# Patient Record
Sex: FEMALE | Race: WHITE | NOT HISPANIC OR LATINO | URBAN - METROPOLITAN AREA
[De-identification: names, ages, dates, MRNs, and addresses within clinical notes are randomized per-mention and may not be internally consistent; named-entity substitution may affect disease eponyms.]

---

## 2018-04-25 PROBLEM — Z23 NEED FOR HEPATITIS B VACCINATION: Status: ACTIVE | Noted: 2018-04-25

## 2018-04-25 PROBLEM — E55.9 VITAMIN D DEFICIENCY: Status: ACTIVE | Noted: 2018-04-25

## 2018-04-26 PROBLEM — R89.4 HERPES SIMPLEX ANTIBODY POSITIVE: Status: RESOLVED | Noted: 2018-04-26 | Resolved: 2018-04-26

## 2019-08-29 PROBLEM — Z01.89 DIAGNOSTIC SKIN TEST: Status: ACTIVE | Noted: 2019-08-29

## 2019-08-29 PROBLEM — T50.905A ADVERSE DRUG REACTION, INITIAL ENCOUNTER: Status: ACTIVE | Noted: 2019-08-29

## 2019-08-29 PROBLEM — L50.2 URTICARIA DUE TO COLD: Status: ACTIVE | Noted: 2019-08-29

## 2021-04-05 PROBLEM — Z12.4 CERVICAL CANCER SCREENING: Status: RESOLVED | Noted: 2020-10-08 | Resolved: 2021-04-05

## 2022-02-02 PROBLEM — R12 HEARTBURN: Status: ACTIVE | Noted: 2019-04-18

## 2022-07-15 PROBLEM — Z12.39 BREAST CANCER SCREENING: Status: ACTIVE | Noted: 2020-10-08

## 2022-07-15 PROBLEM — Z11.3 SCREEN FOR STD (SEXUALLY TRANSMITTED DISEASE): Status: ACTIVE | Noted: 2020-10-08

## 2022-07-22 PROBLEM — A59.9 TRICHOMONAS INFECTION: Status: ACTIVE | Noted: 2022-07-22

## 2022-09-09 PROBLEM — L29.2 VULVAR ITCHING: Status: ACTIVE | Noted: 2022-09-09

## 2022-10-04 PROBLEM — A59.01 TRICHOMONIASIS OF VAGINA: Status: ACTIVE | Noted: 2022-10-04

## 2022-10-12 PROBLEM — K80.20 CHOLELITHIASIS WITHOUT CHOLECYSTITIS: Status: ACTIVE | Noted: 2022-10-12

## 2022-11-03 PROBLEM — Z87.19 PERSONAL HISTORY OF OTHER DISEASES OF THE DIGESTIVE SYSTEM: Chronic | Status: ACTIVE | Noted: 2022-11-01

## 2022-11-10 PROBLEM — R73.03 PREDIABETES: Status: ACTIVE | Noted: 2021-04-19

## 2023-01-31 PROBLEM — N93.8 DYSFUNCTIONAL UTERINE BLEEDING: Status: ACTIVE | Noted: 2023-01-31

## 2023-02-06 PROBLEM — L53.9 ERYTHEMA: Status: ACTIVE | Noted: 2023-02-06

## 2023-03-10 PROBLEM — D50.9 MICROCYTIC ANEMIA: Status: ACTIVE | Noted: 2019-04-18

## 2023-04-05 PROBLEM — N76.1 SUBACUTE VAGINITIS: Status: ACTIVE | Noted: 2023-04-05

## 2023-11-08 PROBLEM — N20.1 OBSTRUCTION OF LEFT URETEROPELVIC JUNCTION DUE TO STONE: Status: ACTIVE | Noted: 2023-11-08

## 2023-12-31 PROBLEM — Z20.2 POSSIBLE EXPOSURE TO STD: Status: ACTIVE | Noted: 2018-03-28

## 2024-01-19 PROBLEM — N76.0 ACUTE VAGINITIS: Status: ACTIVE | Noted: 2024-01-19 | Resolved: 2024-02-18

## 2024-02-12 PROBLEM — E44.1 MILD PROTEIN-CALORIE MALNUTRITION: Status: ACTIVE | Noted: 2023-01-30

## 2024-06-20 PROBLEM — E65 ABDOMINAL PANNUS: Status: ACTIVE | Noted: 2024-06-20

## 2024-06-23 PROBLEM — R63.4 WEIGHT LOSS: Status: ACTIVE | Noted: 2024-06-23

## 2024-06-23 PROBLEM — M79.3 PANNICULITIS: Status: ACTIVE | Noted: 2024-06-23

## 2024-06-24 ENCOUNTER — OUTPATIENT (OUTPATIENT)
Dept: OUTPATIENT SERVICES | Facility: HOSPITAL | Age: 49
LOS: 1 days | End: 2024-06-24
Payer: MEDICARE

## 2024-06-24 DIAGNOSIS — Z98.890 OTHER SPECIFIED POSTPROCEDURAL STATES: Chronic | ICD-10-CM

## 2024-06-24 DIAGNOSIS — E65 LOCALIZED ADIPOSITY: ICD-10-CM

## 2024-06-24 PROBLEM — R63.5 WEIGHT GAIN: Status: ACTIVE | Noted: 2024-06-24

## 2024-06-24 PROCEDURE — 74176 CT ABD & PELVIS W/O CONTRAST: CPT

## 2024-07-10 PROBLEM — K42.9 UMBILICAL HERNIA WITHOUT OBSTRUCTION AND WITHOUT GANGRENE: Status: ACTIVE | Noted: 2024-07-10

## 2024-07-10 PROBLEM — K43.9 EPIGASTRIC HERNIA: Status: ACTIVE | Noted: 2024-07-10

## 2024-09-13 ENCOUNTER — EMERGENCY (EMERGENCY)
Facility: HOSPITAL | Age: 49
LOS: 1 days | Discharge: LEFT BEFORE DISCHARGE | End: 2024-09-13
Admitting: STUDENT IN AN ORGANIZED HEALTH CARE EDUCATION/TRAINING PROGRAM
Payer: MEDICARE

## 2024-09-13 VITALS
RESPIRATION RATE: 18 BRPM | SYSTOLIC BLOOD PRESSURE: 139 MMHG | TEMPERATURE: 98 F | OXYGEN SATURATION: 98 % | DIASTOLIC BLOOD PRESSURE: 79 MMHG | HEART RATE: 79 BPM | HEIGHT: 65 IN | WEIGHT: 293 LBS

## 2024-09-13 VITALS
RESPIRATION RATE: 18 BRPM | HEART RATE: 75 BPM | SYSTOLIC BLOOD PRESSURE: 146 MMHG | TEMPERATURE: 98 F | DIASTOLIC BLOOD PRESSURE: 93 MMHG | OXYGEN SATURATION: 97 %

## 2024-09-13 DIAGNOSIS — Z98.890 OTHER SPECIFIED POSTPROCEDURAL STATES: Chronic | ICD-10-CM

## 2024-09-13 LAB — HCG UR QL: NEGATIVE — SIGNIFICANT CHANGE UP

## 2024-09-13 PROCEDURE — 73562 X-RAY EXAM OF KNEE 3: CPT | Mod: 26,LT

## 2024-09-13 PROCEDURE — 99284 EMERGENCY DEPT VISIT MOD MDM: CPT

## 2024-09-13 RX ORDER — ACETAMINOPHEN 325 MG/1
650 TABLET ORAL ONCE
Refills: 0 | Status: COMPLETED | OUTPATIENT
Start: 2024-09-13 | End: 2024-09-13

## 2024-09-13 RX ADMIN — ACETAMINOPHEN 650 MILLIGRAM(S): 325 TABLET ORAL at 10:14

## 2024-09-13 NOTE — ED PROVIDER NOTE - ENMT, MLM
Airway patent, Nasal mucosa clear. Mouth with normal mucosa. Throat has no vesicles, no oropharyngeal exudates and uvula is midline. see skin pe

## 2024-09-13 NOTE — ED PROVIDER NOTE - CLINICAL SUMMARY MEDICAL DECISION MAKING FREE TEXT BOX
49 y/o female w/ a pmh of htn, h.pylori, carlo, fatty liver and iron deficiency anemia reports today c/o head trauma and L-knee pain X  approximately1 hr ago. She states she was walking- accidentally tripped on uneven sidewalk and hit her head. Tetanus UTD. 49 y/o female w/ a pmh of htn, h.pylori, carlo, fatty liver and iron deficiency anemia reports today c/o head trauma and L-knee pain X  approximately1 hr ago. She states she was walking- accidentally tripped on uneven sidewalk and hit her head. Tetanus UTD.  pt stable, in nad and non-toxic appearing  plan for pt- ct head , L-knee xr, apap  pt resting comfortably- will reassess.

## 2024-09-13 NOTE — ED PROVIDER NOTE - PROGRESS NOTE DETAILS
L-knee xr- no fx  pt did not want to wait for ct head- states she has to go   Risk of leaving w/o getting the CT discussed w/ pt= pt understood  She was recommended to f/u w/ her pcp - apap recommended  She was able to ambulate on her own out of the ED w/o assistance. L-knee xr- no fx  pt did not want to wait for ct head- states she has to go   Risk of leaving w/o getting the CT discussed w/ pt which included but was not limited to death= pt understood  She was recommended to f/u w/ her pcp - apap recommended  She was able to ambulate on her own out of the ED w/o assistance.

## 2024-09-13 NOTE — ED PROVIDER NOTE - PATIENT PORTAL LINK FT
You can access the FollowMyHealth Patient Portal offered by [facility] by registering at the following website: [email or web address]. By joining [facility]’s FollowMyHealth portal, you will also be able to view your health information using other applications (apps) compatible with our system.

## 2024-09-13 NOTE — ED PROVIDER NOTE - OBJECTIVE STATEMENT
49 y/o female w/ a pmh of htn, h.pylori, carlo, fatty liver and iron deficiency anemia reports today c/o head trauma and L-knee pain X  approximately1 hr ago. She states she was walking- accidentally tripped on uneven sidewalk and hit her head. Tetanus UTD. Denies; cp, sob, abdominal pain,dizziness, pre-syncopal episode, fever, chills, nausea or vomiting. 47 y/o female w/ a pmh of htn, h.pylori, carlo, fatty liver and iron deficiency anemia reports today c/o head trauma and L-knee pain X  approximately1 hr ago. She states she was walking- accidentally tripped on uneven sidewalk and hit her head. Tetanus UTD. Denies; cp,loc,  sob, abdominal pain,dizziness, pre-syncopal episode, fever, chills, nausea or vomiting.

## 2024-09-13 NOTE — ED ADULT NURSE NOTE - OBJECTIVE STATEMENT
Pt received to fast track. pt is AxO 3 and ambulatory. pt c/o left knee pain with head abrasion from head strike after tripping on an uneven side walk. pt denies LOC. -BEFAST. No active bleeding from left forehead abrasion. Pt respirations even and unlabored. Pt denies dizziness, blurred vision, n/v/d, abdominal pain, SOB, chest pain, or fever like symptoms. Awaiting MD orders. plan of care ongoing. Pt received to fast track. pt is AxO 3 and ambulatory. pt c/o left knee pain with head abrasion from head strike to the left forehead with hematoma after tripping on an uneven side walk. pt denies LOC. -BEFAST. No active bleeding from left forehead abrasion. Pt respirations even and unlabored. Pt denies dizziness, blurred vision, n/v/d, abdominal pain, SOB, chest pain, or fever like symptoms. Awaiting MD orders. plan of care ongoing.

## 2024-09-13 NOTE — ED ADULT NURSE NOTE - IN THE PAST 12 MONTHS HAVE YOU USED DRUGS OTHER THAN THOSE REQUIRED FOR MEDICAL REASON?
Finger stick 195 mg/dl, diabetic teaching done. Family verbalized understanding/teaching back demonstrated. Continuous monitoring.
Hernandez catheter changed per Dr. Ji. Hernandez catheter 16 FR placed, UA/C&S sent, 800 cc output, urine yellow.
No

## 2024-09-13 NOTE — ED PROVIDER NOTE - WR ORDER DATE AND TIME 1
Quality 137: Melanoma: Continuity Of Care - Recall System: Patient information entered into a recall system that includes: target date for the next exam specified AND a process to follow up with patients regarding missed or unscheduled appointments Quality 138: Melanoma: Coordination Of Care: A treatment plan was communicated to the physicians providing continuing care within one month of diagnosis outlining: diagnosis, tumor thickness and a plan for surgery or alternate care. Quality 110: Preventive Care And Screening: Influenza Immunization: Influenza Immunization Administered during Influenza season Detail Level: Simple 13-Sep-2024 09:58

## 2024-09-13 NOTE — ED ADULT TRIAGE NOTE - CHIEF COMPLAINT QUOTE
c/o left knee and headache after trip and fall today while walking up a star, denies LOC, denies N/V change sin vision, denies Phx, small abrasion noted to the left side of the forehead.

## 2024-09-13 NOTE — ED PROVIDER NOTE - CCCP TRG CHIEF CMPLNT
fall, knee pain/head injury Is This A New Presentation, Or A Follow-Up?: Skin Lesions What Type Of Note Output Would You Prefer (Optional)?: Bullet Format How Severe Is Your Skin Lesion?: moderate Has Your Skin Lesion Been Treated?: not been treated Additional History: Pt reports “milia” that she extracts at home. She states that she believes she has caused wrinkles and scarring due to picking at them. Would like to discuss treatment options.

## 2024-09-13 NOTE — ED PROVIDER NOTE - CARE PLAN
Principal Discharge DX:	Head trauma   1 Principal Discharge DX:	Head trauma  Secondary Diagnosis:	Left knee pain

## 2024-09-13 NOTE — ED ADULT NURSE NOTE - NSFALLUNIVINTERV_ED_ALL_ED
Bed/Stretcher in lowest position, wheels locked, appropriate side rails in place/Call bell, personal items and telephone in reach/Instruct patient to call for assistance before getting out of bed/chair/stretcher/Non-slip footwear applied when patient is off stretcher/[place] to call system/Physically safe environment - no spills, clutter or unnecessary equipment/Purposeful proactive rounding/Room/bathroom lighting operational, light cord in reach

## 2024-09-13 NOTE — ED ADULT TRIAGE NOTE - TEMP AT ED ARRIVAL (C)
36.5 Bcc  Nodulocystic Histology Text: The dermis contains distinctive tumor cell masses of various shapes and sizes composed of cells with large oval or elongated nuclei with relatively little cytoplasm.  The nuclei are homogenous.  There is no pronounced variation in size or intensity of staining and no significant anaplastic appearance.  The cells at the outer aspect of the tumor masses show palisading of nuclei.  Tumor masses are surrounded by a connective tissue stroma and in some areas there is retraction artifact of the tumor nodule away from the surrounding stroma.  A nodulocystic histology is noted.

## 2024-10-08 ENCOUNTER — APPOINTMENT (OUTPATIENT)
Dept: GASTROENTEROLOGY | Facility: HOSPITAL | Age: 49
End: 2024-10-08

## 2024-10-17 ENCOUNTER — OUTPATIENT (OUTPATIENT)
Dept: OUTPATIENT SERVICES | Facility: HOSPITAL | Age: 49
LOS: 1 days | End: 2024-10-17
Payer: MEDICARE

## 2024-10-17 VITALS
OXYGEN SATURATION: 97 % | HEART RATE: 80 BPM | DIASTOLIC BLOOD PRESSURE: 82 MMHG | RESPIRATION RATE: 18 BRPM | TEMPERATURE: 98 F | SYSTOLIC BLOOD PRESSURE: 133 MMHG | HEIGHT: 65 IN | WEIGHT: 293 LBS

## 2024-10-17 DIAGNOSIS — Z98.890 OTHER SPECIFIED POSTPROCEDURAL STATES: Chronic | ICD-10-CM

## 2024-10-17 DIAGNOSIS — M79.3 PANNICULITIS, UNSPECIFIED: ICD-10-CM

## 2024-10-17 DIAGNOSIS — K43.9 VENTRAL HERNIA WITHOUT OBSTRUCTION OR GANGRENE: ICD-10-CM

## 2024-10-17 DIAGNOSIS — R63.4 ABNORMAL WEIGHT LOSS: ICD-10-CM

## 2024-10-17 DIAGNOSIS — E65 LOCALIZED ADIPOSITY: ICD-10-CM

## 2024-10-17 DIAGNOSIS — K42.9 UMBILICAL HERNIA WITHOUT OBSTRUCTION OR GANGRENE: ICD-10-CM

## 2024-10-17 DIAGNOSIS — Z90.3 ACQUIRED ABSENCE OF STOMACH [PART OF]: Chronic | ICD-10-CM

## 2024-10-17 LAB
ANION GAP SERPL CALC-SCNC: 10 MMOL/L — SIGNIFICANT CHANGE UP (ref 5–17)
BLD GP AB SCN SERPL QL: SIGNIFICANT CHANGE UP
BUN SERPL-MCNC: 10 MG/DL — SIGNIFICANT CHANGE UP (ref 7–23)
CALCIUM SERPL-MCNC: 9.4 MG/DL — SIGNIFICANT CHANGE UP (ref 8.4–10.5)
CHLORIDE SERPL-SCNC: 103 MMOL/L — SIGNIFICANT CHANGE UP (ref 96–108)
CO2 SERPL-SCNC: 28 MMOL/L — SIGNIFICANT CHANGE UP (ref 22–31)
CREAT SERPL-MCNC: 0.69 MG/DL — SIGNIFICANT CHANGE UP (ref 0.5–1.3)
EGFR: 108 ML/MIN/1.73M2 — SIGNIFICANT CHANGE UP
GLUCOSE SERPL-MCNC: 122 MG/DL — HIGH (ref 70–99)
HCT VFR BLD CALC: 38.8 % — SIGNIFICANT CHANGE UP (ref 34.5–45)
HGB BLD-MCNC: 12.8 G/DL — SIGNIFICANT CHANGE UP (ref 11.5–15.5)
MCHC RBC-ENTMCNC: 28.5 PG — SIGNIFICANT CHANGE UP (ref 27–34)
MCHC RBC-ENTMCNC: 33 GM/DL — SIGNIFICANT CHANGE UP (ref 32–36)
MCV RBC AUTO: 86.4 FL — SIGNIFICANT CHANGE UP (ref 80–100)
NRBC # BLD: 0 /100 WBCS — SIGNIFICANT CHANGE UP (ref 0–0)
PLATELET # BLD AUTO: 324 K/UL — SIGNIFICANT CHANGE UP (ref 150–400)
POTASSIUM SERPL-MCNC: 3.8 MMOL/L — SIGNIFICANT CHANGE UP (ref 3.5–5.3)
POTASSIUM SERPL-SCNC: 3.8 MMOL/L — SIGNIFICANT CHANGE UP (ref 3.5–5.3)
RBC # BLD: 4.49 M/UL — SIGNIFICANT CHANGE UP (ref 3.8–5.2)
RBC # FLD: 12.7 % — SIGNIFICANT CHANGE UP (ref 10.3–14.5)
SODIUM SERPL-SCNC: 141 MMOL/L — SIGNIFICANT CHANGE UP (ref 135–145)
WBC # BLD: 12.92 K/UL — HIGH (ref 3.8–10.5)
WBC # FLD AUTO: 12.92 K/UL — HIGH (ref 3.8–10.5)

## 2024-10-17 PROCEDURE — 86901 BLOOD TYPING SEROLOGIC RH(D): CPT

## 2024-10-17 PROCEDURE — 93010 ELECTROCARDIOGRAM REPORT: CPT | Mod: NC

## 2024-10-17 PROCEDURE — 85027 COMPLETE CBC AUTOMATED: CPT

## 2024-10-17 PROCEDURE — 86900 BLOOD TYPING SEROLOGIC ABO: CPT

## 2024-10-17 PROCEDURE — G0463: CPT

## 2024-10-17 PROCEDURE — 36415 COLL VENOUS BLD VENIPUNCTURE: CPT

## 2024-10-17 PROCEDURE — 80048 BASIC METABOLIC PNL TOTAL CA: CPT

## 2024-10-17 PROCEDURE — 86850 RBC ANTIBODY SCREEN: CPT

## 2024-10-17 PROCEDURE — 93005 ELECTROCARDIOGRAM TRACING: CPT

## 2024-10-17 NOTE — H&P PST ADULT - NSICDXPASTMEDICALHX_GEN_ALL_CORE_FT
PAST MEDICAL HISTORY:  Panniculitis     Severe obesity (BMI >= 40)     Umbilical hernia     Ventral hernia

## 2024-10-17 NOTE — H&P PST ADULT - NSANTHOSAYNRD_GEN_A_CORE
neck 16 inches/No. NICOLÁS screening performed.  STOP BANG Legend: 0-2 = LOW Risk; 3-4 = INTERMEDIATE Risk; 5-8 = HIGH Risk

## 2024-10-17 NOTE — H&P PST ADULT - PROBLEM SELECTOR PLAN 1
Patient provided with pre-operative instructions and verbalized understanding.  Patient will be NPO on day of surgery. Patient will stop NSAIDs, aspirin, herbal supplements or vitamins 1 week prior to surgery.  Chlorhexidine wash provided with instructions, verbalized understanding.      Pending medical clearance as per surgeon.

## 2024-10-17 NOTE — H&P PST ADULT - TOBACCO USE
Mental Health/AODAcommunity resources discussed and given to patient.   Discussed with patient after care plan and appointments completed and documented in the AVS.   KARINA's signed and obtained for outpatient  providers.        Never smoker

## 2024-10-17 NOTE — H&P PST ADULT - HISTORY OF PRESENT ILLNESS
rashes and skin removal  Patient is a 48 year old F with no pertinent medical history presents for perioperative testing for Abdominal panniculectomy with repair of ventral and umbilical hernia with Dr. Susan Mcintyre and Dr. Lauren Shikowitz-Behr scheduled for 10/28/2024. Reports having occasional skin rashes in skin fold d/t excess skin therefore having upcoming surgery. Denies  abdominal pain, N/V/D/C, change in urinary/bowel patterns, or any acute symptoms at this time. Patient otherwise feels well overall.

## 2024-10-18 ENCOUNTER — APPOINTMENT (OUTPATIENT)
Dept: SURGERY | Facility: CLINIC | Age: 49
End: 2024-10-18

## 2024-10-21 RX ORDER — CEFADROXIL 500 MG/1
500 CAPSULE ORAL TWICE DAILY
Qty: 14 | Refills: 0 | Status: ACTIVE | COMMUNITY
Start: 2024-10-21 | End: 1900-01-01

## 2024-10-21 RX ORDER — ONDANSETRON 4 MG/1
4 TABLET, ORALLY DISINTEGRATING ORAL
Qty: 21 | Refills: 0 | Status: ACTIVE | COMMUNITY
Start: 2024-10-21 | End: 1900-01-01

## 2024-10-22 RX ORDER — OXYCODONE 5 MG/1
5 TABLET ORAL
Qty: 12 | Refills: 0 | Status: ACTIVE | COMMUNITY
Start: 2024-10-22 | End: 1900-01-01

## 2024-10-25 DIAGNOSIS — Z98.84 BARIATRIC SURGERY STATUS: ICD-10-CM

## 2024-10-28 ENCOUNTER — TRANSCRIPTION ENCOUNTER (OUTPATIENT)
Age: 49
End: 2024-10-28

## 2024-10-28 ENCOUNTER — APPOINTMENT (OUTPATIENT)
Dept: SURGERY | Facility: HOSPITAL | Age: 49
End: 2024-10-28

## 2024-10-28 ENCOUNTER — APPOINTMENT (OUTPATIENT)
Dept: PLASTIC SURGERY | Facility: HOSPITAL | Age: 49
End: 2024-10-28

## 2024-10-28 ENCOUNTER — OUTPATIENT (OUTPATIENT)
Dept: OUTPATIENT SERVICES | Facility: HOSPITAL | Age: 49
LOS: 1 days | End: 2024-10-28
Payer: MEDICARE

## 2024-10-28 DIAGNOSIS — Z90.3 ACQUIRED ABSENCE OF STOMACH [PART OF]: Chronic | ICD-10-CM

## 2024-10-28 DIAGNOSIS — Z98.890 OTHER SPECIFIED POSTPROCEDURAL STATES: Chronic | ICD-10-CM

## 2024-10-28 PROCEDURE — 15830 EXC EXCESSIVE SKIN ABDOMEN: CPT

## 2024-10-28 DEVICE — ARISTA 3GR: Type: IMPLANTABLE DEVICE | Status: FUNCTIONAL

## 2024-10-28 DEVICE — CLIP APPLIER ETHICON LIGACLIP 11.5" MEDIUM: Type: IMPLANTABLE DEVICE | Status: FUNCTIONAL

## 2024-10-30 ENCOUNTER — TRANSCRIPTION ENCOUNTER (OUTPATIENT)
Age: 49
End: 2024-10-30

## 2024-10-30 ENCOUNTER — RESULT REVIEW (OUTPATIENT)
Age: 49
End: 2024-10-30

## 2024-10-31 DIAGNOSIS — K43.9 VENTRAL HERNIA WITHOUT OBSTRUCTION OR GANGRENE: ICD-10-CM

## 2024-10-31 DIAGNOSIS — Z98.84 BARIATRIC SURGERY STATUS: ICD-10-CM

## 2024-10-31 PROBLEM — E66.01 MORBID (SEVERE) OBESITY DUE TO EXCESS CALORIES: Chronic | Status: ACTIVE | Noted: 2024-10-17

## 2024-10-31 PROCEDURE — 99231 SBSQ HOSP IP/OBS SF/LOW 25: CPT

## 2024-11-02 PROCEDURE — 49594 RPR AA HRN 1ST 3-10 NCR/STRN: CPT

## 2024-11-02 PROCEDURE — 87040 BLOOD CULTURE FOR BACTERIA: CPT

## 2024-11-02 PROCEDURE — 83880 ASSAY OF NATRIURETIC PEPTIDE: CPT

## 2024-11-02 PROCEDURE — 71045 X-RAY EXAM CHEST 1 VIEW: CPT

## 2024-11-02 PROCEDURE — 93005 ELECTROCARDIOGRAM TRACING: CPT

## 2024-11-02 PROCEDURE — 87086 URINE CULTURE/COLONY COUNT: CPT

## 2024-11-02 PROCEDURE — 80053 COMPREHEN METABOLIC PANEL: CPT

## 2024-11-02 PROCEDURE — C9399: CPT

## 2024-11-02 PROCEDURE — C8929: CPT

## 2024-11-02 PROCEDURE — 15830 EXC EXCESSIVE SKIN ABDOMEN: CPT | Mod: XP

## 2024-11-02 PROCEDURE — 85379 FIBRIN DEGRADATION QUANT: CPT

## 2024-11-02 PROCEDURE — 36415 COLL VENOUS BLD VENIPUNCTURE: CPT

## 2024-11-02 PROCEDURE — 84484 ASSAY OF TROPONIN QUANT: CPT

## 2024-11-02 PROCEDURE — 97116 GAIT TRAINING THERAPY: CPT

## 2024-11-02 PROCEDURE — 97161 PT EVAL LOW COMPLEX 20 MIN: CPT

## 2024-11-02 PROCEDURE — C1889: CPT

## 2024-11-02 PROCEDURE — 93970 EXTREMITY STUDY: CPT

## 2024-11-02 PROCEDURE — 85025 COMPLETE CBC W/AUTO DIFF WBC: CPT

## 2024-11-02 PROCEDURE — 74018 RADEX ABDOMEN 1 VIEW: CPT

## 2024-11-05 ENCOUNTER — APPOINTMENT (OUTPATIENT)
Dept: PLASTIC SURGERY | Facility: CLINIC | Age: 49
End: 2024-11-05

## 2024-11-05 DIAGNOSIS — M79.3 PANNICULITIS, UNSPECIFIED: ICD-10-CM

## 2024-11-05 DIAGNOSIS — R63.4 ABNORMAL WEIGHT LOSS: ICD-10-CM

## 2024-11-05 DIAGNOSIS — Z98.84 BARIATRIC SURGERY STATUS: ICD-10-CM

## 2024-11-05 PROBLEM — K42.9 UMBILICAL HERNIA WITHOUT OBSTRUCTION OR GANGRENE: Chronic | Status: ACTIVE | Noted: 2024-10-17

## 2024-11-05 PROCEDURE — 99024 POSTOP FOLLOW-UP VISIT: CPT

## 2024-11-07 PROBLEM — Z98.890 S/P PANNICULECTOMY: Status: ACTIVE | Noted: 2024-11-07

## 2024-11-11 ENCOUNTER — APPOINTMENT (OUTPATIENT)
Dept: PLASTIC SURGERY | Facility: CLINIC | Age: 49
End: 2024-11-11
Payer: MEDICARE

## 2024-11-11 VITALS
HEART RATE: 90 BPM | OXYGEN SATURATION: 98 % | TEMPERATURE: 97.5 F | HEIGHT: 65 IN | BODY MASS INDEX: 47.98 KG/M2 | DIASTOLIC BLOOD PRESSURE: 83 MMHG | SYSTOLIC BLOOD PRESSURE: 129 MMHG | WEIGHT: 288 LBS

## 2024-11-11 DIAGNOSIS — Z98.890 OTHER SPECIFIED POSTPROCEDURAL STATES: ICD-10-CM

## 2024-11-11 PROCEDURE — 99024 POSTOP FOLLOW-UP VISIT: CPT

## 2024-11-13 ENCOUNTER — APPOINTMENT (OUTPATIENT)
Dept: PLASTIC SURGERY | Facility: CLINIC | Age: 49
End: 2024-11-13

## 2024-11-13 VITALS
HEIGHT: 65 IN | BODY MASS INDEX: 47.98 KG/M2 | RESPIRATION RATE: 16 BRPM | OXYGEN SATURATION: 98 % | SYSTOLIC BLOOD PRESSURE: 140 MMHG | DIASTOLIC BLOOD PRESSURE: 89 MMHG | HEART RATE: 85 BPM | WEIGHT: 288 LBS | TEMPERATURE: 97.8 F

## 2024-11-13 PROCEDURE — 99024 POSTOP FOLLOW-UP VISIT: CPT

## 2024-11-18 ENCOUNTER — APPOINTMENT (OUTPATIENT)
Dept: PLASTIC SURGERY | Facility: CLINIC | Age: 49
End: 2024-11-18
Payer: MEDICARE

## 2024-11-18 VITALS
BODY MASS INDEX: 47.98 KG/M2 | DIASTOLIC BLOOD PRESSURE: 86 MMHG | HEART RATE: 89 BPM | WEIGHT: 288 LBS | SYSTOLIC BLOOD PRESSURE: 141 MMHG | OXYGEN SATURATION: 98 % | TEMPERATURE: 97.2 F | HEIGHT: 65 IN

## 2024-11-18 PROCEDURE — 99024 POSTOP FOLLOW-UP VISIT: CPT

## 2024-11-22 ENCOUNTER — APPOINTMENT (OUTPATIENT)
Dept: PLASTIC SURGERY | Facility: CLINIC | Age: 49
End: 2024-11-22

## 2024-11-22 DIAGNOSIS — E65 LOCALIZED ADIPOSITY: ICD-10-CM

## 2024-11-22 DIAGNOSIS — Z98.890 OTHER SPECIFIED POSTPROCEDURAL STATES: ICD-10-CM

## 2024-11-22 DIAGNOSIS — R63.4 ABNORMAL WEIGHT LOSS: ICD-10-CM

## 2024-11-22 DIAGNOSIS — M79.3 PANNICULITIS, UNSPECIFIED: ICD-10-CM

## 2024-11-22 PROCEDURE — 99024 POSTOP FOLLOW-UP VISIT: CPT

## 2024-11-27 ENCOUNTER — APPOINTMENT (OUTPATIENT)
Age: 49
End: 2024-11-27
Payer: MEDICARE

## 2024-11-27 ENCOUNTER — NON-APPOINTMENT (OUTPATIENT)
Age: 49
End: 2024-11-27

## 2024-11-27 PROCEDURE — 92002 INTRM OPH EXAM NEW PATIENT: CPT

## 2024-11-29 RX ORDER — CEFADROXIL 500 MG/1
500 CAPSULE ORAL TWICE DAILY
Qty: 10 | Refills: 0 | Status: ACTIVE | COMMUNITY
Start: 2024-11-29 | End: 1900-01-01

## 2024-12-02 ENCOUNTER — APPOINTMENT (OUTPATIENT)
Dept: PLASTIC SURGERY | Facility: CLINIC | Age: 49
End: 2024-12-02

## 2024-12-02 VITALS
TEMPERATURE: 98.1 F | HEIGHT: 65 IN | DIASTOLIC BLOOD PRESSURE: 86 MMHG | BODY MASS INDEX: 47.98 KG/M2 | HEART RATE: 74 BPM | WEIGHT: 288 LBS | OXYGEN SATURATION: 99 % | SYSTOLIC BLOOD PRESSURE: 138 MMHG

## 2024-12-02 PROCEDURE — 99024 POSTOP FOLLOW-UP VISIT: CPT

## 2024-12-03 ENCOUNTER — APPOINTMENT (OUTPATIENT)
Dept: PLASTIC SURGERY | Facility: CLINIC | Age: 49
End: 2024-12-03
Payer: MEDICARE

## 2024-12-03 DIAGNOSIS — R63.4 ABNORMAL WEIGHT LOSS: ICD-10-CM

## 2024-12-03 DIAGNOSIS — E65 LOCALIZED ADIPOSITY: ICD-10-CM

## 2024-12-03 DIAGNOSIS — M79.3 PANNICULITIS, UNSPECIFIED: ICD-10-CM

## 2024-12-03 PROCEDURE — 99024 POSTOP FOLLOW-UP VISIT: CPT

## 2024-12-09 ENCOUNTER — NON-APPOINTMENT (OUTPATIENT)
Age: 49
End: 2024-12-09

## 2024-12-09 ENCOUNTER — APPOINTMENT (OUTPATIENT)
Dept: PLASTIC SURGERY | Facility: CLINIC | Age: 49
End: 2024-12-09
Payer: MEDICARE

## 2024-12-09 VITALS
OXYGEN SATURATION: 97 % | HEIGHT: 65 IN | SYSTOLIC BLOOD PRESSURE: 138 MMHG | RESPIRATION RATE: 16 BRPM | TEMPERATURE: 98 F | WEIGHT: 288 LBS | BODY MASS INDEX: 47.98 KG/M2 | DIASTOLIC BLOOD PRESSURE: 88 MMHG | HEART RATE: 89 BPM

## 2024-12-09 PROCEDURE — 99024 POSTOP FOLLOW-UP VISIT: CPT

## 2024-12-11 ENCOUNTER — APPOINTMENT (OUTPATIENT)
Dept: PLASTIC SURGERY | Facility: CLINIC | Age: 49
End: 2024-12-11
Payer: MEDICARE

## 2024-12-11 VITALS
SYSTOLIC BLOOD PRESSURE: 131 MMHG | RESPIRATION RATE: 16 BRPM | HEART RATE: 90 BPM | WEIGHT: 288 LBS | DIASTOLIC BLOOD PRESSURE: 87 MMHG | TEMPERATURE: 98.1 F | BODY MASS INDEX: 47.98 KG/M2 | OXYGEN SATURATION: 97 % | HEIGHT: 65 IN

## 2024-12-11 DIAGNOSIS — Z98.890 OTHER SPECIFIED POSTPROCEDURAL STATES: ICD-10-CM

## 2024-12-11 PROCEDURE — 99024 POSTOP FOLLOW-UP VISIT: CPT

## 2024-12-17 ENCOUNTER — APPOINTMENT (OUTPATIENT)
Dept: ULTRASOUND IMAGING | Facility: CLINIC | Age: 49
End: 2024-12-17
Payer: MEDICARE

## 2024-12-17 ENCOUNTER — OUTPATIENT (OUTPATIENT)
Dept: OUTPATIENT SERVICES | Facility: HOSPITAL | Age: 49
LOS: 1 days | End: 2024-12-17
Payer: MEDICARE

## 2024-12-17 DIAGNOSIS — Z98.890 OTHER SPECIFIED POSTPROCEDURAL STATES: Chronic | ICD-10-CM

## 2024-12-17 DIAGNOSIS — Z00.00 ENCOUNTER FOR GENERAL ADULT MEDICAL EXAMINATION WITHOUT ABNORMAL FINDINGS: ICD-10-CM

## 2024-12-17 DIAGNOSIS — Z90.3 ACQUIRED ABSENCE OF STOMACH [PART OF]: Chronic | ICD-10-CM

## 2024-12-17 PROCEDURE — 76705 ECHO EXAM OF ABDOMEN: CPT

## 2024-12-17 PROCEDURE — 76705 ECHO EXAM OF ABDOMEN: CPT | Mod: 26

## 2024-12-18 RX ORDER — CEFADROXIL 500 MG/1
500 CAPSULE ORAL TWICE DAILY
Qty: 14 | Refills: 0 | Status: ACTIVE | COMMUNITY
Start: 2024-12-18 | End: 1900-01-01

## 2024-12-19 ENCOUNTER — NON-APPOINTMENT (OUTPATIENT)
Age: 49
End: 2024-12-19

## 2024-12-19 ENCOUNTER — APPOINTMENT (OUTPATIENT)
Dept: INTERVENTIONAL RADIOLOGY/VASCULAR | Facility: CLINIC | Age: 49
End: 2024-12-19

## 2024-12-19 DIAGNOSIS — S30.1XXA CONTUSION OF ABDOMINAL WALL, INITIAL ENCOUNTER: ICD-10-CM

## 2024-12-19 DIAGNOSIS — Z63.5 DISRUPTION OF FAMILY BY SEPARATION AND DIVORCE: ICD-10-CM

## 2024-12-19 DIAGNOSIS — Z78.9 OTHER SPECIFIED HEALTH STATUS: ICD-10-CM

## 2024-12-19 PROCEDURE — 99203 OFFICE O/P NEW LOW 30 MIN: CPT

## 2024-12-19 RX ORDER — ERGOCALCIFEROL 1.25 MG/1
1.25 MG CAPSULE, LIQUID FILLED ORAL
Refills: 0 | Status: ACTIVE | COMMUNITY
Start: 2024-12-19

## 2024-12-19 SDOH — SOCIAL STABILITY - SOCIAL INSECURITY: DISRUPTION OF FAMILY BY SEPARATION AND DIVORCE: Z63.5

## 2024-12-20 ENCOUNTER — OUTPATIENT (OUTPATIENT)
Dept: OUTPATIENT SERVICES | Facility: HOSPITAL | Age: 49
LOS: 1 days | End: 2024-12-20
Payer: MEDICARE

## 2024-12-20 ENCOUNTER — TRANSCRIPTION ENCOUNTER (OUTPATIENT)
Age: 49
End: 2024-12-20

## 2024-12-20 VITALS
SYSTOLIC BLOOD PRESSURE: 123 MMHG | OXYGEN SATURATION: 96 % | TEMPERATURE: 98 F | WEIGHT: 287.92 LBS | HEIGHT: 65 IN | DIASTOLIC BLOOD PRESSURE: 72 MMHG | HEART RATE: 81 BPM | RESPIRATION RATE: 16 BRPM

## 2024-12-20 DIAGNOSIS — S30.1XXA CONTUSION OF ABDOMINAL WALL, INITIAL ENCOUNTER: ICD-10-CM

## 2024-12-20 DIAGNOSIS — Z90.3 ACQUIRED ABSENCE OF STOMACH [PART OF]: Chronic | ICD-10-CM

## 2024-12-20 DIAGNOSIS — Z98.890 OTHER SPECIFIED POSTPROCEDURAL STATES: Chronic | ICD-10-CM

## 2024-12-20 LAB
GRAM STN FLD: SIGNIFICANT CHANGE UP
SPECIMEN SOURCE: SIGNIFICANT CHANGE UP

## 2024-12-20 PROCEDURE — 87075 CULTR BACTERIA EXCEPT BLOOD: CPT

## 2024-12-20 PROCEDURE — 87205 SMEAR GRAM STAIN: CPT

## 2024-12-20 PROCEDURE — 10030 IMG GID FLU COLL DRG SFT TIS: CPT

## 2024-12-20 PROCEDURE — 87070 CULTURE OTHR SPECIMN AEROBIC: CPT

## 2024-12-20 PROCEDURE — 10160 PNXR ASPIR ABSC HMTMA BULLA: CPT

## 2024-12-20 PROCEDURE — 76942 ECHO GUIDE FOR BIOPSY: CPT

## 2024-12-20 PROCEDURE — C1729: CPT

## 2024-12-20 PROCEDURE — 87015 SPECIMEN INFECT AGNT CONCNTJ: CPT

## 2024-12-20 NOTE — ASU DISCHARGE PLAN (ADULT/PEDIATRIC) - FINANCIAL ASSISTANCE
[facility] provides services at a reduced cost to those who are determined to be eligible through [facility]’s financial assistance program. Information regarding [facility]’s financial assistance program can be found by going to [email or web address] or by calling [phone].

## 2024-12-20 NOTE — PROCEDURE NOTE - PROCEDURE FINDINGS AND DETAILS
12 f ian placed into abdominal seroma  600 cc serosang fluid  patient prefers to f/u at [facility] (closer to house)  office will reach out, if not heard  call [phone] or call IR [facility]

## 2024-12-20 NOTE — ASU DISCHARGE PLAN (ADULT/PEDIATRIC) - ASU DC SPECIAL INSTRUCTIONSFT
f/u with [facility]  [phone] (dr Michael Elizabeth)  f/u with dr mc  keep track of daily drainage      Abdominal Wall Collection Drainage    Discharge Instructions  - You have had a drain placed.  - Keep the area clean and dry.  - Do not soak in a tub or pool with the drain, however you may shower with the drain and dressing covered in plastic wrap.  - Do not put traction on the drain and be careful that the drain does not get accidentally dislodged or kinked.  - Record output daily from the drain. Empty the bag as needed.  - You may resume your normal diet.  - You may resume your normal medications.  - It is normal to experience some pain over the site for the next few days. You may take apply ice to the area (20 minutes on, 20 minutes off) and take Tylenol for that pain. Do not take more frequently than every 6 hours and do not exceed more than 3000mg of Tylenol in a 24 hour period.    Notify your primary physician and/or Interventional Radiology IMMEDIATELY if you experience any of the following       - Fever of 101F  or 38C       - Chills or Rigors/ Shakes       - Swelling and/or Redness in the area of the puncture site       - Worsening Pain       - Blood soaked bandages or worsening bleeding       - Lightheadedness and/or dizziness upon standing       - Chest Pain/ Tightness       - Shortness of Breath       - Difficulty walking    If you have a problem that you believe requires IMMEDIATE attention, please go to your NEAREST Emergency Room. If you believe your problem can safely wait until you speak to a physician, please call Interventional Radiology for any concerns.    During Normal Weekday Business Hours- You can contact the Interventional Radiology department during normal business hours via telephone. [phone]  During Evenings and Weekends- If you need to contact Interventional Radiology during off hours, do so by calling the hospital and requesting to be connected to the Interventional Radiologist on call. [phone]

## 2024-12-23 ENCOUNTER — APPOINTMENT (OUTPATIENT)
Dept: PLASTIC SURGERY | Facility: CLINIC | Age: 49
End: 2024-12-23

## 2024-12-24 ENCOUNTER — APPOINTMENT (OUTPATIENT)
Dept: PLASTIC SURGERY | Facility: CLINIC | Age: 49
End: 2024-12-24
Payer: MEDICARE

## 2024-12-24 PROBLEM — S30.1XXD ABDOMINAL WALL SEROMA, SUBSEQUENT ENCOUNTER: Status: ACTIVE | Noted: 2024-12-24

## 2024-12-24 PROCEDURE — 99024 POSTOP FOLLOW-UP VISIT: CPT

## 2024-12-25 LAB
CULTURE RESULTS: SIGNIFICANT CHANGE UP
SPECIMEN SOURCE: SIGNIFICANT CHANGE UP

## 2024-12-27 ENCOUNTER — APPOINTMENT (OUTPATIENT)
Dept: CT IMAGING | Facility: HOSPITAL | Age: 49
End: 2024-12-27

## 2024-12-31 ENCOUNTER — RESULT REVIEW (OUTPATIENT)
Age: 49
End: 2024-12-31

## 2024-12-31 ENCOUNTER — TRANSCRIPTION ENCOUNTER (OUTPATIENT)
Age: 49
End: 2024-12-31

## 2024-12-31 ENCOUNTER — APPOINTMENT (OUTPATIENT)
Dept: CT IMAGING | Facility: HOSPITAL | Age: 49
End: 2024-12-31

## 2024-12-31 ENCOUNTER — OUTPATIENT (OUTPATIENT)
Dept: OUTPATIENT SERVICES | Facility: HOSPITAL | Age: 49
LOS: 1 days | End: 2024-12-31
Payer: MEDICARE

## 2024-12-31 VITALS
HEIGHT: 65 IN | DIASTOLIC BLOOD PRESSURE: 65 MMHG | TEMPERATURE: 98 F | HEART RATE: 79 BPM | SYSTOLIC BLOOD PRESSURE: 114 MMHG | WEIGHT: 287.92 LBS | OXYGEN SATURATION: 98 % | RESPIRATION RATE: 18 BRPM

## 2024-12-31 DIAGNOSIS — Z98.890 OTHER SPECIFIED POSTPROCEDURAL STATES: Chronic | ICD-10-CM

## 2024-12-31 DIAGNOSIS — S30.1XXD CONTUSION OF ABDOMINAL WALL, SUBSEQUENT ENCOUNTER: ICD-10-CM

## 2024-12-31 DIAGNOSIS — Z90.3 ACQUIRED ABSENCE OF STOMACH [PART OF]: Chronic | ICD-10-CM

## 2024-12-31 DIAGNOSIS — S30.1XXA CONTUSION OF ABDOMINAL WALL, INITIAL ENCOUNTER: ICD-10-CM

## 2024-12-31 PROCEDURE — 49424 ASSESS CYST CONTRAST INJECT: CPT

## 2024-12-31 PROCEDURE — 74176 CT ABD & PELVIS W/O CONTRAST: CPT | Mod: 26

## 2024-12-31 PROCEDURE — 84478 ASSAY OF TRIGLYCERIDES: CPT

## 2024-12-31 PROCEDURE — 76080 X-RAY EXAM OF FISTULA: CPT | Mod: 26

## 2024-12-31 PROCEDURE — 74176 CT ABD & PELVIS W/O CONTRAST: CPT

## 2024-12-31 PROCEDURE — 76080 X-RAY EXAM OF FISTULA: CPT

## 2024-12-31 NOTE — ASU DISCHARGE PLAN (ADULT/PEDIATRIC) - FINANCIAL ASSISTANCE
[facility] provides services at a reduced cost to those who are determined to be eligible through [facility]’s financial assistance program. Information regarding [facility]’s financial assistance program can be found by going to [email or web address] or by calling [phone].

## 2024-12-31 NOTE — ASU PATIENT PROFILE, ADULT - NSICDXPASTMEDICALHX_GEN_ALL_CORE_FT
PAST MEDICAL HISTORY:  COVID-19 virus infection 12/2021 fever, hedaches and runny nose    Fatty liver     H/O cholelithiasis     H/O Helicobacter infection 2018 and treated    H/O trichomoniasis treated 10/2022    HTN (hypertension)     metorrhagia/Bleeding s/p D&C    Morbid Obesity BMI 64.2    NICOLÁS (obstructive sleep apnea) moderate - no CPAP    Panniculitis     personal h/o Fatty Liver documented on testing     personal h/o Iron Deficiency Anemia     Severe obesity (BMI >= 40)     Umbilical hernia     Ventral hernia     Vertigo last episode 2019

## 2024-12-31 NOTE — PROCEDURE NOTE - PROCEDURE FINDINGS AND DETAILS
Drain study demonstrates large amorphic cavity without communication to any nearby structures. Fluid sent off to r/o lymphocele. Clean, dry and sterile dressing applied.

## 2024-12-31 NOTE — ASU DISCHARGE PLAN (ADULT/PEDIATRIC) - ASU DC SPECIAL INSTRUCTIONSFT
Drain evaluation    Discharge Instructions  - You have had a drain evaluated  - Keep the area clean and dry.  - Do not soak in a tub or pool with the drain, however you may shower with the drain and dressing covered in plastic wrap.  - Do not put traction on the drain and be careful that the drain does not get accidentally dislodged or kinked.  - Record output daily from the drain. Empty the bag as needed.  - You may resume your normal diet.  - You may resume your normal medications.  - It is normal to experience some pain over the site for the next few days. You may take apply ice to the area (20 minutes on, 20 minutes off) and take Tylenol for that pain. Do not take more frequently than every 6 hours and do not exceed more than 3000mg of Tylenol in a 24 hour period.    Notify your primary physician and/or Interventional Radiology IMMEDIATELY if you experience any of the following       - Fever of 100.4F  or 38C       - Chills or Rigors/ Shakes       - Swelling and/or Redness in the area of the puncture site       - Worsening Pain       - Blood soaked bandages or worsening bleeding       - Lightheadedness and/or dizziness upon standing       - Chest Pain/ Tightness       - Shortness of Breath       - Difficulty walking    If you have a problem that you believe requires IMMEDIATE attention, please go to your NEAREST Emergency Room. If you believe your problem can safely wait until you speak to a physician, please call Interventional Radiology for any concerns.    Please feel free to contact us at [phone] if any problems arise. After 6PM, Monday through Friday, on weekends and on holidays, please call [phone] and ask for the radiology resident on call to be paged.

## 2024-12-31 NOTE — PROCEDURE NOTE - PLAN
Case discussed with plastic surgeon Dr. Haas. Pt will start lymphedema therapy as an outpatient and will return in 2 weeks for repeat drain study. If persistently high output not amenable to drain upsizing/repositioning, can consider sclerosing at future date.

## 2024-12-31 NOTE — ASU PATIENT PROFILE, ADULT - FALL HARM RISK - UNIVERSAL INTERVENTIONS
Bed in lowest position, wheels locked, appropriate side rails in place/Call bell, personal items and telephone in reach/Instruct patient to call for assistance before getting out of bed or chair/Non-slip footwear when patient is out of bed/[place] to call system/Physically safe environment - no spills, clutter or unnecessary equipment/Purposeful Proactive Rounding/Room/bathroom lighting operational, light cord in reach

## 2024-12-31 NOTE — ASU DISCHARGE PLAN (ADULT/PEDIATRIC) - SIGNS AND SYMPTOMS OF INFECTION: FEVER, REDNESS, SWELLING, FOUL SMELLING DISCHARGE
Patient here via medics with report of taking aprox 8 tabs of tylenol ES at [id number] this am. Patient is c /o nausea. Statement Selected

## 2024-12-31 NOTE — ASU PATIENT PROFILE, ADULT - NSICDXPASTSURGICALHX_GEN_ALL_CORE_FT
PAST SURGICAL HISTORY:  H/O gastric sleeve     History of D&C 2011    History of dilation and curettage     S/P endoscopy 10/4/22 - to assess esophageal varices - no treatment at this time - stable

## 2024-12-31 NOTE — H&P ADULT - HISTORY OF PRESENT ILLNESS
Interventional Radiology    HPI: 49y Female status post ventral and umbilical hernia repair with panniculectomy on 10/28/24 complicated by abdominal wall seroma status post drainage on 12/20/24 with Dr. Candelario Caballero. Pt presented to IR on 12/26/24 with lower abdominal fullness however did not appear to be associated with drain.     She is here now for drain evaluation and with continued concern for lower abdominal fullness. CT of the abdomen demonstrates persistent collection which is associated with patient persistent daily output of 80 cc.    Review of Systems:   Constitutional: No fever, weight loss, or fatigue  Neurological: No headaches, memory loss, loss of strength, numbness, or tremors  Respiratory: No cough, wheezing, chills or hemoptysis; No shortness of breath  Cardiovascular: No chest pain, palpitations, dizziness, or leg swelling  Gastrointestinal: No abdominal or epigastric pain. No nausea, vomiting, or hematemesis; No diarrhea or constipation. No melena or hematochezia.  Skin: No itching, burning, rashes, or lesions   Musculoskeletal: No joint pain or swelling; No muscle, back, or extremity pain    Allergies: [This allergen will not trigger allergy alert] COLD urticaria - has epi pen (Rash; Urticaria)  adhesives (Rash)  No Known Drug Allergies    Medications (Abx/Cardiac/Anticoagulation/Blood Products)  acetaminophen 325 mg oral tablet: 2 tab(s) orally every 6 hours As needed Mild Pain (1 - 3) (31 Dec 2024 14:49)    Data:  165.1  130.6  T(C): 36.6  HR: 79  BP: 114/65  RR: 18  SpO2: 98%    Physical Exam  General: No acute distress, nontoxic, A&Ox3  Chest: Non labored breathing  Abdomen: Drain present. Clean, dry and sterile dressing present.   Skin: No rashes or lesions    RADIOLOGY & ADDITIONAL TESTS:    Imaging Reviewed    H & P Note Reviewed from: Chart    Plan: 49y Female presents for drain evaluation     -Risks/Benefits/alternatives explained with the patient and/or healthcare proxy and witnessed informed consent obtained.

## 2024-12-31 NOTE — ASU PREOP CHECKLIST - WAS PATIENT ON BETA BLOCKER?
Pt states he had a fever over 100.4 at home, vomiting, high blood pressure, and jaw pain.  PT states currently being treated for Hodgkin Lymphoma, last treatment Wednesday.
No

## 2025-01-01 LAB — TRIGL FLD-MCNC: 59 MG/DL — SIGNIFICANT CHANGE UP

## 2025-01-03 DIAGNOSIS — I89.0 LYMPHEDEMA, NOT ELSEWHERE CLASSIFIED: ICD-10-CM

## 2025-01-08 DIAGNOSIS — Z46.82 ENCOUNTER FOR FITTING AND ADJUSTMENT OF NON-VASCULAR CATHETER: ICD-10-CM

## 2025-01-08 DIAGNOSIS — K42.9 UMBILICAL HERNIA WITHOUT OBSTRUCTION OR GANGRENE: ICD-10-CM

## 2025-01-08 DIAGNOSIS — L76.34 POSTPROCEDURAL SEROMA OF SKIN AND SUBCUTANEOUS TISSUE FOLLOWING OTHER PROCEDURE: ICD-10-CM

## 2025-01-08 DIAGNOSIS — K43.9 VENTRAL HERNIA WITHOUT OBSTRUCTION OR GANGRENE: ICD-10-CM

## 2025-01-14 ENCOUNTER — APPOINTMENT (OUTPATIENT)
Dept: PLASTIC SURGERY | Facility: CLINIC | Age: 50
End: 2025-01-14

## 2025-01-14 DIAGNOSIS — R63.4 ABNORMAL WEIGHT LOSS: ICD-10-CM

## 2025-01-14 DIAGNOSIS — Z98.890 OTHER SPECIFIED POSTPROCEDURAL STATES: ICD-10-CM

## 2025-01-14 DIAGNOSIS — S30.1XXD CONTUSION OF ABDOMINAL WALL, SUBSEQUENT ENCOUNTER: ICD-10-CM

## 2025-01-14 DIAGNOSIS — M79.3 PANNICULITIS, UNSPECIFIED: ICD-10-CM

## 2025-01-14 DIAGNOSIS — E65 LOCALIZED ADIPOSITY: ICD-10-CM

## 2025-01-14 PROCEDURE — 99024 POSTOP FOLLOW-UP VISIT: CPT

## 2025-01-15 ENCOUNTER — TRANSCRIPTION ENCOUNTER (OUTPATIENT)
Age: 50
End: 2025-01-15

## 2025-01-15 ENCOUNTER — OUTPATIENT (OUTPATIENT)
Dept: OUTPATIENT SERVICES | Facility: HOSPITAL | Age: 50
LOS: 1 days | End: 2025-01-15
Payer: MEDICARE

## 2025-01-15 VITALS
HEART RATE: 90 BPM | SYSTOLIC BLOOD PRESSURE: 155 MMHG | TEMPERATURE: 98 F | DIASTOLIC BLOOD PRESSURE: 86 MMHG | RESPIRATION RATE: 18 BRPM | OXYGEN SATURATION: 97 %

## 2025-01-15 DIAGNOSIS — S30.1XXD CONTUSION OF ABDOMINAL WALL, SUBSEQUENT ENCOUNTER: ICD-10-CM

## 2025-01-15 DIAGNOSIS — Z90.3 ACQUIRED ABSENCE OF STOMACH [PART OF]: Chronic | ICD-10-CM

## 2025-01-15 DIAGNOSIS — Z98.890 OTHER SPECIFIED POSTPROCEDURAL STATES: Chronic | ICD-10-CM

## 2025-01-15 DIAGNOSIS — L76.34 POSTPROCEDURAL SEROMA OF SKIN AND SUBCUTANEOUS TISSUE FOLLOWING OTHER PROCEDURE: ICD-10-CM

## 2025-01-15 DIAGNOSIS — Z46.82 ENCOUNTER FOR FITTING AND ADJUSTMENT OF NON-VASCULAR CATHETER: ICD-10-CM

## 2025-01-15 DIAGNOSIS — Z90.89 ACQUIRED ABSENCE OF OTHER ORGANS: ICD-10-CM

## 2025-01-15 PROCEDURE — 76080 X-RAY EXAM OF FISTULA: CPT | Mod: 26

## 2025-01-15 PROCEDURE — 76080 X-RAY EXAM OF FISTULA: CPT

## 2025-01-15 PROCEDURE — 49424 ASSESS CYST CONTRAST INJECT: CPT

## 2025-01-15 NOTE — ASU DISCHARGE PLAN (ADULT/PEDIATRIC) - ASU DC SPECIAL INSTRUCTIONSFT
Drain Check    Discharge Instructions  - You have had a drain checked.  - Keep the area clean and dry.  - Do not soak in a tub or pool with the drain, however you may shower with the drain and dressing covered in plastic wrap.  - Do not put traction on the drain and be careful that the drain does not get accidentally dislodged or kinked.  - Record output daily from the drain. Empty the bag as needed.  - You may resume your normal diet.  - You may resume your normal medications.  - It is normal to experience some pain over the site for the next few days. You may take apply ice to the area (20 minutes on, 20 minutes off) and take Tylenol for that pain. Do not take more frequently than every 6 hours and do not exceed more than 3000mg of Tylenol in a 24 hour period.    Notify your primary physician and/or Interventional Radiology IMMEDIATELY if you experience any of the following       - Fever of 100.4F  or 38C       - Chills or Rigors/ Shakes       - Swelling and/or Redness in the area of the puncture site       - Worsening Pain       - Blood soaked bandages or worsening bleeding       - Lightheadedness and/or dizziness upon standing       - Chest Pain/ Tightness       - Shortness of Breath       - Difficulty walking    If you have a problem that you believe requires IMMEDIATE attention, please go to your NEAREST Emergency Room. If you believe your problem can safely wait until you speak to a physician, please call Interventional Radiology for any concerns.    Please feel free to contact us at [phone] if any problems arise. After 6PM, Monday through Friday, on weekends and on holidays, please call [phone] and ask for the radiology resident on call to be paged.

## 2025-01-15 NOTE — PROCEDURE NOTE - PROCEDURE FINDINGS AND DETAILS
Drain evaluation demonstrates persistent large amorphic cavity without clear communication to any nearby structures. Given persistent output of 25-35 cc output, drain was kept in place. Clean, dry and sterile dressing applied. Full report to follow.

## 2025-01-15 NOTE — ASU DISCHARGE PLAN (ADULT/PEDIATRIC) - NURSING INSTRUCTIONS
Please feel free to contact us at [phone] if any problems arise. After 6PM, Monday through Friday, on weekends and on holidays, please call [phone] and ask for the radiology resident on call to be paged.

## 2025-01-15 NOTE — ASU DISCHARGE PLAN (ADULT/PEDIATRIC) - FINANCIAL ASSISTANCE
[facility] provides services at a reduced cost to those who are determined to be eligible through [facility]’s financial assistance program. Information regarding [facility]’s financial assistance program can be found by going to [email or web address] or by calling [phone].

## 2025-01-29 ENCOUNTER — TRANSCRIPTION ENCOUNTER (OUTPATIENT)
Age: 50
End: 2025-01-29

## 2025-01-29 ENCOUNTER — OUTPATIENT (OUTPATIENT)
Dept: OUTPATIENT SERVICES | Facility: HOSPITAL | Age: 50
LOS: 1 days | End: 2025-01-29
Payer: MEDICARE

## 2025-01-29 ENCOUNTER — RESULT REVIEW (OUTPATIENT)
Age: 50
End: 2025-01-29

## 2025-01-29 VITALS
DIASTOLIC BLOOD PRESSURE: 91 MMHG | OXYGEN SATURATION: 95 % | SYSTOLIC BLOOD PRESSURE: 165 MMHG | RESPIRATION RATE: 18 BRPM | HEART RATE: 101 BPM | TEMPERATURE: 98 F

## 2025-01-29 DIAGNOSIS — T81.89XA OTHER COMPLICATIONS OF PROCEDURES, NOT ELSEWHERE CLASSIFIED, INITIAL ENCOUNTER: ICD-10-CM

## 2025-01-29 DIAGNOSIS — Z98.890 OTHER SPECIFIED POSTPROCEDURAL STATES: Chronic | ICD-10-CM

## 2025-01-29 DIAGNOSIS — Z90.3 ACQUIRED ABSENCE OF STOMACH [PART OF]: Chronic | ICD-10-CM

## 2025-01-29 DIAGNOSIS — Z98.890 OTHER SPECIFIED POSTPROCEDURAL STATES: ICD-10-CM

## 2025-01-29 DIAGNOSIS — S30.1XXD CONTUSION OF ABDOMINAL WALL, SUBSEQUENT ENCOUNTER: ICD-10-CM

## 2025-01-29 PROCEDURE — C1729: CPT

## 2025-01-29 PROCEDURE — 75984 XRAY CONTROL CATHETER CHANGE: CPT

## 2025-01-29 PROCEDURE — C1769: CPT

## 2025-01-29 PROCEDURE — 49423 EXCHANGE DRAINAGE CATHETER: CPT

## 2025-01-29 PROCEDURE — 75984 XRAY CONTROL CATHETER CHANGE: CPT | Mod: 26

## 2025-01-29 RX ORDER — TRANSPARENT DRESSING 4"X4 3/4"
BANDAGE TOPICAL
Qty: 1 | Refills: 2 | Status: ACTIVE | OUTPATIENT
Start: 2025-01-29

## 2025-01-29 RX ORDER — ISOPROPYL ALCOHOL 70 ML/100ML
SWAB TOPICAL
Qty: 1 | Refills: 1 | Status: ACTIVE | OUTPATIENT
Start: 2025-01-29

## 2025-01-29 RX ORDER — GAUZE BANDAGE 4" X 4"
4"X4" BANDAGE TOPICAL
Qty: 1 | Refills: 2 | Status: ACTIVE | OUTPATIENT
Start: 2025-01-29

## 2025-01-29 RX ORDER — GAUZE BANDAGE 2" X 2"
2"X2" BANDAGE TOPICAL
Qty: 1 | Refills: 1 | Status: ACTIVE | OUTPATIENT
Start: 2025-01-29

## 2025-01-29 NOTE — ASU DISCHARGE PLAN (ADULT/PEDIATRIC) - NS MD DC FALL RISK RISK
For information on Fall & Injury Prevention, visit: [email or web address] OR  [email or web address] OR  https://www.cdc.gov/steadi/patient.html

## 2025-01-29 NOTE — PROCEDURE NOTE - PROCEDURE FINDINGS AND DETAILS
Contrast injection showed persistent large amorphous fluid collection with no communication to surrounding structures. Pigtail catheter noted to be laying in the inferior portion of the collection. Under fluoroscopic guidance, the drain was exchanged/upsized from 12fr to 14fr and repositioned more centrally within the cavity.  Drain switched to JOSELO bulb suction. Anchoring suture applied. Dry sterile dressing applied.    Dr. Blunt aware of above Contrast injection showed persistent large amorphous fluid collection with no communication to surrounding structures. Pigtail catheter noted to be laying in the inferior portion of the collection. Under fluoroscopic guidance, the drain was exchanged/upsized from 12fr to 14fr and repositioned more centrally within the cavity. Drain switched to JOSELO bulb suction. Anchoring suture applied. Dry sterile dressing applied.    Dr. Blunt aware of above

## 2025-01-29 NOTE — PRE PROCEDURE NOTE - PRE PROCEDURE EVALUATION
Interventional Radiology    HPI: 49-year-old female with a PMH Morbid obesity, HLD, HTN, abdominal hernia repair on 10/28/2024 s/p panniculectomy c/b abdominal seroma s/p drainage at [facility] 12/20/24 who presents to IR for repeat abdominal drain evaluation. Last drain eval on 12/31/24 pt reported 25-35cc of output and imaging showed persistent large collection therefore remained in place.     Review of Systems:   Constitutional: No fever  Respiratory: No shortness of breath  Cardiovascular: No chest pain  Gastrointestinal: No abdominal or epigastric pain. No nausea, vomiting, or hematemesis;     Allergies: [This allergen will not trigger allergy alert] COLD urticaria - has epi pen (Rash; Urticaria)  adhesives (Rash)  No Known Drug Allergies    Medications (Abx/Cardiac/Anticoagulation/Blood Products)      Data:  T(C): 36.8  HR: 101  BP: 165/91  RR: 18  SpO2: 95%    Physical Exam  General: No acute distress, nontoxic, A&Ox3  Chest: Non labored breathing  Abdomen: Non-distended, non-tender, no peritoneal signs    RADIOLOGY & ADDITIONAL TESTS:    Imaging Reviewed    Plan: 49y Female presents for abdominal drain evaluation  -Risks/Benefits/alternatives explained with the patient and/or healthcare proxy and witnessed informed consent obtained.  Interventional Radiology    HPI: 49-year-old female with a PMH Morbid obesity, HLD, HTN, abdominal hernia repair on 10/28/2024 s/p panniculectomy c/b abdominal seroma s/p drainage at [facility] 12/20/24. Last drain eval on 12/31/24 pt reported 25-35cc of output and imaging showed persistent large collection therefore remained in place. Patient now presents to IR for drain evaluation, patient reports 60-85 ml drainage daily, however denies any fevers, chills, nausea, vomiting or abdominal pain. Reports some abdominal soreness.     Review of Systems:   Constitutional: No fever  Respiratory: No shortness of breath  Cardiovascular: No chest pain  Gastrointestinal: No abdominal or epigastric pain. No nausea, vomiting, or hematemesis;     Allergies: [This allergen will not trigger allergy alert] COLD urticaria - has epi pen (Rash; Urticaria)  adhesives (Rash)  No Known Drug Allergies    Medications (Abx/Cardiac/Anticoagulation/Blood Products)  Reviewed     Data:  T(C): 36.8  HR: 101  BP: 165/91  RR: 18  SpO2: 95%    Physical Exam  General: No acute distress, nontoxic, A&Ox3  Chest: Non labored breathing  Abdomen: Non-distended, non-tender, no peritoneal signs    RADIOLOGY & ADDITIONAL TESTS:    Imaging Reviewed    Plan: 49y Female presents for abdominal drain evaluation  -Risks/Benefits/alternatives explained with the patient and/or healthcare proxy and witnessed informed consent obtained.  Interventional Radiology    HPI: 49-year-old female with a PMH Morbid obesity, HLD, HTN, abdominal hernia repair on 10/28/2024 s/p panniculectomy c/b abdominal wall seroma s/p drainage at [facility] 12/20/24. Last drain eval on 1/15/25 pt reported 25-35cc of output and imaging showed persistent large collection therefore remained in place. Patient now presents to IR for repeat abdominal wall drain evaluation, patient reports 60-85 ml drainage daily, however denies any fevers, chills, nausea, vomiting or abdominal pain. Reports some abdominal soreness. States she started lymphatic massage therapy since last drain check. Of note abd wall fluid sent off for triglycerides 12/31/24 and resulted as 59.    Review of Systems:   Constitutional: No fever  Respiratory: No shortness of breath  Cardiovascular: No chest pain  Gastrointestinal: No abdominal or epigastric pain. No nausea, vomiting, or hematemesis;     Allergies: [This allergen will not trigger allergy alert] COLD urticaria - has epi pen (Rash; Urticaria)  adhesives (Rash)  No Known Drug Allergies    Medications (Abx/Cardiac/Anticoagulation/Blood Products)  Reviewed     Data:  T(C): 36.8  HR: 101  BP: 165/91  RR: 18  SpO2: 95%    Physical Exam  General: No acute distress, nontoxic, A&Ox3  Chest: Non labored breathing  Abdomen: Non-distended, non-tender, no peritoneal signs    RADIOLOGY & ADDITIONAL TESTS:    Imaging Reviewed    Plan: 49y Female presents for abdominal wall drain evaluation  -Risks/Benefits/alternatives explained with the patient and/or healthcare proxy and witnessed informed consent obtained.  Interventional Radiology    HPI: 49-year-old female with a PMH Morbid obesity, HLD, HTN, s/p panniculectomy and ventral/umbilical hernia repair 10/28/24 c/b abdominal wall fluid collection s/p drainage at [facility] 12/20/24. Last drain eval on 1/15/25 pt reported 25-35cc of output and imaging showed persistent large collection therefore remained in place. Fluid sent off for triglycerides 12/31/24 and resulted as 59. Patient now presents to IR for repeat abdominal wall drain evaluation, patient reports 60-85 ml drainage daily, however denies any fevers, chills, nausea, vomiting or abdominal pain. Reports some abdominal soreness. States she started lymphatic massage therapy since last drain check.    Review of Systems:   Constitutional: No fever  Respiratory: No shortness of breath  Cardiovascular: No chest pain  Gastrointestinal: No abdominal or epigastric pain. No nausea, vomiting, or hematemesis;     Allergies: [This allergen will not trigger allergy alert] COLD urticaria - has epi pen (Rash; Urticaria)  adhesives (Rash)  No Known Drug Allergies    Medications (Abx/Cardiac/Anticoagulation/Blood Products)  Reviewed     Data:  T(C): 36.8  HR: 101  BP: 165/91  RR: 18  SpO2: 95%    Physical Exam  General: No acute distress, nontoxic, A&Ox3  Chest: Non labored breathing  Abdomen: Non-distended, non-tender, no peritoneal signs    RADIOLOGY & ADDITIONAL TESTS:    Imaging Reviewed    Plan: 49y Female presents for abdominal wall collection drain evaluation  -Risks/Benefits/alternatives explained with the patient and/or healthcare proxy and witnessed informed consent obtained.

## 2025-01-29 NOTE — PRE PROCEDURE NOTE - GENERAL PROCEDURE NAME
abdominal drain evaluation abdominal wall drain evaluation abdominal wall collection drain evaluation

## 2025-01-29 NOTE — ASU PATIENT PROFILE, ADULT - FALL HARM RISK - UNIVERSAL INTERVENTIONS
Bed in lowest position, wheels locked, appropriate side rails in place/Call bell, personal items and telephone in reach/Instruct patient to call for assistance before getting out of bed or chair/Non-slip footwear when patient is out of bed/[place] to call system/Physically safe environment - no spills, clutter or unnecessary equipment/Purposeful Proactive Rounding/Room/bathroom lighting operational, light cord in reach

## 2025-01-29 NOTE — PROCEDURE NOTE - PLAN
-Continue fushing w/ 5cc NS daily  -F/u in 2 weeks for repeat drain evaluation -Continue flushing w/ 5cc NS daily  -F/u in 2 weeks for repeat drain evaluation

## 2025-01-29 NOTE — ASU DISCHARGE PLAN (ADULT/PEDIATRIC) - FINANCIAL ASSISTANCE
[facility] provides services at a reduced cost to those who are determined to be eligible through [facility]’s financial assistance program. Information regarding [facility]’s financial assistance program can be found by going to [email or web address] or by calling [phone].

## 2025-01-29 NOTE — ASU DISCHARGE PLAN (ADULT/PEDIATRIC) - ASU DC SPECIAL INSTRUCTIONSFT
Abdominal Drain Check    Discharge Instructions  - You have had a drain checked.  - Keep the area clean and dry.  - Do not soak in a tub or pool with the drain, however you may shower with the drain and dressing covered in plastic wrap.  - Do not put traction on the drain and be careful that the drain does not get accidentally dislodged or kinked.  - Record output daily from the drain. Empty the bag as needed.  - You may resume your normal diet.  - You may resume your normal medications.  - It is normal to experience some pain over the site for the next few days. You may take apply ice to the area (20 minutes on, 20 minutes off) and take Tylenol for that pain. Do not take more frequently than every 6 hours and do not exceed more than 3000mg of Tylenol in a 24 hour period.    Notify your primary physician and/or Interventional Radiology IMMEDIATELY if you experience any of the following       - Fever of 100.4F  or 38C       - Chills or Rigors/ Shakes       - Swelling and/or Redness in the area of the puncture site       - Worsening Pain       - Blood soaked bandages or worsening bleeding       - Lightheadedness and/or dizziness upon standing       - Chest Pain/ Tightness       - Shortness of Breath       - Difficulty walking    If you have a problem that you believe requires IMMEDIATE attention, please go to your NEAREST Emergency Room. If you believe your problem can safely wait until you speak to a physician, please call Interventional Radiology for any concerns.    Please feel free to contact us at [phone] if any problems arise. After 6PM, Monday through Friday, on weekends and on holidays, please call [phone] and ask for the radiology resident on call to be paged.

## 2025-01-30 ENCOUNTER — APPOINTMENT (OUTPATIENT)
Dept: SURGERY | Facility: CLINIC | Age: 50
End: 2025-01-30

## 2025-01-30 VITALS
HEIGHT: 65 IN | DIASTOLIC BLOOD PRESSURE: 72 MMHG | WEIGHT: 288 LBS | TEMPERATURE: 98.2 F | OXYGEN SATURATION: 100 % | RESPIRATION RATE: 16 BRPM | HEART RATE: 90 BPM | SYSTOLIC BLOOD PRESSURE: 128 MMHG | BODY MASS INDEX: 47.98 KG/M2

## 2025-01-30 DIAGNOSIS — S30.1XXD CONTUSION OF ABDOMINAL WALL, SUBSEQUENT ENCOUNTER: ICD-10-CM

## 2025-01-30 PROCEDURE — 99215 OFFICE O/P EST HI 40 MIN: CPT

## 2025-02-04 ENCOUNTER — APPOINTMENT (OUTPATIENT)
Dept: PLASTIC SURGERY | Facility: CLINIC | Age: 50
End: 2025-02-04
Payer: MEDICARE

## 2025-02-04 VITALS
TEMPERATURE: 97.9 F | HEIGHT: 65 IN | SYSTOLIC BLOOD PRESSURE: 150 MMHG | WEIGHT: 288 LBS | BODY MASS INDEX: 47.98 KG/M2 | DIASTOLIC BLOOD PRESSURE: 94 MMHG | RESPIRATION RATE: 16 BRPM | OXYGEN SATURATION: 97 % | HEART RATE: 93 BPM

## 2025-02-04 PROCEDURE — 99212 OFFICE O/P EST SF 10 MIN: CPT

## 2025-02-07 DIAGNOSIS — Z46.59 ENCOUNTER FOR FITTING AND ADJUSTMENT OF OTHER GASTROINTESTINAL APPLIANCE AND DEVICE: ICD-10-CM

## 2025-02-07 DIAGNOSIS — R22.2 LOCALIZED SWELLING, MASS AND LUMP, TRUNK: ICD-10-CM

## 2025-02-07 DIAGNOSIS — Z46.82 ENCOUNTER FOR FITTING AND ADJUSTMENT OF NON-VASCULAR CATHETER: ICD-10-CM

## 2025-02-11 ENCOUNTER — APPOINTMENT (OUTPATIENT)
Dept: PLASTIC SURGERY | Facility: CLINIC | Age: 50
End: 2025-02-11

## 2025-02-11 VITALS
HEART RATE: 85 BPM | OXYGEN SATURATION: 95 % | HEIGHT: 65 IN | SYSTOLIC BLOOD PRESSURE: 125 MMHG | WEIGHT: 290 LBS | TEMPERATURE: 97.7 F | BODY MASS INDEX: 48.32 KG/M2 | DIASTOLIC BLOOD PRESSURE: 85 MMHG

## 2025-02-11 DIAGNOSIS — E65 LOCALIZED ADIPOSITY: ICD-10-CM

## 2025-02-11 DIAGNOSIS — M79.3 PANNICULITIS, UNSPECIFIED: ICD-10-CM

## 2025-02-11 DIAGNOSIS — Z98.890 OTHER SPECIFIED POSTPROCEDURAL STATES: ICD-10-CM

## 2025-02-11 DIAGNOSIS — R63.4 ABNORMAL WEIGHT LOSS: ICD-10-CM

## 2025-02-11 PROCEDURE — 99212 OFFICE O/P EST SF 10 MIN: CPT

## 2025-02-14 ENCOUNTER — TRANSCRIPTION ENCOUNTER (OUTPATIENT)
Age: 50
End: 2025-02-14

## 2025-02-14 ENCOUNTER — OUTPATIENT (OUTPATIENT)
Dept: OUTPATIENT SERVICES | Facility: HOSPITAL | Age: 50
LOS: 1 days | End: 2025-02-14
Payer: MEDICARE

## 2025-02-14 VITALS
TEMPERATURE: 98 F | HEIGHT: 65 IN | OXYGEN SATURATION: 97 % | HEART RATE: 97 BPM | DIASTOLIC BLOOD PRESSURE: 77 MMHG | SYSTOLIC BLOOD PRESSURE: 136 MMHG | RESPIRATION RATE: 18 BRPM | WEIGHT: 289.91 LBS

## 2025-02-14 DIAGNOSIS — Z98.890 OTHER SPECIFIED POSTPROCEDURAL STATES: Chronic | ICD-10-CM

## 2025-02-14 DIAGNOSIS — Z90.3 ACQUIRED ABSENCE OF STOMACH [PART OF]: Chronic | ICD-10-CM

## 2025-02-14 DIAGNOSIS — S30.1XXD CONTUSION OF ABDOMINAL WALL, SUBSEQUENT ENCOUNTER: ICD-10-CM

## 2025-02-14 DIAGNOSIS — S30.1XXA CONTUSION OF ABDOMINAL WALL, INITIAL ENCOUNTER: ICD-10-CM

## 2025-02-14 PROCEDURE — 76080 X-RAY EXAM OF FISTULA: CPT | Mod: 26

## 2025-02-14 PROCEDURE — 76080 X-RAY EXAM OF FISTULA: CPT

## 2025-02-14 PROCEDURE — 49424 ASSESS CYST CONTRAST INJECT: CPT

## 2025-02-14 RX ORDER — GAUZE BANDAGE 2" X 2"
2"X2" BANDAGE TOPICAL
Qty: 1 | Refills: 3 | Status: ACTIVE | OUTPATIENT
Start: 2025-02-14

## 2025-02-14 RX ORDER — TRANSPARENT DRESSING 2.37X2.75"
BANDAGE TOPICAL
Qty: 30 | Refills: 3 | Status: ACTIVE | OUTPATIENT
Start: 2025-02-14

## 2025-02-14 NOTE — ASU DISCHARGE PLAN (ADULT/PEDIATRIC) - NS MD DC FALL RISK RISK
For information on Fall & Injury Prevention, visit: [email or web address] OR  [email or web address] OR  https://www.cdc.gov/steadi/patient.html

## 2025-02-14 NOTE — ASU DISCHARGE PLAN (ADULT/PEDIATRIC) - NURSING INSTRUCTIONS
Please feel free to contact us at [phone] if any problems arise. After 6PM, Monday through Friday, on weekends and on holidays, please call [phone] and ask for the radiology resident on call to be paged.

## 2025-02-14 NOTE — ASU DISCHARGE PLAN (ADULT/PEDIATRIC) - ASU DC SPECIAL INSTRUCTIONSFT
Drain Check    Discharge Instructions  - You have had a drain checked.  - Keep the area clean and dry.  - Do not soak in a tub or pool with the drain, however you may shower with the drain and dressing covered in plastic wrap.  - Do not put traction on the drain and be careful that the drain does not get accidentally dislodged or kinked.  - Record output daily from the drain. Empty the bag as needed.  - You may resume your normal diet.  - You may resume your normal medications.  - It is normal to experience some pain over the site for the next few days. You may take apply ice to the area (20 minutes on, 20 minutes off) and take Tylenol for that pain. Do not take more frequently than every 6 hours and do not exceed more than 3000mg of Tylenol in a 24 hour period.    Notify your primary physician and/or Interventional Radiology IMMEDIATELY if you experience any of the following       - Fever of 100.4F  or 38C       - Chills or Rigors/ Shakes       - Swelling and/or Redness in the area of the puncture site       - Worsening Pain       - Blood soaked bandages or worsening bleeding       - Lightheadedness and/or dizziness upon standing       - Chest Pain/ Tightness       - Shortness of Breath       - Difficulty walking    If you have a problem that you believe requires IMMEDIATE attention, please go to your NEAREST Emergency Room. If you believe your problem can safely wait until you speak to a physician, please call Interventional Radiology for any concerns.    Please feel free to contact us at [phone] if any problems arise. After 6PM, Monday through Friday, on weekends and on holidays, please call [phone] and ask for the radiology resident on call to be paged.

## 2025-02-14 NOTE — PROCEDURE NOTE - PROCEDURE FINDINGS AND DETAILS
Contrast injection demonstrated moderate residual amorphic cavity with no communication with nearby structures, cavity appears decreased in size compared to drain study done on 1/29. Remains sutured in place, dry sterile dressing placed. Full report to follow.

## 2025-02-14 NOTE — H&P ADULT - HISTORY OF PRESENT ILLNESS
Interventional Radiology    HPI: 49-year-old female with a PMH Morbid obesity, HLD, HTN, s/p panniculectomy and ventral/umbilical hernia repair 10/28/24 c/b abdominal wall fluid collection s/p drainage at [facility] 12/20/24. Last appointment, pt underwent upsizing of drain from 12 Fr to 14 Fr. Patient now presents to IR for repeat abdominal wall drain evaluation, patient reports ...    Review of Systems:   Constitutional: No fever, weight loss, or fatigue  Neurological: No headaches, memory loss, loss of strength, numbness, or tremors  Respiratory: No cough, wheezing, chills or hemoptysis; No shortness of breath  Cardiovascular: No chest pain, palpitations, dizziness, or leg swelling  Gastrointestinal: No abdominal or epigastric pain. No nausea, vomiting, or hematemesis; No diarrhea or constipation. No melena or hematochezia.  Skin: No itching, burning, rashes, or lesions   Musculoskeletal: No joint pain or swelling; No muscle, back, or extremity pain    Allergies: [This allergen will not trigger allergy alert] COLD urticaria - has epi pen (Rash; Urticaria)  No Known Drug Allergies  adhesives (Rash)    Medications (Abx/Cardiac/Anticoagulation/Blood Products)    Data:    T(C): --  HR: --  BP: --  RR: --  SpO2: --    Physical Exam  General: No acute distress, nontoxic, A&Ox3  Chest: Non labored breathing  Abdomen: Non-distended, non-tender, no preitoneal signs    RADIOLOGY & ADDITIONAL TESTS:    Imaging Reviewed    H & P Note Reviewed from: 12/31/24    Plan: 49y Female presents for abdominal collection drain evaluation.    -Risks/Benefits/alternatives explained with the patient and/or healthcare proxy and witnessed informed consent obtained.    Interventional Radiology    HPI: 49-year-old female with a PMH Morbid obesity, HLD, HTN, s/p panniculectomy and ventral/umbilical hernia repair 10/28/24 c/b abdominal wall fluid collection s/p drainage at [facility] 12/20/24. Last appointment, pt underwent upsizing of drain from 12 Fr to 14 Fr. Patient now presents to IR for repeat abdominal wall drain evaluation, patient reports output over last 4 days as 40ml/70ml/40ml and 25ml today, states initially was yellow and over past week is pink tinged. States has intermittent pain, states "shes numb from surgery but thinks sensation might be coming back". Denies fevers/chills/nausea/vomiting.     Review of Systems:   Constitutional: No fever, weight loss, or fatigue  Neurological: No headaches, memory loss, loss of strength, numbness, or tremors  Respiratory: No cough, wheezing, chills or hemoptysis; No shortness of breath  Cardiovascular: No chest pain, palpitations, dizziness, or leg swelling  Gastrointestinal: No abdominal or epigastric pain. No nausea, vomiting, or hematemesis; No diarrhea or constipation. No melena or hematochezia.  Skin: No itching, burning, rashes, or lesions   Musculoskeletal: No joint pain or swelling; No muscle, back, or extremity pain    Allergies: [This allergen will not trigger allergy alert] COLD urticaria - has epi pen (Rash; Urticaria)  No Known Drug Allergies  adhesives (Rash)    Medications (Abx/Cardiac/Anticoagulation/Blood Products)    Data:    T(C): --  HR: --  BP: --  RR: --  SpO2: --    Physical Exam  General: No acute distress, nontoxic, A&Ox3  Chest: Non labored breathing  Abdomen: Non-distended, non-tender, no peritoneal signs. Abd drain with light pink tinged output to JOSELO drain.   RADIOLOGY & ADDITIONAL TESTS:    Imaging Reviewed    H & P Note Reviewed from: 12/31/24    Plan: 49y Female presents for abdominal collection drain evaluation.    -Risks/Benefits/alternatives explained with the patient and/or healthcare proxy and witnessed informed consent obtained.

## 2025-02-14 NOTE — PROCEDURE NOTE - PLAN
Follow up with plastic surgeon.  Follow up with IR in 2 weeks for repeat drain evaluation and discussion about potential future sclerosis if warranted.

## 2025-02-14 NOTE — ASU DISCHARGE PLAN (ADULT/PEDIATRIC) - FINANCIAL ASSISTANCE
[facility] provides services at a reduced cost to those who are determined to be eligible through [facility]’s financial assistance program. Information regarding [facility]’s financial assistance program can be found by going to [email or web address] or by calling [phone].

## 2025-02-14 NOTE — ASU DISCHARGE PLAN (ADULT/PEDIATRIC) - COMMENTS
Please follow up with your surgeon to formulate plan.  Follow up with IR in 2 weeks for drain evaluation; or timing based off surgeon recommendations. Can call the IR booking office at [phone] to schedule appointment.

## 2025-02-28 ENCOUNTER — TRANSCRIPTION ENCOUNTER (OUTPATIENT)
Age: 50
End: 2025-02-28

## 2025-02-28 ENCOUNTER — OUTPATIENT (OUTPATIENT)
Dept: OUTPATIENT SERVICES | Facility: HOSPITAL | Age: 50
LOS: 1 days | End: 2025-02-28
Payer: MEDICARE

## 2025-02-28 VITALS
DIASTOLIC BLOOD PRESSURE: 65 MMHG | HEIGHT: 65 IN | TEMPERATURE: 98 F | SYSTOLIC BLOOD PRESSURE: 159 MMHG | WEIGHT: 293 LBS | OXYGEN SATURATION: 96 % | HEART RATE: 89 BPM | RESPIRATION RATE: 18 BRPM

## 2025-02-28 DIAGNOSIS — R22.2 LOCALIZED SWELLING, MASS AND LUMP, TRUNK: ICD-10-CM

## 2025-02-28 DIAGNOSIS — Z98.890 OTHER SPECIFIED POSTPROCEDURAL STATES: Chronic | ICD-10-CM

## 2025-02-28 DIAGNOSIS — Z90.3 ACQUIRED ABSENCE OF STOMACH [PART OF]: Chronic | ICD-10-CM

## 2025-02-28 DIAGNOSIS — S30.1XXD CONTUSION OF ABDOMINAL WALL, SUBSEQUENT ENCOUNTER: ICD-10-CM

## 2025-02-28 DIAGNOSIS — Z46.82 ENCOUNTER FOR FITTING AND ADJUSTMENT OF NON-VASCULAR CATHETER: ICD-10-CM

## 2025-02-28 PROCEDURE — 49423 EXCHANGE DRAINAGE CATHETER: CPT

## 2025-02-28 PROCEDURE — 75984 XRAY CONTROL CATHETER CHANGE: CPT | Mod: 26

## 2025-02-28 PROCEDURE — C1887: CPT

## 2025-02-28 PROCEDURE — 75984 XRAY CONTROL CATHETER CHANGE: CPT

## 2025-02-28 PROCEDURE — C1769: CPT

## 2025-02-28 PROCEDURE — C1729: CPT

## 2025-02-28 NOTE — PROCEDURE NOTE - PROCEDURE FINDINGS AND DETAILS
Contrast injection through existing drain demonstrates clogged drain with contrast filling large collection superior to the drain. Drain holes clogged with fibrinous material. Drain exchanged over a wire under fluoroscopy. New 14Fr drain placed and collection decompressed. Drain sutured in place and placed to gravity bag. Full report to follow.

## 2025-02-28 NOTE — PRE PROCEDURE NOTE - PRE PROCEDURE EVALUATION
Interventional Radiology    HPI: 49-year-old female with a PMH Morbid obesity, HLD, HTN, s/p panniculectomy and ventral/umbilical hernia repair 10/28/24 c/b abdominal wall fluid collection s/p drainage at [facility] 12/20/24. Last appointment, pt underwent upsizing of drain from 12 Fr to 14 Fr. Patient now presents to IR for repeat abdominal wall drain evaluation, patient reports output over 15-20ml last 5 days. States initially was yellow and over past week is pink tinged. States has intermittent pain, states "shes numb from surgery but thinks sensation might be coming back". Denies fevers/chills/nausea/vomiting.     Allergies: adhesives (Rash)  [This allergen will not trigger allergy alert] COLD urticaria - has epi pen (Rash; Urticaria)  No Known Drug Allergies    Medications (Abx/Cardiac/Anticoagulation/Blood Products)      Data:  165.1  133.8  T(C): 36.6  HR: 89  BP: 159/65  RR: 18  SpO2: 96%    Exam  General: No acute distress  Chest: Non labored breathing  Abdomen: Non-distended  Extremities: No swelling, warm        Imaging: Reviewed    Plan: 49y Female presents for abdominal seroma drain evaluation.    -Risks/Benefits/alternatives explained with the patient and/or healthcare proxy and witnessed informed consent obtained.     --  Felix Angel NP  Interventional Radiology  Available on [employer] TE[employer] / [place]    For EMERGENT inquiries/questions:  IR Pager ([facility]): [phone]    For non-emergent consults/questions:   Please place a sunrise order "Consult- Interventional Radiology" with an appropriate callback number    For questions about scheduling during appropriate work hours, call IR :  [facility]: [phone]    For outpatient IR booking:  [facility]: [phone]

## 2025-02-28 NOTE — ASU DISCHARGE PLAN (ADULT/PEDIATRIC) - FINANCIAL ASSISTANCE
[facility] provides services at a reduced cost to those who are determined to be eligible through [facility]’s financial assistance program. Information regarding [facility]’s financial assistance program can be found by going to [email or web address] or by calling [phone].

## 2025-02-28 NOTE — ASU DISCHARGE PLAN (ADULT/PEDIATRIC) - PHYSICIAN SECTION COMPLETE
SUBJECTIVE:  Dwaine Blanco is a 51 year old male who presents for a consultation at the request of, and a copy of this note will be sent to, Bbeo Bass, for evaluation of  left testis mass. He states that the problem is unchanged. Symptoms include noted on physical exam recently to have a left-sided testicular mass.  The patient states he has no pain or discomfort.  He has felt it in the past and has been stable in size.  Scrotal ultrasound October 5, 2024 shows small bilateral hydroceles and mild left varicocele.  No previous urologic history.  No history of fertility.  Has 3 children.. He denies any other complaints.    Allergies: Allergies[1]    History:  Past Medical History:    High blood pressure    High cholesterol    Sleep apnea    Unspecified essential hypertension      Past Surgical History:   Procedure Laterality Date    Colonoscopy screening - referral N/A 5/13/2022    Procedure: COLONOSCOPY-SCREENING;  Surgeon: Julio Cesar Coronel MD;  Location: [facility] ENDOSCOPY    Electrocardiogram, complete  03/14/2013    Scanned to Media Tab      Family History   Problem Relation Age of Onset    Cancer Maternal Grandmother         colon cancer    Cancer Sister         Leukemia    Melanoma Other         Malignant  Relative?      Social History:   Social History     Socioeconomic History    Marital status:    Tobacco Use    Smoking status: Never    Smokeless tobacco: Never   Vaping Use    Vaping status: Never Used   Substance and Sexual Activity    Alcohol use: Yes     Comment: Rarely    Drug use: Never   Other Topics Concern    Caffeine Concern Yes     Comment: Coffee/Soda            REVIEW OF SYSTEMS:  RESPIRATORY:  Negative for chest tightness, wheezing, cough, shortness of breath,  sputum production,chest pain or pleurisy.  CARDIOVASCULAR:  Negative for pain or chest discomfort, dizziness or fainting, palpitations, leg swelling, nocturia, or claudication.  GASTROINTESTINAL:  Negative for nausea,  vomiting, diarrhea, constipation, heartburn or indigestion, abdominal pains, bloody or tarry stools.  GENERAL: Denies:  weight gain, weight loss, fever, night sweats, bone pain, malaise, and fatigue. Positive for:   All other review of systems reviewed and otherwise negativenone.      OBJECTIVE:  /79 (BP Location: Right arm, Patient Position: Sitting, Cuff Size: large)   Pulse 71   Ht 6' (1.829 m)   Wt 230 lb (104.3 kg)   BMI 31.19 kg/m²   He appears well, in no apparent distress.  Alert and oriented times three, pleasant and cooperative.  CARDIOVASCULAR:normal apical impulse  RESPIRATORY: no chest wall deformities or tenderness  ABDOMEN: abdomen is soft without significant tenderness, masses, organomegaly or guarding  GENITOURINARY:      Penis: no penile lesions or discharge. Meatus normal location and size.      Scrotum: normal in appearance      Right Epididymis and Vas: both are palpably normal.      Right Testis: normal        Left Epididymis and Vas: both are palpably normal. and left grade 2 varicocele      Left Testis: normal        Inguinal Lymph Nodes: non-palpable both      Skin exam grossly normal  Intact neurologically grossly  ASSESSMENT/PLAN  Encounter Diagnosis   Name Primary?    Testis mass Yes   Recommend:  - Reassured the patient about the benign nature of findings noted on ultrasound.  - We agreed that he would follow-up on a as needed basis if he has no symptoms.    No orders of the defined types were placed in this encounter.      Meds This Visit:  Requested Prescriptions      No prescriptions requested or ordered in this encounter       Imaging & Referrals:  None                        [1] No Known Allergies     Yes

## 2025-02-28 NOTE — ASU DISCHARGE PLAN (ADULT/PEDIATRIC) - ASU DC SPECIAL INSTRUCTIONSFT
Drain Check and Exchange    Discharge Instructions  - You have had a drain checked.  - Keep the area clean and dry.  - Please flush drain with 10cc normal saline daily  - Do not soak in a tub or pool with the drain, however you may shower with the drain and dressing covered in plastic wrap.  - Do not put traction on the drain and be careful that the drain does not get accidentally dislodged or kinked.  - Record output daily from the drain. Empty the bag as needed.  - You may resume your normal diet.  - You may resume your normal medications.  - It is normal to experience some pain over the site for the next few days. You may take apply ice to the area (20 minutes on, 20 minutes off) and take Tylenol for that pain. Do not take more frequently than every 6 hours and do not exceed more than 3000mg of Tylenol in a 24 hour period.    Notify your primary physician and/or Interventional Radiology IMMEDIATELY if you experience any of the following       - Fever of 100.4F  or 38C       - Chills or Rigors/ Shakes       - Swelling and/or Redness in the area of the puncture site       - Worsening Pain       - Blood soaked bandages or worsening bleeding       - Lightheadedness and/or dizziness upon standing       - Chest Pain/ Tightness       - Shortness of Breath       - Difficulty walking    If you have a problem that you believe requires IMMEDIATE attention, please go to your NEAREST Emergency Room. If you believe your problem can safely wait until you speak to a physician, please call Interventional Radiology for any concerns.    Please feel free to contact us at [phone] if any problems arise. After 6PM, Monday through Friday, on weekends and on holidays, please call [phone] and ask for the radiology resident on call to be paged.

## 2025-02-28 NOTE — ASU PREOP CHECKLIST - IV STARTED
no The resident's documentation has been prepared under my direction and personally reviewed by me in its entirety. I confirm that the note above accurately reflects all work, treatment, procedures, and medical decision making performed by me.  Tadeo Harrison MD

## 2025-02-28 NOTE — ASU DISCHARGE PLAN (ADULT/PEDIATRIC) - NS MD DC FALL RISK RISK
For information on Fall & Injury Prevention, visit: [email or web address] OR  [email or web address] OR  https://www.cdc.gov/steadi/patient.html

## 2025-02-28 NOTE — PROCEDURE NOTE - IR COMPLICATIONS
Well Child Visit at 5 to 6 Years   AMBULATORY CARE:   A well child visit  is when your child sees a healthcare provider to prevent health problems  Well child visits are used to track your child's growth and development  It is also a time for you to ask questions and to get information on how to keep your child safe  Write down your questions so you remember to ask them  Your child should have regular well child visits from birth to 16 years  Development milestones your child may reach between 5 and 6 years:  Each child develops at his or her own pace  Your child might have already reached the following milestones, or he or she may reach them later:  · Balance on one foot, hop, and skip    · Tie a knot    · Hold a pencil correctly    · Draw a person with at least 6 body parts    · Print some letters and numbers, copy squares and triangles    · Tell simple stories using full sentences, and use appropriate tenses and pronouns    · Count to 10, and name at least 4 colors    · Listen and follow simple directions    · Dress and undress with minimal help    · Say his or her address and phone number    · Print his or her first name    · Start to lose baby teeth    · Ride a bicycle with training wheels or other help    Help prepare your child for school:   · Talk to your child about going to school  Talk about meeting new friends and having new activities at school  Take time to tour the school with your child and meet the teacher  · Begin to establish routines  Have your child go to bed at the same time every night  · Read with your child  Read books to your child  Point to the words as you read so your child begins to recognize words  Ways to help your child who is already in school:   · Engage with your child if he or she watches TV  Do not let your child watch TV alone, if possible  You or another adult should watch with your child  Talk with your child about what he or she is watching   When TV time is done, try to apply what you and your child saw  For example, if your child saw someone print words, have your child print those same words  TV time should never replace active playtime  Turn the TV off when your child plays  Do not let your child watch TV during meals or within 1 hour of bedtime  · Limit your child's screen time  Screen time is the amount of television, computer, smart phone, and video game time your child has each day  It is important to limit screen time  This helps your child get enough sleep, physical activity, and social interaction each day  Your child's pediatrician can help you create a screen time plan  The daily limit is usually 1 hour for children 2 to 5 years  The daily limit is usually 2 hours for children 6 years or older  You can also set limits on the kinds of devices your child can use, and where he or she can use them  Keep the plan where your child and anyone who takes care of him or her can see it  Create a plan for each child in your family  You can also go to [employer]/[email or web address]/Pages/default  aspx#planview for more help creating a plan  · Read with your child  Read books to your child, or have him or her read to you  Also read words outside of your home, such as street signs  · Encourage your child to talk about school every day  Talk to your child about the good and bad things that happened during the school day  Encourage your child to tell you or a teacher if someone is being mean to him or her  What else you can do to support your child:   · Teach your child behaviors that are acceptable  This is the goal of discipline  Set clear limits that your child cannot ignore  Be consistent, and make sure everyone who cares for your child disciplines him or her the same way  · Help your child to be responsible  Give your child routine chores to do  Expect your child to do them  · Talk to your child about anger    Help manage anger without hitting, biting, or other violence  Show him or her positive ways you handle anger  Praise your child for self-control  · Encourage your child to have friendships  Meet your child's friends and their parents  Remember to set limits to encourage safety  Help your child stay healthy:   · Teach your child to care for his or her teeth and gums  Have your child brush his or her teeth at least 2 times every day, and floss 1 time every day  Have your child see the dentist 2 times each year  · Make sure your child has a healthy breakfast every day  Breakfast can help your child learn and behave better in school  · Teach your child how to make healthy food choices at school  A healthy lunch may include a sandwich with lean meat, cheese, or peanut butter  It could also include a fruit, vegetable, and milk  Pack healthy foods if your child takes his or her own lunch  Pack baby carrots or pretzels instead of potato chips in your child's lunch box  You can also add fruit or low-fat yogurt instead of cookies  Keep his or her lunch cold with an ice pack so that it does not spoil  · Encourage physical activity  Your child needs 60 minutes of physical activity every day  The 60 minutes of physical activity does not need to be done all at once  It can be done in shorter blocks of time  Find family activities that encourage physical activity, such as walking the dog  Help your child get the right nutrition:  Offer your child a variety of foods from all the food groups  The number and size of servings that your child needs from each food group depends on his or her age and activity level  Ask your dietitian how much your child should eat from each food group  · Half of your child's plate should contain fruits and vegetables  Offer fresh, canned, or dried fruit instead of fruit juice as often as possible  Limit juice to 4 to 6 ounces each day  Offer more dark green, red, and orange vegetables   Dark green vegetables include broccoli, spinach, aston lettuce, and godfrey greens  Examples of orange and red vegetables are carrots, sweet potatoes, winter squash, and red peppers  · Offer whole grains to your child each day  Half of the grains your child eats each day should be whole grains  Whole grains include brown rice, whole-wheat pasta, and whole-grain cereals and breads  · Make sure your child gets enough calcium  Calcium is needed to build strong bones and teeth  Children need about 2 to 3 servings of dairy each day to get enough calcium  Good sources of calcium are low-fat dairy foods (milk, cheese, and yogurt)  A serving of dairy is 8 ounces of milk or yogurt, or 1½ ounces of cheese  Other foods that contain calcium include tofu, kale, spinach, broccoli, almonds, and calcium-fortified orange juice  Ask your child's healthcare provider for more information about the serving sizes of these foods  · Offer lean meats, poultry, fish, and other protein foods  Other sources of protein include legumes (such as beans), soy foods (such as tofu), and peanut butter  Bake, broil, and grill meat instead of frying it to reduce the amount of fat  · Offer healthy fats in place of unhealthy fats  A healthy fat is unsaturated fat  It is found in foods such as soybean, canola, olive, and sunflower oils  It is also found in soft tub margarine that is made with liquid vegetable oil  Limit unhealthy fats such as saturated fat, trans fat, and cholesterol  These are found in shortening, butter, stick margarine, and animal fat  · Limit foods that contain sugar and are low in nutrition  Limit candy, soda, and fruit juice  Do not give your child fruit drinks  Limit fast food and salty snacks  · Let your child decide how much to eat  Give your child small portions  Let your child have another serving if he or she asks for one  Your child will be very hungry on some days and want to eat more   For example, your child may want to eat more on days when he or she is more active  Your child may also eat more if he or she is going through a growth spurt  There may be days when your child eats less than usual      Keep your child safe:   · Always have your child ride in a booster car seat,  and make sure everyone in your car wears a seatbelt  ? Children aged 3 to 8 years should ride in a booster car seat in the back seat  ? Booster seats come with and without a seat back  Your child will be secured in the booster seat with the regular seatbelt in your car     ? Your child must stay in the booster car seat until he or she is between 6and 15years old and 4 foot 9 inches (57 inches) tall  This is when a regular seatbelt should fit your child properly without the booster seat  ? Your child should remain in a forward-facing car seat if you only have a lap belt seatbelt in your car  Some forward-facing car seats hold children who weigh more than 40 pounds  The harness on the forward-facing car seat will keep your child safer and more secure than a lap belt and booster seat  · Teach your child how to cross the street safely  Teach your child to stop at the curb, look left, then look right, and left again  Tell your child never to cross the street without an adult  Teach your child where the school bus will pick him or her up and drop him or her off  Always have adult supervision at your child's bus stop  · Teach your child to wear safety equipment  Make sure your child has on proper safety equipment when he or she plays sports and rides his or her bicycle  Your child should wear a helmet when he or she rides his or her bicycle  The helmet should fit properly  Never let your child ride his or her bicycle in the street  · Teach your child how to swim if he or she does not know how  Even if your child knows how to swim, do not let him or her play around water alone   An adult needs to be present and watching at all times  Make sure your child wears a safety vest when he or she is on a boat  · Put sunscreen on your child before he or she goes outside to play or swim  Use sunscreen with a SPF 15 or higher  Use as directed  Apply sunscreen at least 15 minutes before your child goes outside  Reapply sunscreen every 2 hours when outside  · Talk to your child about personal safety without making him or her anxious  Explain to him or her that no one has the right to touch his or her private parts  Also explain that no one should ask your child to touch their private parts  Let your child know that he or she should tell you even if he or she is told not to  · Teach your child fire safety  Do not leave matches or lighters within reach of your child  Make a family escape plan  Practice what to do in case of a fire  · Keep guns locked safely out of your child's reach  Guns in your home can be dangerous to your family  If you must keep a gun in your home, unload it and lock it up  Keep the ammunition in a separate locked place from the gun  Keep the keys out of your child's reach  Never  keep a gun in an area where your child plays  What you need to know about your child's next well child visit:  Your child's healthcare provider will tell you when to bring him or her in again  The next well child visit is usually at 7 to 8 years  Contact your child's healthcare provider if you have questions or concerns about his or her health or care before the next visit  All children aged 3 to 5 years should have at least one vision screening  Your child may need vaccines at the next well child visit  Your provider will tell you which vaccines your child needs and when your child should get them  Follow up with your child's healthcare provider as directed:  Write down your questions so you remember to ask them during your child's visits    © Copyright [employer] 2020 Information is for End User's use only and may not be sold, redistributed or otherwise used for commercial purposes  All illustrations and images included in CareNotes® are the copyrighted property of A D A M , Inc  or Aime Huang  The above information is an  only  It is not intended as medical advice for individual conditions or treatments  Talk to your doctor, nurse or pharmacist before following any medical regimen to see if it is safe and effective for you  No complications

## 2025-03-05 RX ORDER — CEFADROXIL 500 MG/1
1 CAPSULE ORAL
Refills: 0 | DISCHARGE
Start: 2025-03-05

## 2025-03-06 ENCOUNTER — INPATIENT (INPATIENT)
Facility: HOSPITAL | Age: 50
LOS: 4 days | Discharge: HOME | DRG: 392 | End: 2025-03-11
Attending: HOSPITALIST | Admitting: STUDENT IN AN ORGANIZED HEALTH CARE EDUCATION/TRAINING PROGRAM
Payer: MEDICARE

## 2025-03-06 VITALS
OXYGEN SATURATION: 97 % | HEIGHT: 64 IN | DIASTOLIC BLOOD PRESSURE: 83 MMHG | RESPIRATION RATE: 16 BRPM | SYSTOLIC BLOOD PRESSURE: 120 MMHG | TEMPERATURE: 99 F | WEIGHT: 240.08 LBS | HEART RATE: 99 BPM

## 2025-03-06 DIAGNOSIS — Z98.890 OTHER SPECIFIED POSTPROCEDURAL STATES: Chronic | ICD-10-CM

## 2025-03-06 DIAGNOSIS — S30.1XXA CONTUSION OF ABDOMINAL WALL, INITIAL ENCOUNTER: ICD-10-CM

## 2025-03-06 DIAGNOSIS — Z90.3 ACQUIRED ABSENCE OF STOMACH [PART OF]: Chronic | ICD-10-CM

## 2025-03-06 LAB
ALBUMIN SERPL ELPH-MCNC: 4.3 G/DL — SIGNIFICANT CHANGE UP (ref 3.3–5)
ALP SERPL-CCNC: 104 U/L — SIGNIFICANT CHANGE UP (ref 40–120)
ALT FLD-CCNC: 14 U/L — SIGNIFICANT CHANGE UP (ref 10–45)
ANION GAP SERPL CALC-SCNC: 12 MMOL/L — SIGNIFICANT CHANGE UP (ref 5–17)
ANISOCYTOSIS BLD QL: SLIGHT — SIGNIFICANT CHANGE UP
APPEARANCE UR: ABNORMAL
APTT BLD: 31.6 SEC — SIGNIFICANT CHANGE UP (ref 24.5–35.6)
AST SERPL-CCNC: 13 U/L — SIGNIFICANT CHANGE UP (ref 10–40)
BACTERIA # UR AUTO: NEGATIVE /HPF — SIGNIFICANT CHANGE UP
BASOPHILS # BLD AUTO: 0.14 K/UL — SIGNIFICANT CHANGE UP (ref 0–0.2)
BASOPHILS NFR BLD AUTO: 0.8 % — SIGNIFICANT CHANGE UP (ref 0–2)
BILIRUB SERPL-MCNC: 0.4 MG/DL — SIGNIFICANT CHANGE UP (ref 0.2–1.2)
BILIRUB UR-MCNC: NEGATIVE — SIGNIFICANT CHANGE UP
BUN SERPL-MCNC: 8 MG/DL — SIGNIFICANT CHANGE UP (ref 7–23)
CALCIUM SERPL-MCNC: 9.7 MG/DL — SIGNIFICANT CHANGE UP (ref 8.4–10.5)
CAST: 0 /LPF — SIGNIFICANT CHANGE UP (ref 0–4)
CHLORIDE SERPL-SCNC: 100 MMOL/L — SIGNIFICANT CHANGE UP (ref 96–108)
CO2 SERPL-SCNC: 24 MMOL/L — SIGNIFICANT CHANGE UP (ref 22–31)
COLOR SPEC: YELLOW — SIGNIFICANT CHANGE UP
CREAT SERPL-MCNC: 0.65 MG/DL — SIGNIFICANT CHANGE UP (ref 0.5–1.3)
DIFF PNL FLD: NEGATIVE — SIGNIFICANT CHANGE UP
EGFR: 109 ML/MIN/1.73M2 — SIGNIFICANT CHANGE UP
EGFR: 109 ML/MIN/1.73M2 — SIGNIFICANT CHANGE UP
EOSINOPHIL # BLD AUTO: 0.15 K/UL — SIGNIFICANT CHANGE UP (ref 0–0.5)
EOSINOPHIL NFR BLD AUTO: 0.9 % — SIGNIFICANT CHANGE UP (ref 0–6)
GLUCOSE SERPL-MCNC: 90 MG/DL — SIGNIFICANT CHANGE UP (ref 70–99)
GLUCOSE UR QL: NEGATIVE MG/DL — SIGNIFICANT CHANGE UP
HCT VFR BLD CALC: 43.5 % — SIGNIFICANT CHANGE UP (ref 34.5–45)
HGB BLD-MCNC: 13.2 G/DL — SIGNIFICANT CHANGE UP (ref 11.5–15.5)
INR BLD: 1.11 RATIO — SIGNIFICANT CHANGE UP (ref 0.85–1.16)
KETONES UR-MCNC: NEGATIVE MG/DL — SIGNIFICANT CHANGE UP
LEUKOCYTE ESTERASE UR-ACNC: ABNORMAL
LIDOCAIN IGE QN: 21 U/L — SIGNIFICANT CHANGE UP (ref 7–60)
LYMPHOCYTES # BLD AUTO: 14.4 % — SIGNIFICANT CHANGE UP (ref 13–44)
LYMPHOCYTES # BLD AUTO: 2.43 K/UL — SIGNIFICANT CHANGE UP (ref 1–3.3)
MAGNESIUM SERPL-MCNC: 2.1 MG/DL — SIGNIFICANT CHANGE UP (ref 1.6–2.6)
MANUAL SMEAR VERIFICATION: SIGNIFICANT CHANGE UP
MCHC RBC-ENTMCNC: 24.9 PG — LOW (ref 27–34)
MCHC RBC-ENTMCNC: 30.3 G/DL — LOW (ref 32–36)
MCV RBC AUTO: 82.1 FL — SIGNIFICANT CHANGE UP (ref 80–100)
MICROCYTES BLD QL: SLIGHT — SIGNIFICANT CHANGE UP
MONOCYTES # BLD AUTO: 1.57 K/UL — HIGH (ref 0–0.9)
MONOCYTES NFR BLD AUTO: 9.3 % — SIGNIFICANT CHANGE UP (ref 2–14)
NEUTROPHILS # BLD AUTO: 12.61 K/UL — HIGH (ref 1.8–7.4)
NEUTROPHILS NFR BLD AUTO: 74.6 % — SIGNIFICANT CHANGE UP (ref 43–77)
NITRITE UR-MCNC: NEGATIVE — SIGNIFICANT CHANGE UP
PH UR: 5.5 — SIGNIFICANT CHANGE UP (ref 5–8)
PLAT MORPH BLD: NORMAL — SIGNIFICANT CHANGE UP
PLATELET # BLD AUTO: 315 K/UL — SIGNIFICANT CHANGE UP (ref 150–400)
POIKILOCYTOSIS BLD QL AUTO: SLIGHT — SIGNIFICANT CHANGE UP
POLYCHROMASIA BLD QL SMEAR: SLIGHT — SIGNIFICANT CHANGE UP
POTASSIUM SERPL-MCNC: 3.7 MMOL/L — SIGNIFICANT CHANGE UP (ref 3.5–5.3)
POTASSIUM SERPL-SCNC: 3.7 MMOL/L — SIGNIFICANT CHANGE UP (ref 3.5–5.3)
PROT SERPL-MCNC: 8.2 G/DL — SIGNIFICANT CHANGE UP (ref 6–8.3)
PROT UR-MCNC: NEGATIVE MG/DL — SIGNIFICANT CHANGE UP
PROTHROM AB SERPL-ACNC: 12.6 SEC — SIGNIFICANT CHANGE UP (ref 9.9–13.4)
RBC # BLD: 5.3 M/UL — HIGH (ref 3.8–5.2)
RBC # FLD: 14 % — SIGNIFICANT CHANGE UP (ref 10.3–14.5)
RBC BLD AUTO: ABNORMAL
RBC CASTS # UR COMP ASSIST: 0 /HPF — SIGNIFICANT CHANGE UP (ref 0–4)
SODIUM SERPL-SCNC: 136 MMOL/L — SIGNIFICANT CHANGE UP (ref 135–145)
SP GR SPEC: 1.01 — SIGNIFICANT CHANGE UP (ref 1–1.03)
SQUAMOUS # UR AUTO: 6 /HPF — HIGH (ref 0–5)
STOMATOCYTES BLD QL SMEAR: SLIGHT — SIGNIFICANT CHANGE UP
UROBILINOGEN FLD QL: 0.2 MG/DL — SIGNIFICANT CHANGE UP (ref 0.2–1)
WBC # BLD: 16.9 K/UL — HIGH (ref 3.8–10.5)
WBC # FLD AUTO: 16.9 K/UL — HIGH (ref 3.8–10.5)
WBC UR QL: 92 /HPF — HIGH (ref 0–5)

## 2025-03-06 PROCEDURE — 74177 CT ABD & PELVIS W/CONTRAST: CPT | Mod: 26

## 2025-03-06 PROCEDURE — 99223 1ST HOSP IP/OBS HIGH 75: CPT

## 2025-03-06 PROCEDURE — 99285 EMERGENCY DEPT VISIT HI MDM: CPT

## 2025-03-06 RX ORDER — OXYCODONE HYDROCHLORIDE 30 MG/1
10 TABLET ORAL EVERY 4 HOURS
Refills: 0 | Status: DISCONTINUED | OUTPATIENT
Start: 2025-03-06 | End: 2025-03-11

## 2025-03-06 RX ORDER — BISACODYL 5 MG
5 TABLET, DELAYED RELEASE (ENTERIC COATED) ORAL DAILY
Refills: 0 | Status: DISCONTINUED | OUTPATIENT
Start: 2025-03-06 | End: 2025-03-11

## 2025-03-06 RX ORDER — ACETAMINOPHEN 500 MG/5ML
650 LIQUID (ML) ORAL EVERY 6 HOURS
Refills: 0 | Status: DISCONTINUED | OUTPATIENT
Start: 2025-03-06 | End: 2025-03-11

## 2025-03-06 RX ORDER — SENNA 187 MG
2 TABLET ORAL AT BEDTIME
Refills: 0 | Status: DISCONTINUED | OUTPATIENT
Start: 2025-03-06 | End: 2025-03-11

## 2025-03-06 RX ORDER — POLYETHYLENE GLYCOL 3350 17 G/17G
17 POWDER, FOR SOLUTION ORAL DAILY
Refills: 0 | Status: DISCONTINUED | OUTPATIENT
Start: 2025-03-06 | End: 2025-03-11

## 2025-03-06 RX ORDER — ONDANSETRON HCL/PF 4 MG/2 ML
4 VIAL (ML) INJECTION EVERY 8 HOURS
Refills: 0 | Status: DISCONTINUED | OUTPATIENT
Start: 2025-03-06 | End: 2025-03-11

## 2025-03-06 RX ORDER — MELATONIN 5 MG
3 TABLET ORAL AT BEDTIME
Refills: 0 | Status: DISCONTINUED | OUTPATIENT
Start: 2025-03-06 | End: 2025-03-11

## 2025-03-06 RX ORDER — NALOXONE HYDROCHLORIDE 0.4 MG/ML
0.4 INJECTION, SOLUTION INTRAMUSCULAR; INTRAVENOUS; SUBCUTANEOUS ONCE
Refills: 0 | Status: DISCONTINUED | OUTPATIENT
Start: 2025-03-06 | End: 2025-03-11

## 2025-03-06 RX ORDER — SODIUM CHLORIDE 9 G/1000ML
1000 INJECTION, SOLUTION INTRAVENOUS ONCE
Refills: 0 | Status: COMPLETED | OUTPATIENT
Start: 2025-03-06 | End: 2025-03-06

## 2025-03-06 RX ORDER — MAGNESIUM, ALUMINUM HYDROXIDE 200-200 MG
30 TABLET,CHEWABLE ORAL EVERY 4 HOURS
Refills: 0 | Status: DISCONTINUED | OUTPATIENT
Start: 2025-03-06 | End: 2025-03-11

## 2025-03-06 RX ORDER — ONDANSETRON HCL/PF 4 MG/2 ML
4 VIAL (ML) INJECTION ONCE
Refills: 0 | Status: COMPLETED | OUTPATIENT
Start: 2025-03-06 | End: 2025-03-06

## 2025-03-06 RX ORDER — CEFTRIAXONE 500 MG/1
1000 INJECTION, POWDER, FOR SOLUTION INTRAMUSCULAR; INTRAVENOUS EVERY 24 HOURS
Refills: 0 | Status: DISCONTINUED | OUTPATIENT
Start: 2025-03-06 | End: 2025-03-07

## 2025-03-06 RX ORDER — OXYCODONE HYDROCHLORIDE 30 MG/1
5 TABLET ORAL EVERY 4 HOURS
Refills: 0 | Status: DISCONTINUED | OUTPATIENT
Start: 2025-03-06 | End: 2025-03-11

## 2025-03-06 RX ORDER — ENOXAPARIN SODIUM 100 MG/ML
40 INJECTION SUBCUTANEOUS EVERY 24 HOURS
Refills: 0 | Status: DISCONTINUED | OUTPATIENT
Start: 2025-03-06 | End: 2025-03-11

## 2025-03-06 RX ORDER — ACETAMINOPHEN 500 MG/5ML
1000 LIQUID (ML) ORAL ONCE
Refills: 0 | Status: COMPLETED | OUTPATIENT
Start: 2025-03-06 | End: 2025-03-06

## 2025-03-06 RX ADMIN — Medication 400 MILLIGRAM(S): at 13:36

## 2025-03-06 RX ADMIN — Medication 100 MILLILITER(S): at 22:10

## 2025-03-06 RX ADMIN — CEFTRIAXONE 100 MILLIGRAM(S): 500 INJECTION, POWDER, FOR SOLUTION INTRAMUSCULAR; INTRAVENOUS at 23:31

## 2025-03-06 RX ADMIN — Medication 4 MILLIGRAM(S): at 13:35

## 2025-03-06 RX ADMIN — SODIUM CHLORIDE 1000 MILLILITER(S): 9 INJECTION, SOLUTION INTRAVENOUS at 13:36

## 2025-03-06 RX ADMIN — Medication 650 MILLIGRAM(S): at 21:00

## 2025-03-06 RX ADMIN — Medication 650 MILLIGRAM(S): at 21:46

## 2025-03-06 NOTE — ED ADULT NURSE REASSESSMENT NOTE - NS ED NURSE REASSESS COMMENT FT1
patient oral temp 100. MONALISA Vargas made aware via TEAMS to be made aware and for prn tylenol order

## 2025-03-06 NOTE — H&P ADULT - NSHPREVIEWOFSYSTEMS_GEN_ALL_CORE
CONSTITUTIONAL: No fever. no weakness  ENMT:  No sinus or throat pain  RESPIRATORY: No cough, wheezing, chills or hemoptysis; No shortness of breath  CARDIOVASCULAR: No chest pain, palpitations, dizziness, or leg swelling  GASTROINTESTINAL: +abdominal pain. No nausea, vomiting, or hematemesis; No diarrhea or constipation. No melena or hematochezia.  GENITOURINARY: No dysuria or incontinence  NEUROLOGICAL: No headaches, memory loss, loss of strength, numbness, or tremors  SKIN: No rashes,  No hives or eczema  ENDOCRINE: No heat or cold intolerance; No hair loss  MUSCULOSKELETAL: No joint pain or swelling; No muscle, back, or extremity pain  PSYCHIATRIC: No depression, anxiety, mood swings, or difficulty sleeping  HEME/LYMPH: No easy bruising, or bleeding gums; no enlarged LN

## 2025-03-06 NOTE — H&P ADULT - PROBLEM SELECTOR PLAN 1
h/o ventral and umbilical hernia s/p repair w panniculectomy c/b abdominal wall seroma s/p drain placement 10/28/2024  since placement of drain, patient has required upsizing and repositioning of draining x1 on 1/29/25  currently w pain around drain site  a/w leukocytosis; otherwise, afebrile and hds  imaging shows, "Right anterior approach drainage catheter with tip looped within a 12.3 x 1.6 x 9.4 cm fluid collection in the anterior pelvic wall....Collection is slightly increased compared to 12/31/2024"  monitor for fever, worsening white count  start empirical abx therapy w rocephin  serial abdominal exams/imaging as needed  ivf + lytes as needed   analgesics and antipyretics as needed  ir consult in am  xgs consult in am  id consult in am

## 2025-03-06 NOTE — H&P ADULT - NSHPPHYSICALEXAM_GEN_ALL_CORE
T(C): 37.1 (03-06-25 @ 22:23), Max: 37.8 (03-06-25 @ 20:29)  HR: 100 (03-06-25 @ 21:02) (89 - 107)  BP: 117/68 (03-06-25 @ 21:02) (104/64 - 138/96)  RR: 18 (03-06-25 @ 21:02) (15 - 18)  SpO2: 97% (03-06-25 @ 21:02) (97% - 98%)  GENERAL: NAD, lying in bed   EYES: EOMI, PERRLA; conjunctiva and sclera clear  ENMT: Moist oral mucosa, no pharyngeal injection or exudates;    NECK: Supple, no palpable masses; no JVD  RESPIRATORY: Normal respiratory effort; lungs are clear to auscultation bilaterally  CARDIOVASCULAR: Regular rate and rhythm, normal S1 and S2, no murmur/rub/gallop; No lower extremity edema; Peripheral pulses are 2+ bilaterally  ABDOMEN: ttp over drain site, serous fluid within drain bag, normoactive bowel sounds, no rebound/guarding   MUSCULOSKELETAL  no joint swelling or tenderness to palpation  PSYCH: A+O to person, place, and time; affect appropriate  NEUROLOGY: CN 2-12 are intact and symmetric; no gross motor or sensory deficits   SKIN: No rashes; no palpable lesions

## 2025-03-06 NOTE — ED PROVIDER NOTE - OBJECTIVE STATEMENT
49y Female status post ventral and umbilical hernia repair with panniculectomy on 10/28/24 complicated by abdominal wall seroma status post drainage on 12/20/24 with Dr. Candelario Caballero and drain replaced most recently with 14fr on 2/28 presents with 2 days of abd pain at the site and increased fluid drainage. No vomiting but nausea. No fever. No changes in bowels or bladder habits. Has not taken anything for symptoms at home.

## 2025-03-06 NOTE — ED PROVIDER NOTE - PHYSICAL EXAMINATION
Gen: Alert, NAD, class ii obesity   Head: NC, AT   Eyes: PERRL, EOMI, normal lids/conjunctiva  ENT: normal hearing, patent oropharynx without erythema/exudate, uvula midline  Neck: supple, no tenderness, Trachea midline  Pulm: Bilateral BS, normal resp effort, no wheeze/stridor/retractions  CV: RRR, no M/R/G, 2+ radial and dp pulses bl, no edema  Abd: abd tenderness at drain site. serous fluid in drain bag. not tender beyond site. crusting around umbilicus  Mskel: extremities x4 with normal ROM and no joint effusions. no ctl spine ttp.   Skin: no rash, no bruising   Neuro: AAOx3, no sensory/motor deficits, CN 2-12 intact

## 2025-03-06 NOTE — ED ADULT NURSE NOTE - CAS TRG GEN SKIN CONDITION
Anticoagulation Clinic Progress Note    Anticoagulation Summary  As of 2022    INR goal:  2.0-3.0   TTR:  81.5 % (3.8 y)   INR used for dosin.4 (2022)   Warfarin maintenance plan:  7.5 mg every Fri; 5 mg all other days   Weekly warfarin total:  37.5 mg   No change documented:  Amrit Lang, PharmD   Plan last modified:  Gisele Colbert, Pharmacy Intern (2020)   Next INR check:  2022   Priority:  Maintenance   Target end date:  Indefinite    Indications    Chronic atrial fibrillation (HCC) [I48.20]             Anticoagulation Episode Summary     INR check location:      Preferred lab:      Send INR reminders to:   [facility]    Comments:        Anticoagulation Care Providers     Provider Role Specialty Phone number    Chuck Conner MD Referring Cardiology [phone]          Clinic Interview:  Patient Findings     Negatives:  Signs/symptoms of thrombosis, Signs/symptoms of bleeding,   Laboratory test error suspected, Change in health, Change in alcohol use,   Change in activity, Upcoming invasive procedure, Emergency department   visit, Upcoming dental procedure, Missed doses, Extra doses, Change in   medications, Change in diet/appetite, Hospital admission, Bruising, Other   complaints      Clinical Outcomes     Negatives:  Major bleeding event, Thromboembolic event,   Anticoagulation-related hospital admission, Anticoagulation-related ED   visit, Anticoagulation-related fatality        INR History:  Anticoagulation Monitoring 3/31/2022 2022 2022   INR 2.2 1.7 2.4   INR Date 3/31/2022 2022 2022   INR Goal 2.0-3.0 2.0-3.0 2.0-3.0   Trend Same Same Same   Last Week Total 37.5 mg 37.5 mg 37.5 mg   Next Week Total 37.5 mg 40 mg 37.5 mg   Sun 5 mg 5 mg 5 mg   Mon 5 mg 5 mg 5 mg   Tue 5 mg 5 mg 5 mg   Wed 5 mg 5 mg 5 mg   Thu 5 mg 7.5 mg (); Otherwise 5 mg 5 mg   Fri 7.5 mg 7.5 mg 7.5 mg   Sat 5 mg 5 mg 5 mg   Visit Report - - -   Some recent data  might be hidden       Plan:  1. INR is Therapeutic today- see above in Anticoagulation Summary.  Will instruct Diego Martinez to Continue their warfarin regimen- see above in Anticoagulation Summary.  2. Follow up in 4 weeks  3. Patient declines warfarin refills.  4. Verbal and written information provided. Patient expresses understanding and has no further questions at this time.    Amrit Lang, PharmD   Warm/Dry

## 2025-03-06 NOTE — CONSULT NOTE ADULT - SUBJECTIVE AND OBJECTIVE BOX
Interventional Radiology    Evaluate for Procedure: Drain evaluation    HPI: 48yo f w pmh obesity, carlo, htn, hld, ventral and umbilical hernia s/p repair w panniculectomy c/b abdominal wall seroma s/p drain placement, p/w abdominal pain over the last couple of days; denies fever, chills, diaphoresis, chest pain, sob/mcdaniels, palpitations, leg swelling, cough, dysuria. pain primarily over the site of drain insertion. patient presents to [facility] er for fruther evualations. c/f worsening seroma w malfunctioning drain; admit to medicine for further mgmt     Allergies: [This allergen will not trigger allergy alert] COLD urticaria - has epi pen (Rash; Urticaria)  adhesives (Rash)  No Known Drug Allergies    Medications (Abx/Cardiac/Anticoagulation/Blood Products)    cefTRIAXone   IVPB: 100 mL/Hr IV Intermittent (03-06 @ 23:31)  enoxaparin Injectable: 40 milliGRAM(s) SubCutaneous (03-07 @ 05:53)    Data:  162.6, 162.6  108.9, 131.5  T(C): 37.3  HR: 90  BP: 135/71  RR: 18  SpO2: 97%    -WBC 12.78 / HgB 11.9 / Hct 39.3 / Plt 252  -Na 138 / Cl 103 / BUN 8 / Glucose 96  -K 4.0 / CO2 24 / Cr 0.65  -ALT 13 / Alk Phos 89 / T.Bili 0.5  -INR 1.17 / PTT 30.1    Radiology: Reviewed    Assessment/Plan:   48yo f w pmh obesity, carlo, htn, hld, ventral and umbilical hernia s/p repair w panniculectomy c/b abdominal wall seroma s/p drain placement, p/w abdominal pain over the last couple of days; denies fever, chills, diaphoresis, chest pain, sob/mcdaniels, palpitations, leg swelling, cough, dysuria. pain primarily over the site of drain insertion. patient presents to [facility] er for fruther evualations. c/f worsening seroma w malfunctioning drain; IR consulted for drain evaluation.    - case reviewed and approved for today for abdominal drain evaluation  - please place IR procedure order under LANDEN Angel  - No need to be NPO  - d/w primary team    --  Felix Terrono, NP  Interventional Radiology  Available on [employer] TEAMS / [email or web address]    For EMERGENT inquiries/questions:  IR Pager ([facility]): [phone]    For non-emergent consults/questions:   Please place a sunrise order "Consult- Interventional Radiology" with an appropriate callback number    For questions about scheduling during appropriate work hours, call IR :  [facility]: [phone]    For outpatient IR booking:  [facility]: [phone]

## 2025-03-06 NOTE — ED ADULT NURSE NOTE - OBJECTIVE STATEMENT
PMH Morbid obesity, HLD, HTN, s/p panniculectomy and ventral/umbilical hernia repair 10/28/24 c/b abdominal wall fluid collection s/p drainage at [facility] 12/20/24. Last appointment, pt underwent upsizing of drain from 12 Fr to 14 Fr. Patient now presents to IR for repeat abdominal wall drain evaluation, patient reports output over last 4 days as 40ml/70ml/40ml and 25ml today, states initially was yellow and over past week is pink tinged. States has intermittent pain, states "shes numb from surgery but thinks sensation might be coming back". Denies fevers/chills/nausea/vomiting. PT is a 49 year old A&OX4 female with PMH of obesity, HLD, HTN, s/p panniculectomy and ventral/umbilical hernia repair 10/28/24 c/b abdominal wall fluid collection s/p drainage at [facility] 12/20/24. Last appointment, PT underwent upsizing of drain from 12 Fr to 14 Fr in IR on 02/28/2025 and discharged home who now presents to the ED from home with c/o 2 days of abdominal pain at the site and increased fluid drainage in the bag. PT denies chest pain, SOB, N/V/D, dizziness, fevers, and chills. PT is resting comfortably in bed, breathing unlabored on room air, and speaking in complete sentences. Abdomen is soft with mild crusting around surgical site and serous fluid in drain bag. Skin is warm and dry, no diaphoresis noted. No edema noted to B/L extremities. Strong strength in B/L extremities, sensation intact. IV access established 20G in RAC. PT placed in hospital gown. Safety and comfort maintained.

## 2025-03-06 NOTE — H&P ADULT - HISTORY OF PRESENT ILLNESS
48yo f w pmh obesity, carlo, htn, hld, ventral and umbilical hernia s/p repair w panniculectomy c/b abdominal wall seroma s/p drain placement, p/w abdominal pain; in er, c/f worsening seroma w malfunctioning drain; admit to medicine for further mgmt 50yo f w pmh obesity, carlo, htn, hld, ventral and umbilical hernia s/p repair w panniculectomy c/b abdominal wall seroma s/p drain placement, p/w abdominal pain over the last couple of days; denies fever, chills, diaphoresis, chest pain, sob/mcdaniels, palpitations, leg swelling, cough, dysuria. pain primarily over the site of drain insertion. patient presents to [facility] er for fruther evualations. c/f worsening seroma w malfunctioning drain; admit to medicine for further mgmt

## 2025-03-06 NOTE — H&P ADULT - ASSESSMENT
48yo f w pmh obesity, carlo, htn, hld, ventral and umbilical hernia s/p repair w panniculectomy c/b abdominal wall seroma s/p drain placement, p/w abdominal pain; in er, c/f worsening seroma w malfunctioning drain; admit to medicine for further mgmt

## 2025-03-06 NOTE — ED PROVIDER NOTE - PROGRESS NOTE DETAILS
[facility] Sh[facility], DO (PGY2): Spoke with IR, patient went to the office earlier today because of abdominal pain and worsening drainage.  They were unable to perform a reliable abdominal exam and unable to examining the drain due to patient's discomfort.  IR consult placed for evaluation in the morning.  Also consulted surgery, will come evaluate patient in the ED.  Otherwise, patient comfortable at this time, pain controlled.  CTAP showing increased abdominal wall collection, drain in proper place.  Lab work notable for white count 16.9, otherwise nonactionable. [facility] Shiva (PGY2): Case discussed with hospitalist. Patient admitted.

## 2025-03-07 ENCOUNTER — APPOINTMENT (OUTPATIENT)
Dept: PLASTIC SURGERY | Facility: CLINIC | Age: 50
End: 2025-03-07

## 2025-03-07 DIAGNOSIS — R82.81 PYURIA: ICD-10-CM

## 2025-03-07 LAB
A1C WITH ESTIMATED AVERAGE GLUCOSE RESULT: 5.8 % — HIGH (ref 4–5.6)
ALBUMIN SERPL ELPH-MCNC: 3.7 G/DL — SIGNIFICANT CHANGE UP (ref 3.3–5)
ALP SERPL-CCNC: 89 U/L — SIGNIFICANT CHANGE UP (ref 40–120)
ALT FLD-CCNC: 13 U/L — SIGNIFICANT CHANGE UP (ref 10–45)
ANION GAP SERPL CALC-SCNC: 11 MMOL/L — SIGNIFICANT CHANGE UP (ref 5–17)
APTT BLD: 30.1 SEC — SIGNIFICANT CHANGE UP (ref 24.5–35.6)
AST SERPL-CCNC: 9 U/L — LOW (ref 10–40)
BASOPHILS # BLD AUTO: 0.09 K/UL — SIGNIFICANT CHANGE UP (ref 0–0.2)
BASOPHILS NFR BLD AUTO: 0.7 % — SIGNIFICANT CHANGE UP (ref 0–2)
BILIRUB SERPL-MCNC: 0.5 MG/DL — SIGNIFICANT CHANGE UP (ref 0.2–1.2)
BUN SERPL-MCNC: 8 MG/DL — SIGNIFICANT CHANGE UP (ref 7–23)
CALCIUM SERPL-MCNC: 8.9 MG/DL — SIGNIFICANT CHANGE UP (ref 8.4–10.5)
CHLORIDE SERPL-SCNC: 103 MMOL/L — SIGNIFICANT CHANGE UP (ref 96–108)
CHOLEST SERPL-MCNC: 200 MG/DL — HIGH
CO2 SERPL-SCNC: 24 MMOL/L — SIGNIFICANT CHANGE UP (ref 22–31)
CREAT SERPL-MCNC: 0.65 MG/DL — SIGNIFICANT CHANGE UP (ref 0.5–1.3)
EGFR: 109 ML/MIN/1.73M2 — SIGNIFICANT CHANGE UP
EGFR: 109 ML/MIN/1.73M2 — SIGNIFICANT CHANGE UP
EOSINOPHIL # BLD AUTO: 0.12 K/UL — SIGNIFICANT CHANGE UP (ref 0–0.5)
EOSINOPHIL NFR BLD AUTO: 0.9 % — SIGNIFICANT CHANGE UP (ref 0–6)
ESTIMATED AVERAGE GLUCOSE: 120 MG/DL — HIGH (ref 68–114)
GLUCOSE SERPL-MCNC: 96 MG/DL — SIGNIFICANT CHANGE UP (ref 70–99)
HCT VFR BLD CALC: 39.3 % — SIGNIFICANT CHANGE UP (ref 34.5–45)
HDLC SERPL-MCNC: 58 MG/DL — SIGNIFICANT CHANGE UP
HGB BLD-MCNC: 11.9 G/DL — SIGNIFICANT CHANGE UP (ref 11.5–15.5)
IMM GRANULOCYTES NFR BLD AUTO: 0.5 % — SIGNIFICANT CHANGE UP (ref 0–0.9)
INR BLD: 1.17 RATIO — HIGH (ref 0.85–1.16)
LIPID PNL WITH DIRECT LDL SERPL: 129 MG/DL — HIGH
LYMPHOCYTES # BLD AUTO: 1.89 K/UL — SIGNIFICANT CHANGE UP (ref 1–3.3)
LYMPHOCYTES # BLD AUTO: 14.8 % — SIGNIFICANT CHANGE UP (ref 13–44)
MCHC RBC-ENTMCNC: 25.9 PG — LOW (ref 27–34)
MCHC RBC-ENTMCNC: 30.3 G/DL — LOW (ref 32–36)
MCV RBC AUTO: 85.6 FL — SIGNIFICANT CHANGE UP (ref 80–100)
MONOCYTES # BLD AUTO: 1.45 K/UL — HIGH (ref 0–0.9)
MONOCYTES NFR BLD AUTO: 11.3 % — SIGNIFICANT CHANGE UP (ref 2–14)
NEUTROPHILS # BLD AUTO: 9.16 K/UL — HIGH (ref 1.8–7.4)
NEUTROPHILS NFR BLD AUTO: 71.8 % — SIGNIFICANT CHANGE UP (ref 43–77)
NON HDL CHOLESTEROL: 142 MG/DL — HIGH
NRBC BLD AUTO-RTO: 0 /100 WBCS — SIGNIFICANT CHANGE UP (ref 0–0)
PLATELET # BLD AUTO: 252 K/UL — SIGNIFICANT CHANGE UP (ref 150–400)
POTASSIUM SERPL-MCNC: 4 MMOL/L — SIGNIFICANT CHANGE UP (ref 3.5–5.3)
POTASSIUM SERPL-SCNC: 4 MMOL/L — SIGNIFICANT CHANGE UP (ref 3.5–5.3)
PROT SERPL-MCNC: 7.2 G/DL — SIGNIFICANT CHANGE UP (ref 6–8.3)
PROTHROM AB SERPL-ACNC: 13.3 SEC — SIGNIFICANT CHANGE UP (ref 9.9–13.4)
RBC # BLD: 4.59 M/UL — SIGNIFICANT CHANGE UP (ref 3.8–5.2)
RBC # FLD: 14.1 % — SIGNIFICANT CHANGE UP (ref 10.3–14.5)
SODIUM SERPL-SCNC: 138 MMOL/L — SIGNIFICANT CHANGE UP (ref 135–145)
TRIGL SERPL-MCNC: 74 MG/DL — SIGNIFICANT CHANGE UP
WBC # BLD: 12.78 K/UL — HIGH (ref 3.8–10.5)
WBC # FLD AUTO: 12.78 K/UL — HIGH (ref 3.8–10.5)

## 2025-03-07 PROCEDURE — G0545: CPT

## 2025-03-07 PROCEDURE — 99231 SBSQ HOSP IP/OBS SF/LOW 25: CPT

## 2025-03-07 PROCEDURE — 49424 ASSESS CYST CONTRAST INJECT: CPT

## 2025-03-07 PROCEDURE — 76080 X-RAY EXAM OF FISTULA: CPT | Mod: 26

## 2025-03-07 PROCEDURE — 99223 1ST HOSP IP/OBS HIGH 75: CPT | Mod: GC

## 2025-03-07 PROCEDURE — 99233 SBSQ HOSP IP/OBS HIGH 50: CPT

## 2025-03-07 RX ORDER — VANCOMYCIN HCL IN 5 % DEXTROSE 1.5G/250ML
PLASTIC BAG, INJECTION (ML) INTRAVENOUS
Refills: 0 | Status: DISCONTINUED | OUTPATIENT
Start: 2025-03-07 | End: 2025-03-09

## 2025-03-07 RX ORDER — VANCOMYCIN HCL IN 5 % DEXTROSE 1.5G/250ML
1500 PLASTIC BAG, INJECTION (ML) INTRAVENOUS EVERY 12 HOURS
Refills: 0 | Status: DISCONTINUED | OUTPATIENT
Start: 2025-03-08 | End: 2025-03-09

## 2025-03-07 RX ORDER — VANCOMYCIN HCL IN 5 % DEXTROSE 1.5G/250ML
1500 PLASTIC BAG, INJECTION (ML) INTRAVENOUS ONCE
Refills: 0 | Status: COMPLETED | OUTPATIENT
Start: 2025-03-07 | End: 2025-03-07

## 2025-03-07 RX ADMIN — POLYETHYLENE GLYCOL 3350 17 GRAM(S): 17 POWDER, FOR SOLUTION ORAL at 16:27

## 2025-03-07 RX ADMIN — ENOXAPARIN SODIUM 40 MILLIGRAM(S): 100 INJECTION SUBCUTANEOUS at 05:53

## 2025-03-07 RX ADMIN — Medication 300 MILLIGRAM(S): at 18:49

## 2025-03-07 RX ADMIN — Medication 2 TABLET(S): at 21:21

## 2025-03-07 RX ADMIN — Medication 1 APPLICATION(S): at 16:28

## 2025-03-07 RX ADMIN — Medication 650 MILLIGRAM(S): at 16:33

## 2025-03-07 NOTE — PRE-OP CHECKLIST - ASSESSMENT, HISTORY & PHYSICAL COMPLETED AND ON MEDICAL RECORD
done
Identifies reasons for living/Supportive social network of family or friends/Responsibility to family and others/Engaged in work or school/Has future plans

## 2025-03-07 NOTE — CONSULT NOTE ADULT - CONSULT REASON
abdomnial wall abscess
abdominal wall seroma sp panniculectomy
Worsening abdominal wall collection. Drain in place

## 2025-03-07 NOTE — CONSULT NOTE ADULT - ASSESSMENT
ASSESSMENT/PLAN:   RAFAEL TSANG 49F PMH obesity, NICOLÁS, HTN, HLD, ventral and umbilical hernia s/p repair w panniculectomy 10/28/24 c/b abdominal wall seroma s/p drain placement by IR multiple times most recently 02/28, p/w 2 days of abdominal pain and increased drain output. Presented to IR clinic and sent to ED for increased abdominal pain, CTAP with drain appropriately placed and slight interval increase in abdominal wall collection compared to 12/2024. Patient noticed thick yellow fluid in drain tubing yesterday, output otherwise serous. Denies purulent/milky drain output. Afebrile with WBC 16.9 in the ED. Plastic surgery consulted for management of seroma. Plan for elective RTOR for seroma evacuation.    - Plan for eventual elective seroma evacuation in OR  - Pt to be seen by IR  - Monitor IR drain  - Trend WBC  - Continue CTX  - Regular diet   - Pain control  - Activity as tolerated; encourage ambulation  - Continue DVT prophylaxis  - Finalized plan pending discussion with attending surgeon Dr. Shikowitz-Behr    Plastic Surgery   [place]: [id number]  [facility]: [phone]
Patient is a 49 year old female with PMH of obesity, NICOLÁS,  HTN, HLD, ventral and umbilical hernia s/p repair w panniculectomy on 10/28/2024 complicated by abdominal wall seroma s/p drain placement in 12/2024, most recent drain check on 2/28 with IR showing clogged drain with large superior collection superior to drain and drain holes clogged with fibrinous material requiring new 14 Iranian drain placement and decompression of collection, sent in by IR for evaluation after she presented for repeat drain check today but was complaining of diffuse abdominal pain x2 days associated with nausea, inrceased drain output, and change in drain output. Patient hemodynamically stable since presentation. Labs with leukocytosis to 16.90. CT abdomen plevis showing In the ER:  12.3 x 1.6 x 9.4 cm fluid collection in the anterior pelvic wall increased compared to 12/31/2024.     Repeat drain check performed today with IR- op note reported aspiration of 2- 3 ccs of serous fluid aspirated.    Plastis surgery already consulted with plan to take to OR for elective seroma evacuation    Abx:   Ceftriaxone ( 3/6-)     #post op abdominal wall collection, increased in size  -stop Ceftriaxone and switch to Vancomycin 1500 mg IVPB q12h . monitor Vancomycin troughs  -f/u IR aspirate culture  -recommend inpatient evaluation for seroma evacuation given fluid collection has been persistent and concern that it may continue to get infected without definitive source control. If plan for inpatient OR evacuation, would send for bacterial culture and gram stain   -f/u all culture data  -monitor WBC and fever curve     Case seen and discussed with Dr. Olea who agrees with assessment and plan. Note not final until attending addendum.

## 2025-03-07 NOTE — PATIENT PROFILE ADULT - CONTRAINDICATIONS & PRECAUTIONS (SELECT ALL THAT APPLY)
In order to meet Medicare requirements, the clinical documentation must support the information cited in the admission order.  Please be sure to provide detailed and clear documentation about the following in the admitting note/history and physical: Patient/surrogate refused vaccine...

## 2025-03-07 NOTE — CONSULT NOTE ADULT - SUBJECTIVE AND OBJECTIVE BOX
Patient is a 49 year old female with PMH of obesity, NICOLÁS,  HTN, HLD,  ventral and umbilical hernia s/p repair w panniculectomy on 10/28/2024 complicated by abdominal wall seroma s/p initial drain placement, most recent drain check on 2/28 with IR showing clogged drain with large superior collection superior to drain and drain holes clogged with fibrinous material requiring new 14 Burkinan drain placement and decompression of collectoin, sent in by IR for evaluation after she presented for repeat drain check today but was complainign of diffuse abdominal pain for the past 2 days.     In the ER:   VSS  Labs: CBC with leukocytosis to 16.90. U/A with 92 WBCs.   CT abdomen pelvis: Chronic abdominal wall fluid collection slightly increased in size - "Right anterior approach drainage catheter with tip looped within a 12.3 x 1.6 x 9.4 cm fluid collection in the anterior pelvic wall (301, 95/601, 80). Collection is slightly increased compared to 12/31/2024. No fluid tracking along the catheter"    Repeat drain check performed today with IR. As per op note- "On initial aspiration 2-3 cc serous fluid able to be aspirated for culture. Hand injection of contrast into existing abdominal drain demonstrated pigtail central within moderate sized amorphic cavity. Collection was smaller compared to prior drain study. Drain was not clogged. Collection was decompressed. Drain flushed, left in place and reconnected to gravity bag with new DSD."     Plastis surgery already consulted with plan to take to OR for seroma evacuation.     Abx:   Ceftriaxone ( 3/6-)        REVIEW OF SYSTEMS  pending full examination    prior hospital charts reviewed [V]  primary team notes reviewed [V]  other consultant notes reviewed [V]    PAST MEDICAL & SURGICAL HISTORY:  Morbid Obesity  BMI 64.2      metorrhagia/Bleeding  s/p D&C      personal h/o Iron Deficiency Anemia      Vertigo  last episode 2019      personal h/o Fatty Liver documented on testing      H/O cholelithiasis      H/O Helicobacter infection  2018 and treated      H/O trichomoniasis  treated 10/2022      NICOLÁS (obstructive sleep apnea)  moderate - no CPAP      COVID-19 virus infection  12/2021 fever, hedaches and runny nose      HTN (hypertension)      Fatty liver      Panniculitis      Severe obesity (BMI >= 40)      Ventral hernia      Umbilical hernia      History of D&C  2011      S/P endoscopy  10/4/22 - to assess esophageal varices - no treatment at this time - stable      H/O gastric sleeve      History of dilation and curettage          SOCIAL HISTORY:  Denied smoking/vaping/alcohol/recreational drug use    FAMILY HISTORY:      Allergies  [This allergen will not trigger allergy alert] COLD urticaria - has epi pen (Rash; Urticaria)  adhesives (Rash)  No Known Drug Allergies        ANTIMICROBIALS:  cefTRIAXone   IVPB 1000 every 24 hours      ANTIMICROBIALS (past 90 days):  MEDICATIONS  (STANDING):  cefTRIAXone   IVPB   100 mL/Hr IV Intermittent (03-06-25 @ 23:31)        OTHER MEDS:   MEDICATIONS  (STANDING):  acetaminophen     Tablet .. 650 every 6 hours PRN  aluminum hydroxide/magnesium hydroxide/simethicone Suspension 30 every 4 hours PRN  bisacodyl 5 daily PRN  enoxaparin Injectable 40 every 24 hours  melatonin 3 at bedtime PRN  ondansetron Injectable 4 every 8 hours PRN  oxyCODONE    IR 5 every 4 hours PRN  oxyCODONE    IR 10 every 4 hours PRN  polyethylene glycol 3350 17 daily  senna 2 at bedtime      VITALS:  Vital Signs Last 24 Hrs  T(F): 97.6 (03-07-25 @ 12:29), Max: 100 (03-06-25 @ 20:29)    Vital Signs Last 24 Hrs  HR: 101 (03-07-25 @ 12:29) (85 - 101)  BP: 122/69 (03-07-25 @ 12:29) (104/64 - 135/71)  RR: 18 (03-07-25 @ 12:29)  SpO2: 98% (03-07-25 @ 12:29) (97% - 98%)  Wt(kg): --    EXAM:  pending full examination      Labs:                        11.9   12.78 )-----------( 252      ( 07 Mar 2025 06:42 )             39.3     03-07    138  |  103  |  8   ----------------------------<  96  4.0   |  24  |  0.65    Ca    8.9      07 Mar 2025 06:42  Mg     2.1     03-06    TPro  7.2  /  Alb  3.7  /  TBili  0.5  /  DBili  x   /  AST  9[L]  /  ALT  13  /  AlkPhos  89  03-07      WBC Trend:  WBC Count: 12.78 (03-07-25 @ 06:42)  WBC Count: 16.90 (03-06-25 @ 13:39)      Auto Neutrophil #: 11.92 K/uL (10-30-24 @ 15:18)      Creatine Trend:  Creatinine: 0.65 (03-07)  Creatinine: 0.65 (03-06)      Liver Biochemical Testing Trend:  Alanine Aminotransferase (ALT/SGPT): 13 (03-07)  Alanine Aminotransferase (ALT/SGPT): 14 (03-06)  Alanine Aminotransferase (ALT/SGPT): 16 (10-30)  Aspartate Aminotransferase (AST/SGOT): 9 (03-07-25 @ 06:42)  Aspartate Aminotransferase (AST/SGOT): 13 (03-06-25 @ 13:39)  Aspartate Aminotransferase (AST/SGOT): 10 (10-30-24 @ 15:18)  Bilirubin Total: 0.5 (03-07)  Bilirubin Total: 0.4 (03-06)  Bilirubin Total: 0.2 (10-30)      Trend LDH          MICROBIOLOGY:        Culture - Body Fluid with Gram Stain (collected 20 Dec 2024 11:00)  Source: Abdominal Fl  Final Report:    No growth at 5 days    Urine Culture - 48 Hour Hold (collected 30 Oct 2024 22:15)  Source: .Urine  Final Report:    No growth    Culture - Blood (collected 30 Oct 2024 18:00)  Source: .Blood BLOOD  Final Report:    No growth at 5 days    Culture - Blood (collected 30 Oct 2024 17:50)  Source: .Blood BLOOD  Final Report:    No growth at 5 days    Culture - Urine (collected 02 Nov 2023 02:40)  Source: Clean Catch Clean Catch (Midstream)  Final Report:    <10,000 CFU/mL Normal Urogenital Kristie                                                    RADIOLOGY:  imaging below personally reviewed   Patient is a 49 year old female with PMH of obesity, NICOLÁS,  HTN, HLD, ventral and umbilical hernia s/p repair w panniculectomy on 10/28/2024 complicated by abdominal wall seroma s/p drain placement in 12/2024, most recent drain check on 2/28 with IR showing clogged drain with large superior collection superior to drain and drain holes clogged with fibrinous material requiring new 14 Nepali drain placement and decompression of collection, sent in by IR for evaluation after she presented for repeat drain check today but was complaining of diffuse abdominal pain. Patient states for the past 2 days she has been having constant, severe abdominal pain associated with some nausea. No vomiting, fevers, chills. States just prior to her drain check, she had been putting out 15 ccs per day but after most recent drain check, has been putting out 100 ccs per day. Also noted that her drainage is typically clear/yellow and is able to see through the drain but last night, the drainage appeared more yellow and thick stating she was not able to see the tube anymore. She called her outpatient surgeon who recommended she start her Cefadroxil which she had at home. Patient states she took 2 doses prior to coming into the hospital.     In the ER:   VSS  Labs: CBC with leukocytosis to 16.90. U/A with 92 WBCs.   CT abdomen pelvis: Chronic abdominal wall fluid collection slightly increased in size - "Right anterior approach drainage catheter with tip looped within a 12.3 x 1.6 x 9.4 cm fluid collection in the anterior pelvic wall (301, 95/601, 80). Collection is slightly increased compared to 12/31/2024. No fluid tracking along the catheter"    Repeat drain check performed today with IR. As per op note- "On initial aspiration 2-3 cc serous fluid able to be aspirated for culture. Hand injection of contrast into existing abdominal drain demonstrated pigtail central within moderate sized amorphic cavity. Collection was smaller compared to prior drain study. Drain was not clogged. Collection was decompressed. Drain flushed, left in place and reconnected to gravity bag with new DSD."     Plastis surgery already consulted with plan to take to OR for seroma evacuation.     Abx:   Ceftriaxone ( 3/6-)        REVIEW OF SYSTEMS  Constitutional: No fevers, No chills  Respiratory: No cough, no SOB  Cardiovascular:  No chest pain, No palpitations   Gastrointestinal: + pain, + nausea, No vomiting, No diarrhea, No constipation	  Genitourinary: No dysuria, No frequency, No hesitancy, No flank pain  MSK: No Joint pain, No back pain, No edema  Neurological: No HA, no weakness, no seizures, no AMS     prior hospital charts reviewed [V]  primary team notes reviewed [V]  other consultant notes reviewed [V]    PAST MEDICAL & SURGICAL HISTORY:  Morbid Obesity  BMI 64.2      metorrhagia/Bleeding  s/p D&C      personal h/o Iron Deficiency Anemia      Vertigo  last episode 2019      personal h/o Fatty Liver documented on testing      H/O cholelithiasis      H/O Helicobacter infection  2018 and treated      H/O trichomoniasis  treated 10/2022      NICOLÁS (obstructive sleep apnea)  moderate - no CPAP      COVID-19 virus infection  12/2021 fever, hedaches and runny nose      HTN (hypertension)      Fatty liver      Panniculitis      Severe obesity (BMI >= 40)      Ventral hernia      Umbilical hernia      History of D&C  2011      S/P endoscopy  10/4/22 - to assess esophageal varices - no treatment at this time - stable      H/O gastric sleeve      History of dilation and curettage          SOCIAL HISTORY:  Denied smoking/vaping/alcohol/recreational drug use  -born in the [place]  -has one cat at home     FAMILY HISTORY:  -no significant medical problems in mom or dad       Allergies  [This allergen will not trigger allergy alert] COLD urticaria - has epi pen (Rash; Urticaria)  adhesives (Rash)  No Known Drug Allergies        ANTIMICROBIALS:  cefTRIAXone   IVPB 1000 every 24 hours      ANTIMICROBIALS (past 90 days):  MEDICATIONS  (STANDING):  cefTRIAXone   IVPB   100 mL/Hr IV Intermittent (03-06-25 @ 23:31)        OTHER MEDS:   MEDICATIONS  (STANDING):  acetaminophen     Tablet .. 650 every 6 hours PRN  aluminum hydroxide/magnesium hydroxide/simethicone Suspension 30 every 4 hours PRN  bisacodyl 5 daily PRN  enoxaparin Injectable 40 every 24 hours  melatonin 3 at bedtime PRN  ondansetron Injectable 4 every 8 hours PRN  oxyCODONE    IR 5 every 4 hours PRN  oxyCODONE    IR 10 every 4 hours PRN  polyethylene glycol 3350 17 daily  senna 2 at bedtime      VITALS:  Vital Signs Last 24 Hrs  T(F): 97.6 (03-07-25 @ 12:29), Max: 100 (03-06-25 @ 20:29)    Vital Signs Last 24 Hrs  HR: 101 (03-07-25 @ 12:29) (85 - 101)  BP: 122/69 (03-07-25 @ 12:29) (104/64 - 135/71)  RR: 18 (03-07-25 @ 12:29)  SpO2: 98% (03-07-25 @ 12:29) (97% - 98%)  Wt(kg): --    EXAM:  General: Patient appears comfortable, no acute distress  HEENT: NCAT  CV: +S1/S2, RRR  Lungs: No respiratory distress, CTA b/l, no wheezing, rales or rhonchi  Abd:  +difficult to localize but tenderness throughout mid to lower abdomen. +drain located in lower abdomen without drainage currently in collection bag. BS4+  Neuro: AAOx3. No focal deficits noted.   Ext: No cyanosis, no edema  Msk: freely moving upper and lower extremities  Skin: No rash, no phlebitis, No erythema       Labs:                        11.9   12.78 )-----------( 252      ( 07 Mar 2025 06:42 )             39.3     03-07    138  |  103  |  8   ----------------------------<  96  4.0   |  24  |  0.65    Ca    8.9      07 Mar 2025 06:42  Mg     2.1     03-06    TPro  7.2  /  Alb  3.7  /  TBili  0.5  /  DBili  x   /  AST  9[L]  /  ALT  13  /  AlkPhos  89  03-07      WBC Trend:  WBC Count: 12.78 (03-07-25 @ 06:42)  WBC Count: 16.90 (03-06-25 @ 13:39)      Auto Neutrophil #: 11.92 K/uL (10-30-24 @ 15:18)      Creatine Trend:  Creatinine: 0.65 (03-07)  Creatinine: 0.65 (03-06)      Liver Biochemical Testing Trend:  Alanine Aminotransferase (ALT/SGPT): 13 (03-07)  Alanine Aminotransferase (ALT/SGPT): 14 (03-06)  Alanine Aminotransferase (ALT/SGPT): 16 (10-30)  Aspartate Aminotransferase (AST/SGOT): 9 (03-07-25 @ 06:42)  Aspartate Aminotransferase (AST/SGOT): 13 (03-06-25 @ 13:39)  Aspartate Aminotransferase (AST/SGOT): 10 (10-30-24 @ 15:18)  Bilirubin Total: 0.5 (03-07)  Bilirubin Total: 0.4 (03-06)  Bilirubin Total: 0.2 (10-30)      Trend LDH          MICROBIOLOGY:        Culture - Body Fluid with Gram Stain (collected 20 Dec 2024 11:00)  Source: Abdominal Fl  Final Report:    No growth at 5 days    Urine Culture - 48 Hour Hold (collected 30 Oct 2024 22:15)  Source: .Urine  Final Report:    No growth    Culture - Blood (collected 30 Oct 2024 18:00)  Source: .Blood BLOOD  Final Report:    No growth at 5 days    Culture - Blood (collected 30 Oct 2024 17:50)  Source: .Blood BLOOD  Final Report:    No growth at 5 days    Culture - Urine (collected 02 Nov 2023 02:40)  Source: Clean Catch Clean Catch (Midstream)  Final Report:    <10,000 CFU/mL Normal Urogenital Kristie                                                    RADIOLOGY:    ACC: [id number] EXAM:  CT ABDOMEN AND PELVIS IC   ORDERED BY:  MATTHIAS CASAS     PROCEDURE DATE:  03/06/2025          INTERPRETATION:  CLINICAL INFORMATION: Abdominal pain and increased   drainage. History of ventral or umbilical hernia repair with   panniculectomy on 10/28/2024, complicated by abdominal wall seroma status   post drainage on 12/20/2024 and drain replacement on 2/28/2025.    COMPARISON: CT abdomen pelvis 12/31/2024    CONTRAST/COMPLICATIONS:  IV Contrast: Omnipaque 350  90cc administered   10 cc discarded  Oral Contrast: NONE  .    PROCEDURE:  CT of the Abdomen and Pelvis was performed.  Sagittal and coronal reformats were performed.    FINDINGS:  LOWER CHEST: Within normal limits.    LIVER: Within normal limits.  BILE DUCTS: Normal caliber.  GALLBLADDER: Cholecystectomy.  SPLEEN: 2 cm hypodense lesion on the upper spleen with central   calcification (301, 22) similar in appearance.  PANCREAS: Within normal limits.  ADRENALS: Within normal limits.  KIDNEYS/URETERS: Symmetric renal enhancement. No hydronephrosis.   Bilateral parapelvic cysts.    BLADDER: Within normal limits.  REPRODUCTIVE ORGANS: Uterus and adnexa within normal limits. Interval   decrease in size in left adnexal cyst (301, 95).    BOWEL: No bowel obstruction. Appendix is normal. Sleeve gastrectomy.  PERITONEUM/RETROPERITONEUM: Surgical clip along the inferior wall of the   uterus.  VESSELS: Within normal limits.  LYMPH NODES: No lymphadenopathy.  ABDOMINAL WALL: Right anterior approach drainage catheter with tip looped   within a 12.3 x 1.6 x 9.4 cm fluid collection in the anterior pelvic wall   (301, 95/601, 80). Collection is slightly increased compared to   12/31/2024. No fluid tracking along the catheter.  BONES: Within normal limits.    IMPRESSION:  Chronic abdominal wall fluid collection slightly increased in size.    --- End of Report ---          JONNIE CADET MD; Resident Radiologist  This document has been electronically signed.  JEFFY BURNETTE MD; Attending Radiologist  Thisdocument has been electronically signed. Mar  6 2025  4:10PM     Patient is a 49 year old female with PMH of obesity, NICOLÁS,  HTN, HLD, ventral and umbilical hernia s/p repair w panniculectomy on 10/28/2024 complicated by abdominal wall seroma s/p drain placement in 12/2024, most recent drain check on 2/28 with IR showing clogged drain with large superior collection superior to drain and drain holes clogged with fibrinous material requiring new 14 [place]ian drain placement and decompression of collection, sent in by IR for evaluation after she presented for repeat drain check today but was complaining of diffuse abdominal pain. Patient states for the past 2 days she has been having constant, severe abdominal pain associated with some nausea. No vomiting, fevers, chills. States just prior to her drain check, she had been putting out 15 ccs per day but after most recent drain check, has been putting out 100 ccs per day. Also noted that her drainage is typically clear/yellow and is able to see through the drain but last night, the drainage appeared more yellow and thick stating she was not able to see the tube anymore. She called her outpatient surgeon who recommended she start her Cefadroxil which she had at home. Patient states she took 2 doses prior to coming into the hospital.     In the ER:   VSS  Labs: CBC with leukocytosis to 16.90. U/A with 92 WBCs.   CT abdomen pelvis: Chronic abdominal wall fluid collection slightly increased in size - "Right anterior approach drainage catheter with tip looped within a 12.3 x 1.6 x 9.4 cm fluid collection in the anterior pelvic wall (301, 95/601, 80). Collection is slightly increased compared to 12/31/2024. No fluid tracking along the catheter"    Repeat drain check performed today with IR. As per op note- "On initial aspiration 2-3 cc serous fluid able to be aspirated for culture. Hand injection of contrast into existing abdominal drain demonstrated pigtail central within moderate sized amorphic cavity. Collection was smaller compared to prior drain study. Drain was not clogged. Collection was decompressed. Drain flushed, left in place and reconnected to gravity bag with new DSD."     Plastis surgery already consulted with plan to take to OR for seroma evacuation.     Abx:   Ceftriaxone ( 3/6-)        REVIEW OF SYSTEMS  Constitutional: No fevers, No chills  Respiratory: No cough, no SOB  Cardiovascular:  No chest pain, No palpitations   Gastrointestinal: + pain, + nausea, No vomiting, No diarrhea, No constipation	  Genitourinary: No dysuria, No frequency, No hesitancy, No flank pain  MSK: No Joint pain, No back pain, No edema  Neurological: No HA, no weakness, no seizures, no AMS     prior hospital charts reviewed [V]  primary team notes reviewed [V]  other consultant notes reviewed [V]    PAST MEDICAL & SURGICAL HISTORY:  Morbid Obesity  BMI 64.2      metorrhagia/Bleeding  s/p D&C      personal h/o Iron Deficiency Anemia      Vertigo  last episode 2019      personal h/o Fatty Liver documented on testing      H/O cholelithiasis      H/O Helicobacter infection  2018 and treated      H/O trichomoniasis  treated 10/2022      NICOLÁS (obstructive sleep apnea)  moderate - no CPAP      COVID-19 virus infection  12/2021 fever, hedaches and runny nose      HTN (hypertension)      Fatty liver      Panniculitis      Severe obesity (BMI >= 40)      Ventral hernia      Umbilical hernia      History of D&C  2011      S/P endoscopy  10/4/22 - to assess esophageal varices - no treatment at this time - stable      H/O gastric sleeve      History of dilation and curettage          SOCIAL HISTORY:  Denied smoking/vaping/alcohol/recreational drug use  -[place] in the [place]  -has one cat at home     FAMILY HISTORY:  -no significant medical problems in mom or dad       Allergies  [This allergen will not trigger allergy alert] COLD urticaria - has epi pen (Rash; Urticaria)  adhesives (Rash)  No Known Drug Allergies        ANTIMICROBIALS:  cefTRIAXone   IVPB 1000 every 24 hours      ANTIMICROBIALS (past 90 days):  MEDICATIONS  (STANDING):  cefTRIAXone   IVPB   100 mL/Hr IV Intermittent (03-06-25 @ 23:31)        OTHER MEDS:   MEDICATIONS  (STANDING):  acetaminophen     Tablet .. 650 every 6 hours PRN  aluminum hydroxide/magnesium hydroxide/simethicone Suspension 30 every 4 hours PRN  bisacodyl 5 daily PRN  enoxaparin Injectable 40 every 24 hours  melatonin 3 at bedtime PRN  ondansetron Injectable 4 every 8 hours PRN  oxyCODONE    IR 5 every 4 hours PRN  oxyCODONE    IR 10 every 4 hours PRN  polyethylene glycol 3350 17 daily  senna 2 at bedtime      VITALS:  Vital Signs Last 24 Hrs  T(F): 97.6 (03-07-25 @ 12:29), Max: 100 (03-06-25 @ 20:29)    Vital Signs Last 24 Hrs  HR: 101 (03-07-25 @ 12:29) (85 - 101)  BP: 122/69 (03-07-25 @ 12:29) (104/64 - 135/71)  RR: 18 (03-07-25 @ 12:29)  SpO2: 98% (03-07-25 @ 12:29) (97% - 98%)  Wt(kg): --    EXAM:  General: Patient appears comfortable, no acute distress  HEENT: NCAT  CV: +S1/S2, RRR  Lungs: No respiratory distress, CTA b/l, no wheezing, rales or rhonchi  Abd:  +difficult to localize but tenderness throughout mid to lower abdomen. +drain located in lower abdomen without drainage currently in collection bag. BS4+  Neuro: AAOx3. No focal deficits noted.   Ext: No cyanosis, no edema  Msk: freely moving upper and lower extremities  Skin: No rash, no phlebitis, No erythema       Labs:                        11.9   12.78 )-----------( 252      ( 07 Mar 2025 06:42 )             39.3     03-07    138  |  103  |  8   ----------------------------<  96  4.0   |  24  |  0.65    Ca    8.9      07 Mar 2025 06:42  Mg     2.1     03-06    TPro  7.2  /  Alb  3.7  /  TBili  0.5  /  DBili  x   /  AST  9[L]  /  ALT  13  /  AlkPhos  89  03-07      WBC Trend:  WBC Count: 12.78 (03-07-25 @ 06:42)  WBC Count: 16.90 (03-06-25 @ 13:39)      Auto Neutrophil #: 11.92 K/uL (10-30-24 @ 15:18)      Creatine Trend:  Creatinine: 0.65 (03-07)  Creatinine: 0.65 (03-06)      Liver Biochemical Testing Trend:  Alanine Aminotransferase (ALT/SGPT): 13 (03-07)  Alanine Aminotransferase (ALT/SGPT): 14 (03-06)  Alanine Aminotransferase (ALT/SGPT): 16 (10-30)  Aspartate Aminotransferase (AST/SGOT): 9 (03-07-25 @ 06:42)  Aspartate Aminotransferase (AST/SGOT): 13 (03-06-25 @ 13:39)  Aspartate Aminotransferase (AST/SGOT): 10 (10-30-24 @ 15:18)  Bilirubin Total: 0.5 (03-07)  Bilirubin Total: 0.4 (03-06)  Bilirubin Total: 0.2 (10-30)      Trend LDH          MICROBIOLOGY:        Culture - Body Fluid with Gram Stain (collected 20 Dec 2024 11:00)  Source: Abdominal Fl  Final Report:    No growth at 5 days    Urine Culture - 48 Hour Hold (collected 30 Oct 2024 22:15)  Source: .Urine  Final Report:    No growth    Culture - Blood (collected 30 Oct 2024 18:00)  Source: .Blood BLOOD  Final Report:    No growth at 5 days    Culture - Blood (collected 30 Oct 2024 17:50)  Source: .Blood BLOOD  Final Report:    No growth at 5 days    Culture - Urine (collected 02 Nov 2023 02:40)  Source: Clean Catch Clean Catch (Midstream)  Final Report:    <10,000 CFU/mL Normal Urogenital Kristie                                                    RADIOLOGY:    ACC: [id number] EXAM:  CT ABDOMEN AND PELVIS IC   ORDERED BY:  MATTHIAS CASAS     PROCEDURE DATE:  03/06/2025          INTERPRETATION:  CLINICAL INFORMATION: Abdominal pain and increased   drainage. History of ventral or umbilical hernia repair with   panniculectomy on 10/28/2024, complicated by abdominal wall seroma status   post drainage on 12/20/2024 and drain replacement on 2/28/2025.    COMPARISON: CT abdomen pelvis 12/31/2024    CONTRAST/COMPLICATIONS:  IV Contrast: Omnipaque 350  90cc administered   10 cc discarded  Oral Contrast: NONE  .    PROCEDURE:  CT of the Abdomen and Pelvis was performed.  Sagittal and coronal reformats were performed.    FINDINGS:  LOWER CHEST: Within normal limits.    LIVER: Within normal limits.  BILE DUCTS: Normal caliber.  GALLBLADDER: Cholecystectomy.  SPLEEN: 2 cm hypodense lesion on the upper spleen with central   calcification (301, 22) similar in appearance.  PANCREAS: Within normal limits.  ADRENALS: Within normal limits.  KIDNEYS/URETERS: Symmetric renal enhancement. No hydronephrosis.   Bilateral parapelvic cysts.    BLADDER: Within normal limits.  REPRODUCTIVE ORGANS: Uterus and adnexa within normal limits. Interval   decrease in size in left adnexal cyst (301, 95).    BOWEL: No bowel obstruction. Appendix is normal. Sleeve gastrectomy.  PERITONEUM/RETROPERITONEUM: Surgical clip along the inferior wall of the   uterus.  VESSELS: Within normal limits.  LYMPH NODES: No lymphadenopathy.  ABDOMINAL WALL: Right anterior approach drainage catheter with tip looped   within a 12.3 x 1.6 x 9.4 cm fluid collection in the anterior pelvic wall   (301, 95/601, 80). Collection is slightly increased compared to   12/31/2024. No fluid tracking along the catheter.  BONES: Within normal limits.    IMPRESSION:  Chronic abdominal wall fluid collection slightly increased in size.

## 2025-03-07 NOTE — PROCEDURE NOTE - PLAN
- No IR contraindication to discharge  - Continue to flush drain with 5cc daily  - Follow up with orion

## 2025-03-07 NOTE — CONSULT NOTE ADULT - ATTENDING COMMENTS
49 f with obesity, NICOLÁS,  HTN, HLD, ventral and umbilical hernia s/p repair w panniculectomy on 10/28/2024 complicated by abdominal wall seroma s/p drain placement in 12/2024, most recent drain check on 2/28 with IR showing clogged drain with large superior collection superior to drain and drain holes clogged with fibrinous material requiring new 14 [place] drain placement and decompression of collection, sent in by IR for evaluation after she presented for repeat drain check today but was complaining of diffuse abdominal pain x2 days associated with nausea, increased drain output, and change in drain output.   afebrile, WBC: 16  Ct with 12.3 x 1.6 x 9.4 cm fluid collection in the anterior pelvic wall increased compared to 12/31/2024.   s/p Repeat drain and 2- 3 ccs of serous fluid aspirated.  Plastic planning for eventual elective seroma evacuation    s/p hernia repair and panniculectomy 10/2024 and seroma since s/p IR drain 12/2024 but all tube checks still showed the cavity so never removed, had clogged tube with fibrinous material 2/28 s/p exchange now with abd pain and leukocytosis, CT with increased size of the collection    * follow the aspiration cx  * for now start vanco 1500 q 12 and check a trough before the 4th dose  * follow plastic for surgical intervention as pt had this collection for 5 months which did not resolve with IR drainage   * monitor WBC and fever curve     The above assessment and plan was discussed with the primary team    Charlene Olea MD  contact on teams  After 5pm and on weekends call [phone]

## 2025-03-07 NOTE — PROCEDURE NOTE - NSTIMEOUT_GEN_A_CORE
January 26, 2019         Patient: Demond Arriaga   YOB: 1965   Date of Visit: 1/26/2019           To Whom it May Concern:    Demond Arriaga was seen in my clinic on 1/26/2019. Please excuse him from work on 1/28/19.     If you have any questions or concerns, please don't hesitate to call.        Sincerely,           Peng Holt P.A.-C.  Electronically Signed      Patient's first and last name, , procedure, and correct site confirmed prior to the start of procedure.

## 2025-03-07 NOTE — PRE PROCEDURE NOTE - PRE PROCEDURE EVALUATION
Interventional Radiology    HPI: 49-year-old female with a PMH Morbid obesity, HLD, HTN, s/p panniculectomy and ventral/umbilical hernia repair 10/28/24 c/b abdominal wall fluid collection s/p drainage at [facility] 12/20/24. Last appointment, pt underwent upsizing of drain from 12 Fr to 14 Fr. Most recently drain was evaluated and exchanged on 2/28 due to drain being clogged. Patient then presented to IR yesterday 3/6 for severe abdominal pain generalize across the lower abdomen for 2 days and patient was sent to the ER for infectious workup. CT confirms drain position within collection however CT demonstrated collection slightly larger since last CT 12/29/24.  Patient now presents to IR for repeat abdominal wall drain evaluation. She continues to have approximately >50cc serous yellow drainage daily.     Allergies: [This allergen will not trigger allergy alert] COLD urticaria - has epi pen (Rash; Urticaria)  adhesives (Rash)  No Known Drug Allergies    Medications (Abx/Cardiac/Anticoagulation/Blood Products)  cefTRIAXone   IVPB: 100 mL/Hr IV Intermittent (03-06 @ 23:31)  enoxaparin Injectable: 40 milliGRAM(s) SubCutaneous (03-07 @ 05:53)    Data:    T(C): 37.6  HR: 85  BP: 117/78  RR: 18  SpO2: 98%    Exam  General: No acute distress  Chest: Non labored breathing  Abdomen: Non-distended  Extremities: No swelling, warm    -WBC 12.78 / HgB 11.9 / Hct 39.3 / Plt 252  -Na 138 / Cl 103 / BUN 8 / Glucose 96  -K 4.0 / CO2 24 / Cr 0.65  -ALT 13 / Alk Phos 89 / T.Bili 0.5  -INR1.17    Imaging: Reviewed    Plan: 49y Female presents for abdominal drain evaluation.    -Risks/Benefits/alternatives explained with the patient and/or healthcare proxy and witnessed informed consent obtained.     --  Felix Angel NP  Interventional Radiology  Available on [employer] TEAMS / [email or web address]    For EMERGENT inquiries/questions:  IR Pager ([facility]): [phone]    For non-emergent consults/questions:   Please place a sunrise order "Consult- Interventional Radiology" with an appropriate callback number    For questions about scheduling during appropriate work hours, call IR :  [facility]: [phone]    For outpatient IR booking:  [facility]: [phone]

## 2025-03-07 NOTE — CONSULT NOTE ADULT - SUBJECTIVE AND OBJECTIVE BOX
Plastic Surgery Consult Note  (pg [facility]: [id number], [facility]: [phone])    HPI:  49F PMH obesity, NICOLÁS, HTN, HLD, ventral and umbilical hernia s/p repair w panniculectomy 10/28/24 c/b abdominal wall seroma s/p drain placement by IR multiple times most recently , p/w 2 days of abdominal pain and increased drain output. Presented to IR clinic and sent to ED for increased abdominal pain, CTAP with drain appropriately placed and slight interval increase in abdominal wall collection compared to 2024. Patient noticed thick yellow fluid in drain tubing yesterday, output otherwise serous. Denies purulent/milky drain output. Afebrile with WBC 16.9 in the ED. Plastic surgery consulted for management of seroma.     PAST MEDICAL & SURGICAL HISTORY:  Morbid Obesity  BMI 64.2      metorrhagia/Bleeding  s/p D&C      personal h/o Iron Deficiency Anemia      Vertigo  last episode       personal h/o Fatty Liver documented on testing      H/O cholelithiasis      H/O Helicobacter infection   and treated      H/O trichomoniasis  treated 10/2022      NICOLÁS (obstructive sleep apnea)  moderate - no CPAP      COVID-19 virus infection  2021 fever, hedaches and runny nose      HTN (hypertension)      Fatty liver      Panniculitis      Severe obesity (BMI >= 40)      Ventral hernia      Umbilical hernia      History of D&C        S/P endoscopy  10/4/22 - to assess esophageal varices - no treatment at this time - stable      H/O gastric sleeve      History of dilation and curettage        Allergies    [This allergen will not trigger allergy alert] COLD urticaria - has epi pen (Rash; Urticaria)  adhesives (Rash)  No Known Drug Allergies    Intolerances    codeine (Pruritus)  Dramamine (Other)    Home Medications:  cefadroxil 500 mg oral capsule: 1 cap(s) orally 2 times a day for 7 days (06 Mar 2025 21:11)    MEDICATIONS  (STANDING):  cefTRIAXone   IVPB 1000 milliGRAM(s) IV Intermittent every 24 hours  enoxaparin Injectable 40 milliGRAM(s) SubCutaneous every 24 hours  naloxone Injectable 0.4 milliGRAM(s) IV Push once  polyethylene glycol 3350 17 Gram(s) Oral daily  senna 2 Tablet(s) Oral at bedtime  sodium chloride 0.9%. 1000 milliLiter(s) (100 mL/Hr) IV Continuous <Continuous>      SOCIAL HISTORY:  FAMILY HISTORY:      ___________________________________________  OBJECTIVE:  Vital Signs Last 24 Hrs  T(C): 37.3 (07 Mar 2025 04:25), Max: 37.8 (06 Mar 2025 20:29)  T(F): 99.2 (07 Mar 2025 04:25), Max: 100 (06 Mar 2025 20:29)  HR: 90 (07 Mar 2025 04:25) (89 - 107)  BP: 135/71 (07 Mar 2025 04:25) (104/64 - 138/96)  BP(mean): 77 (06 Mar 2025 16:23) (77 - 77)  RR: 18 (07 Mar 2025 04:25) (15 - 18)  SpO2: 97% (07 Mar 2025 04:25) (97% - 98%)    Parameters below as of 07 Mar 2025 04:25  Patient On (Oxygen Delivery Method): room air    CAPILLARY BLOOD GLUCOSE        I&O's Detail    General: Well developed, well nourished, NAD  Neuro: Alert and oriented, no focal deficits, moves all extremities spontaneously  HEENT: NCAT  Respiratory: Airway patent, respirations unlabored  Abdomen: Soft, mildly tender RLQ. Umbilical and transverse abdominal incisions well healed. IR drain in place without leakage or erythema at drain site, serous output in bag.  Extremities: No edema, sensation and movement grossly intact  Skin: Warm, dry, appropriate color  ____________________________________________  LABS:  CBC Full  -  ( 06 Mar 2025 13:39 )  WBC Count : 16.90 K/uL  RBC Count : 5.30 M/uL  Hemoglobin : 13.2 g/dL  Hematocrit : 43.5 %  Platelet Count - Automated : 315 K/uL  Mean Cell Volume : 82.1 fl  Mean Cell Hemoglobin : 24.9 pg  Mean Cell Hemoglobin Concentration : 30.3 g/dL  Auto Neutrophil # : x  Auto Lymphocyte # : x  Auto Monocyte # : x  Auto Eosinophil # : x  Auto Basophil # : x  Auto Neutrophil % : x  Auto Lymphocyte % : x  Auto Monocyte % : x  Auto Eosinophil % : x  Auto Basophil % : x    -    136  |  100  |  8   ----------------------------<  90  3.7   |  24  |  0.65    Ca    9.7      06 Mar 2025 13:39  Mg     2.1     03-06    TPro  8.2  /  Alb  4.3  /  TBili  0.4  /  DBili  x   /  AST  13  /  ALT  14  /  AlkPhos  104  03-06    LIVER FUNCTIONS - ( 06 Mar 2025 13:39 )  Alb: 4.3 g/dL / Pro: 8.2 g/dL / ALK PHOS: 104 U/L / ALT: 14 U/L / AST: 13 U/L / GGT: x           PT/INR - ( 06 Mar 2025 13:39 )   PT: 12.6 sec;   INR: 1.11 ratio         PTT - ( 06 Mar 2025 13:39 )  PTT:31.6 sec  Urinalysis Basic - ( 06 Mar 2025 15:32 )    Color: Yellow / Appearance: Cloudy / S.010 / pH: x  Gluc: x / Ketone: Negative mg/dL  / Bili: Negative / Urobili: 0.2 mg/dL   Blood: x / Protein: Negative mg/dL / Nitrite: Negative   Leuk Esterase: Large / RBC: 0 /HPF / WBC 92 /HPF   Sq Epi: x / Non Sq Epi: 6 /HPF / Bacteria: Negative /HPF            ____________________________________________  MICRO:  RECENT CULTURES:    ____________________________________________  RADIOLOGY:

## 2025-03-08 LAB
GRAM STN FLD: ABNORMAL
GRAM STN FLD: SIGNIFICANT CHANGE UP
SPECIMEN SOURCE: SIGNIFICANT CHANGE UP

## 2025-03-08 PROCEDURE — 99232 SBSQ HOSP IP/OBS MODERATE 35: CPT

## 2025-03-08 RX ADMIN — Medication 1 APPLICATION(S): at 11:14

## 2025-03-08 RX ADMIN — Medication 300 MILLIGRAM(S): at 15:18

## 2025-03-08 RX ADMIN — Medication 2 TABLET(S): at 21:00

## 2025-03-08 RX ADMIN — POLYETHYLENE GLYCOL 3350 17 GRAM(S): 17 POWDER, FOR SOLUTION ORAL at 12:19

## 2025-03-08 RX ADMIN — ENOXAPARIN SODIUM 40 MILLIGRAM(S): 100 INJECTION SUBCUTANEOUS at 05:28

## 2025-03-09 LAB
-  CLINDAMYCIN: SIGNIFICANT CHANGE UP
-  ERYTHROMYCIN: SIGNIFICANT CHANGE UP
-  GENTAMICIN: SIGNIFICANT CHANGE UP
-  OXACILLIN: SIGNIFICANT CHANGE UP
-  RIFAMPIN: SIGNIFICANT CHANGE UP
-  TETRACYCLINE: SIGNIFICANT CHANGE UP
-  TRIMETHOPRIM/SULFAMETHOXAZOLE: SIGNIFICANT CHANGE UP
-  VANCOMYCIN: SIGNIFICANT CHANGE UP
ALBUMIN SERPL ELPH-MCNC: 3.5 G/DL — SIGNIFICANT CHANGE UP (ref 3.3–5)
ALP SERPL-CCNC: 86 U/L — SIGNIFICANT CHANGE UP (ref 40–120)
ALT FLD-CCNC: 16 U/L — SIGNIFICANT CHANGE UP (ref 10–45)
ANION GAP SERPL CALC-SCNC: 13 MMOL/L — SIGNIFICANT CHANGE UP (ref 5–17)
AST SERPL-CCNC: 12 U/L — SIGNIFICANT CHANGE UP (ref 10–40)
BASOPHILS # BLD AUTO: 0.06 K/UL — SIGNIFICANT CHANGE UP (ref 0–0.2)
BASOPHILS NFR BLD AUTO: 0.6 % — SIGNIFICANT CHANGE UP (ref 0–2)
BILIRUB SERPL-MCNC: 0.2 MG/DL — SIGNIFICANT CHANGE UP (ref 0.2–1.2)
BUN SERPL-MCNC: 10 MG/DL — SIGNIFICANT CHANGE UP (ref 7–23)
CALCIUM SERPL-MCNC: 9.5 MG/DL — SIGNIFICANT CHANGE UP (ref 8.4–10.5)
CHLORIDE SERPL-SCNC: 102 MMOL/L — SIGNIFICANT CHANGE UP (ref 96–108)
CO2 SERPL-SCNC: 25 MMOL/L — SIGNIFICANT CHANGE UP (ref 22–31)
CREAT SERPL-MCNC: 0.79 MG/DL — SIGNIFICANT CHANGE UP (ref 0.5–1.3)
EGFR: 92 ML/MIN/1.73M2 — SIGNIFICANT CHANGE UP
EGFR: 92 ML/MIN/1.73M2 — SIGNIFICANT CHANGE UP
EOSINOPHIL # BLD AUTO: 0.21 K/UL — SIGNIFICANT CHANGE UP (ref 0–0.5)
EOSINOPHIL NFR BLD AUTO: 2 % — SIGNIFICANT CHANGE UP (ref 0–6)
GLUCOSE SERPL-MCNC: 93 MG/DL — SIGNIFICANT CHANGE UP (ref 70–99)
HCT VFR BLD CALC: 36.1 % — SIGNIFICANT CHANGE UP (ref 34.5–45)
HGB BLD-MCNC: 11 G/DL — LOW (ref 11.5–15.5)
IMM GRANULOCYTES NFR BLD AUTO: 0.6 % — SIGNIFICANT CHANGE UP (ref 0–0.9)
LYMPHOCYTES # BLD AUTO: 2.43 K/UL — SIGNIFICANT CHANGE UP (ref 1–3.3)
LYMPHOCYTES # BLD AUTO: 23.4 % — SIGNIFICANT CHANGE UP (ref 13–44)
MCHC RBC-ENTMCNC: 25.2 PG — LOW (ref 27–34)
MCHC RBC-ENTMCNC: 30.5 G/DL — LOW (ref 32–36)
MCV RBC AUTO: 82.8 FL — SIGNIFICANT CHANGE UP (ref 80–100)
METHOD TYPE: SIGNIFICANT CHANGE UP
MONOCYTES # BLD AUTO: 0.89 K/UL — SIGNIFICANT CHANGE UP (ref 0–0.9)
MONOCYTES NFR BLD AUTO: 8.6 % — SIGNIFICANT CHANGE UP (ref 2–14)
NEUTROPHILS # BLD AUTO: 6.75 K/UL — SIGNIFICANT CHANGE UP (ref 1.8–7.4)
NEUTROPHILS NFR BLD AUTO: 64.8 % — SIGNIFICANT CHANGE UP (ref 43–77)
NRBC BLD AUTO-RTO: 0 /100 WBCS — SIGNIFICANT CHANGE UP (ref 0–0)
PLATELET # BLD AUTO: 308 K/UL — SIGNIFICANT CHANGE UP (ref 150–400)
POTASSIUM SERPL-MCNC: 4.2 MMOL/L — SIGNIFICANT CHANGE UP (ref 3.5–5.3)
POTASSIUM SERPL-SCNC: 4.2 MMOL/L — SIGNIFICANT CHANGE UP (ref 3.5–5.3)
PROT SERPL-MCNC: 7 G/DL — SIGNIFICANT CHANGE UP (ref 6–8.3)
RBC # BLD: 4.36 M/UL — SIGNIFICANT CHANGE UP (ref 3.8–5.2)
RBC # FLD: 14 % — SIGNIFICANT CHANGE UP (ref 10.3–14.5)
SODIUM SERPL-SCNC: 140 MMOL/L — SIGNIFICANT CHANGE UP (ref 135–145)
VANCOMYCIN TROUGH SERPL-MCNC: 11.9 UG/ML — SIGNIFICANT CHANGE UP (ref 10–20)
VANCOMYCIN TROUGH SERPL-MCNC: 13.2 UG/ML — SIGNIFICANT CHANGE UP (ref 10–20)
WBC # BLD: 10.4 K/UL — SIGNIFICANT CHANGE UP (ref 3.8–10.5)
WBC # FLD AUTO: 10.4 K/UL — SIGNIFICANT CHANGE UP (ref 3.8–10.5)

## 2025-03-09 PROCEDURE — 99232 SBSQ HOSP IP/OBS MODERATE 35: CPT

## 2025-03-09 RX ORDER — VANCOMYCIN HCL IN 5 % DEXTROSE 1.5G/250ML
1500 PLASTIC BAG, INJECTION (ML) INTRAVENOUS EVERY 8 HOURS
Refills: 0 | Status: DISCONTINUED | OUTPATIENT
Start: 2025-03-09 | End: 2025-03-10

## 2025-03-09 RX ADMIN — Medication 1 APPLICATION(S): at 11:50

## 2025-03-09 RX ADMIN — ENOXAPARIN SODIUM 40 MILLIGRAM(S): 100 INJECTION SUBCUTANEOUS at 04:21

## 2025-03-09 RX ADMIN — Medication 300 MILLIGRAM(S): at 21:03

## 2025-03-09 RX ADMIN — Medication 2 TABLET(S): at 21:04

## 2025-03-09 RX ADMIN — Medication 300 MILLIGRAM(S): at 15:20

## 2025-03-09 RX ADMIN — Medication 300 MILLIGRAM(S): at 04:21

## 2025-03-09 NOTE — CHART NOTE - NSCHARTNOTEFT_GEN_A_CORE
Patient evaluated at bedside for "swelling of the belly button". Of note patient was admitted for seroma with drain check and noted preliminary cultures growing staph aureus.    Navel is noted to be purulent and erythematous. Currently on vancomycin. WBC count downtrending.    Plan:  - continue current antibiotics  - will obtain wound culture, likely source of infection  - ID to see in AM  - Will continue to monitor.
Spoke with patient by phone regarding her concern for culture results and further management. Culture is growing few staph aureus with wide PO sensitivity. Patient seem to be responding well to IV abx at present. She report abdominal discomfort and swelling, specifically around the umbilicus.     Will continue with plan for IV then oral abx  Discussed the likelihood for surgical excision of seroma cavity which will be planned as an outpatient procedure  Patient to follow up with me in the coming week at the office, will call to schedule  No plan for urgent surgical intervention during this hospital stay  Continue conservative seroma management with IR drain in place  Please call with any further questions or concerns [phone]
Vancomycin    Notified this morning at 0530 am that Vanco infusion is still running from last night at 7pm. Per nurse patient complains of itching at the IV insertion site when the patient is itching at the site. The infusion will be finihed by 7am Nurse reports that she has tried to increase during the infusion however when the rate was increased the itching resumed. Denies sob, redness, chest pain, hives, coughing, wheezing    Vital Signs Last 24 Hrs  T(C): 36.7 (08 Mar 2025 00:00), Max: 37.6 (07 Mar 2025 12:01)  T(F): 98 (08 Mar 2025 00:00), Max: 99.6 (07 Mar 2025 12:01)  HR: 78 (08 Mar 2025 00:00) (78 - 987)  BP: 111/75 (08 Mar 2025 00:00) (111/75 - 125/72)  BP(mean): --  RR: 18 (08 Mar 2025 00:00) (18 - 18)  SpO2: 100% (08 Mar 2025 00:00) (98% - 100%)    Parameters below as of 08 Mar 2025 00:00  Patient On (Oxygen Delivery Method): room air    Plan  Next vanco dose is due now however will defer as last dose still not complete,   Spoke with pharmacy regarding the above situation.  informed to speak with the ID clinical pharmacy that opens around 8am to determine plan, no coverage overnight  Infectious disease to be notified for a possible alterative    Will endorse to day provider in order for them to follow up with the timing of next dose or an alternative, attending to follow    Susan Matthew NP  [facility] [id number]

## 2025-03-09 NOTE — PROGRESS NOTE ADULT - TIME BILLING
review of labs, imaging, notes, discussion of plan with patient, family, and consultant
review of labs, imaging, notes, discussion of plan with patient, family, and consultant
- Ordering, reviewing, and interpreting labs, testing, and imaging.  - Independently obtaining a review of systems and performing a physical exam  - Reviewing consultant documentation/recommendations in addition to discussing plan of care with consultants.  - Counselling and educating patient and family regarding interpretation of aforementioned items and plan of care.

## 2025-03-10 ENCOUNTER — TRANSCRIPTION ENCOUNTER (OUTPATIENT)
Age: 50
End: 2025-03-10

## 2025-03-10 LAB
ANION GAP SERPL CALC-SCNC: 14 MMOL/L — SIGNIFICANT CHANGE UP (ref 5–17)
BUN SERPL-MCNC: 10 MG/DL — SIGNIFICANT CHANGE UP (ref 7–23)
CALCIUM SERPL-MCNC: 9.9 MG/DL — SIGNIFICANT CHANGE UP (ref 8.4–10.5)
CHLORIDE SERPL-SCNC: 99 MMOL/L — SIGNIFICANT CHANGE UP (ref 96–108)
CO2 SERPL-SCNC: 25 MMOL/L — SIGNIFICANT CHANGE UP (ref 22–31)
CREAT SERPL-MCNC: 0.62 MG/DL — SIGNIFICANT CHANGE UP (ref 0.5–1.3)
EGFR: 110 ML/MIN/1.73M2 — SIGNIFICANT CHANGE UP
EGFR: 110 ML/MIN/1.73M2 — SIGNIFICANT CHANGE UP
GLUCOSE SERPL-MCNC: 96 MG/DL — SIGNIFICANT CHANGE UP (ref 70–99)
HCT VFR BLD CALC: 41.5 % — SIGNIFICANT CHANGE UP (ref 34.5–45)
HGB BLD-MCNC: 12.6 G/DL — SIGNIFICANT CHANGE UP (ref 11.5–15.5)
MCHC RBC-ENTMCNC: 25.5 PG — LOW (ref 27–34)
MCHC RBC-ENTMCNC: 30.4 G/DL — LOW (ref 32–36)
MCV RBC AUTO: 83.8 FL — SIGNIFICANT CHANGE UP (ref 80–100)
NRBC BLD AUTO-RTO: 0 /100 WBCS — SIGNIFICANT CHANGE UP (ref 0–0)
PLATELET # BLD AUTO: 409 K/UL — HIGH (ref 150–400)
POTASSIUM SERPL-MCNC: 4.1 MMOL/L — SIGNIFICANT CHANGE UP (ref 3.5–5.3)
POTASSIUM SERPL-SCNC: 4.1 MMOL/L — SIGNIFICANT CHANGE UP (ref 3.5–5.3)
RBC # BLD: 4.95 M/UL — SIGNIFICANT CHANGE UP (ref 3.8–5.2)
RBC # FLD: 13.8 % — SIGNIFICANT CHANGE UP (ref 10.3–14.5)
SODIUM SERPL-SCNC: 138 MMOL/L — SIGNIFICANT CHANGE UP (ref 135–145)
WBC # BLD: 11.33 K/UL — HIGH (ref 3.8–10.5)
WBC # FLD AUTO: 11.33 K/UL — HIGH (ref 3.8–10.5)

## 2025-03-10 PROCEDURE — G0545: CPT

## 2025-03-10 PROCEDURE — 99232 SBSQ HOSP IP/OBS MODERATE 35: CPT

## 2025-03-10 PROCEDURE — 99231 SBSQ HOSP IP/OBS SF/LOW 25: CPT

## 2025-03-10 RX ORDER — CEFAZOLIN SODIUM IN 0.9 % NACL 3 G/100 ML
2000 INTRAVENOUS SOLUTION, PIGGYBACK (ML) INTRAVENOUS EVERY 8 HOURS
Refills: 0 | Status: DISCONTINUED | OUTPATIENT
Start: 2025-03-10 | End: 2025-03-11

## 2025-03-10 RX ADMIN — Medication 300 MILLIGRAM(S): at 04:33

## 2025-03-10 RX ADMIN — Medication 1 APPLICATION(S): at 14:44

## 2025-03-10 RX ADMIN — Medication 2 TABLET(S): at 21:24

## 2025-03-10 RX ADMIN — ENOXAPARIN SODIUM 40 MILLIGRAM(S): 100 INJECTION SUBCUTANEOUS at 04:34

## 2025-03-10 RX ADMIN — Medication 100 MILLIGRAM(S): at 21:24

## 2025-03-10 RX ADMIN — Medication 100 MILLIGRAM(S): at 14:43

## 2025-03-10 NOTE — PHARMACOTHERAPY INTERVENTION NOTE - COMMENTS
RAFAEL TSANG, 49y Female with concern for abdominal wall collection/infection.    Patient has been receiving vancomycin 1500mg Q12H. A vancomycin level came back this morning 13.2 (~5 hours after administering vancomycin @4am. Patient's current AUC is 250 (goal 400-600), suggesting a rapid clearance of vancomycin. Based on the patient's weight, age, renal function and most recent vancomycin level, recommended the ACP covering patient to increase vancomycin to 1500mg Q8H and to collect a level pre-4th dose to ensure optimal AUC dosing.    Jacky Renteria, PharmD  PGY-1 Pharmacy Resident  [facility]  Email: [email or web address] or available on [employer]  
RAFAEL TSANG, 49y Female with concern for abdominal wall collection/infection. Patient seen with ID team on rounds, and decision to start vancomycin IV until further data/cultures come back.    Cultures from IR drainage now showing Staphylococcus aureus growth, MSSA.    Recommendation(s):  Consider narrowing vancomycin to cefazolin 2 grams IV Q8H (higher dose due to patient's weight/BMI).    With kind regards,  Alfredito Edwards, PharmD, BCIDP  Infectious Diseases Clinical Pharmacist  Available on [employer] Teams  .
RAFAEL TSANG, 49y Female with concern for abdominal wall collection/infection. Patient seen with ID team on rounds, and decision to start vancomycin IV until further data/cultures come back.    Recommendation(s):  Suggest based on patient's weight/age/renal function to give vancomycin 1500 mg IV Q12H. Can check a level pre-4th dose on this regimen.    With kind regards,  Alfredito Edwards, PharmD, BCIDP  Infectious Diseases Clinical Pharmacist  Available on [employer] Teams  .

## 2025-03-10 NOTE — DISCHARGE NOTE PROVIDER - HOSPITAL COURSE
HPI:  50yo f w pmh obesity, carlo, htn, hld, ventral and umbilical hernia s/p repair w panniculectomy c/b abdominal wall seroma s/p drain placement, p/w abdominal pain over the last couple of days; denies fever, chills, diaphoresis, chest pain, sob/mcdaniels, palpitations, leg swelling, cough, dysuria. pain primarily over the site of drain insertion. patient presents to [facility] er for fruther evualations. c/f worsening seroma w malfunctioning drain; admit to medicine for further mgmt (06 Mar 2025 21:33)    Hospital Course: Patient admitted for abdominal  pain. Ct scan of abdomen show Ct with 12.3 x 1.6 x 9.4 cm fluid collection in the anterior pelvic wall increased compared to 12/31/2024. s/p Repeat drain and 2- 3 ccs of serous fluid aspirated. Pt had drain placed for abdominal wall seroma on 2/28 done in previous admission . Patient's tube was evaluated by interventional radiology for clogged tube ; no evidence of tube clogged. She was seen by plastics and  advised elective seroma evacuation outpatient. Patient's culture from tube grew staph aureus. She was seen by infectious disease and recommended keflex for 2 weeks . Patient is hemodynamically stable . Patient to follow up outpatient with plastics.       Important Medication Changes and Reason: keflex 1 gm every 8 hours for 2 weeks    Active or Pending Issues Requiring Follow-up: Plastics     Advanced Directives:   [x ] Full code  [ ] DNR  [ ] Hospice    Discharge Diagnoses:  Abdominal wall seroma                  HPI:  50yo f w pmh obesity, carlo, htn, hld, ventral and umbilical hernia s/p repair w panniculectomy c/b abdominal wall seroma s/p drain placement, p/w abdominal pain over the last couple of days; denies fever, chills, diaphoresis, chest pain, sob/mcdaniels, palpitations, leg swelling, cough, dysuria. pain primarily over the site of drain insertion. patient presents to [facility] er for further evaluations. c/f worsening seroma w malfunctioning drain; admit to medicine for further mgmt (06 Mar 2025 21:33)    Hospital Course: Patient admitted for abdominal  pain. Ct scan of abdomen show Ct with 12.3 x 1.6 x 9.4 cm fluid collection in the anterior pelvic wall increased compared to 12/31/2024. s/p Repeat drain and 2- 3 ccs of serous fluid aspirated. Pt had drain placed for abdominal wall seroma on 2/28 done in previous admission . Patient's tube was evaluated by interventional radiology for clogged tube ; no evidence of tube clogged. She was seen by plastics and  advised elective seroma evacuation outpatient. Patient's culture from tube grew staph aureus. She was seen by infectious disease and recommended keflex for 2 weeks . Patient is hemodynamically stable . Patient to follow up outpatient with plastics.       Important Medication Changes and Reason: keflex 1 gm every 8 hours to complete 2 weeks totally     Active or Pending Issues Requiring Follow-up: Plastics     Advanced Directives:   [x ] Full code  [ ] DNR  [ ] Hospice    Discharge Diagnoses:  Abdominal wall seroma                  HPI:  50yo f w pmh obesity, carlo, htn, hld, ventral and umbilical hernia s/p repair w panniculectomy c/b abdominal wall seroma s/p drain placement, p/w abdominal pain over the last couple of days; denies fever, chills, diaphoresis, chest pain, sob/mcdaniels, palpitations, leg swelling, cough, dysuria. pain primarily over the site of drain insertion. patient presents to [facility] er for further evaluations. c/f worsening seroma w malfunctioning drain; admit to medicine for further mgmt (06 Mar 2025 21:33)    Hospital Course: Patient admitted for abdominal  pain. Ct scan of abdomen show Ct with 12.3 x 1.6 x 9.4 cm fluid collection in the anterior pelvic wall increased compared to 12/31/2024. s/p Repeat drain and 2- 3 ccs of serous fluid aspirated. Pt had drain placed for abdominal wall seroma on 2/28 done in previous admission . Patient's tube was evaluated by interventional radiology for clogged tube ; no evidence of tube clogged. She was seen by plastics and  advised elective seroma evacuation outpatient. Patient's culture from tube grew staph aureus. She was seen by infectious disease and recommended keflex for 2 weeks .  Patient independently care for joan ; taught to continue with -  -Continue to monitor and record drain output  flush drain with 5cc NS daily forward only; DO NOT aspirate  change dressing q3 days or when dressing is saturated      Patient is hemodynamically stable . Patient to follow up outpatient with plastics.       Important Medication Changes and Reason: keflex 1 gm every 8 hours to complete 2 weeks totally     Active or Pending Issues Requiring Follow-up: Plastics     Advanced Directives:   [x ] Full code  [ ] DNR  [ ] Hospice    Discharge Diagnoses:  Abdominal wall seroma

## 2025-03-10 NOTE — DISCHARGE NOTE PROVIDER - NSDCMRMEDTOKEN_GEN_ALL_CORE_FT
cefadroxil 500 mg oral capsule: 1 cap(s) orally 2 times a day for 7 days   cephalexin 500 mg oral tablet: 2 tab(s) orally every 8 hours   cephalexin 500 mg oral capsule: 2 cap(s) orally every 8 hours  cephalexin 500 mg oral tablet: 2 tab(s) orally every 8 hours   cephalexin 500 mg oral tablet: 2 tab(s) orally every 8 hours  Tums 500 mg oral tablet, chewable: 1 tab(s) chewed 2 times a day as needed for  indigestion

## 2025-03-10 NOTE — DISCHARGE NOTE PROVIDER - CARE PROVIDER_API CALL
Pt's wife Shikowitz-Behr, Lauren [facility]  Plastic Surgery  [address] [place] [address], [address]  [place] [address]  Phone: [phone]  Fax: [phone]  Follow Up Time: 1 week   Shikowitz-Behr, Lauren [facility]  Plastic Surgery  [address], [address]  [place] [address]  Phone: [phone]  Fax: [phone]  Follow Up Time: 1 week    Charlene Olea)  Infectious Disease  [address] [place] [address]  Phone: [phone]  Fax: [phone]  Follow Up Time: 2 weeks   Statement Selected

## 2025-03-10 NOTE — DISCHARGE NOTE PROVIDER - CARE PROVIDERS DIRECT ADDRESSES
,[email or web address]

## 2025-03-10 NOTE — PHARMACOTHERAPY INTERVENTION NOTE - NSPHARMCOMMASP
ASP - Therapy recommended/ Alternative therapy
ASP - Dose optimization/Non-Renal dose adjustment
ASP - Therapy recommended/ Alternative therapy

## 2025-03-10 NOTE — DISCHARGE NOTE PROVIDER - PROVIDER TOKENS
PROVIDER:[TOKEN:[[id number]],FOLLOWUP:[1 week]] PROVIDER:[TOKEN:[[id number]],FOLLOWUP:[1 week]],PROVIDER:[TOKEN:[[id number]],FOLLOWUP:[2 weeks]]

## 2025-03-10 NOTE — DISCHARGE NOTE PROVIDER - NSDCFUADDAPPT_GEN_ALL_CORE_FT
APPTS ARE READY TO BE MADE: [X] YES    Best Family or Patient Contact (if needed):    Additional Information about above appointments (if needed):    1:   2:   3:     Other comments or requests:    APPTS ARE READY TO BE MADE: [X] YES    Best Family or Patient Contact (if needed):    Additional Information about above appointments (if needed):    1:   2:   3:     Other comments or requests:   Prior to outreaching the patient, it was visible that the patient has secured a follow up appointment which was not scheduled by our team. Patient is scheduled 3/14 at 2:15PM at [address] [place] [address] with Dr. Caitie Haas     Appointment was scheduled by our team on the patient's behalf through the provider's office. Appointment is on 9:30AM on 5/1 at [address] [place] [address], Entrance 5 with Dr. Charlene Olea

## 2025-03-10 NOTE — DISCHARGE NOTE PROVIDER - NSDCCPCAREPLAN_GEN_ALL_CORE_FT
PRINCIPAL DISCHARGE DIAGNOSIS  Diagnosis: Abdominal pain  Assessment and Plan of Treatment: Ct with 12.3 x 1.6 x 9.4 cm fluid collection in the anterior pelvic wall increased compared to 12/31/2024.   s/p Repeat drain and 2- 3 ccs of serous fluid aspirated.  Plastic planning for eventual elective seroma evacuation outpatient. Patient treated with keflex for 2 weeks. Outpatietn follow up with Plastics for evacuation        PRINCIPAL DISCHARGE DIAGNOSIS  Diagnosis: Abdominal pain  Assessment and Plan of Treatment: Ct with 12.3 x 1.6 x 9.4 cm fluid collection in the anterior pelvic wall increased compared to 12/31/2024.   s/p Repeat drain and 2- 3 ccs of serous fluid aspirated.  Plastic planning for eventual elective seroma evacuation outpatient. Patient treated with antibiotics and to continue outpatient to complete  for 2 weeks. Outpatient follow up with Plastics for evacuation .       PRINCIPAL DISCHARGE DIAGNOSIS  Diagnosis: Abdominal pain  Assessment and Plan of Treatment: Ct with 12.3 x 1.6 x 9.4 cm fluid collection in the anterior pelvic wall increased compared to 12/31/2024.   s/p Repeat drain and 2- 3 ccs of serous fluid aspirated.  Plastic planning for eventual elective seroma evacuation outpatient. Patient treated with antibiotics and to continue outpatient to complete  for 2 weeks. Outpatient follow up with Plastics for evacuation .        SECONDARY DISCHARGE DIAGNOSES  Diagnosis: Abdominal wall seroma  Assessment and Plan of Treatment: -Drain functioning well.   -Continue to monitor and record drain output  -Patient planned for outpatient seroma evacuation with plastics.   -flush drain with 5cc NS daily forward only; DO NOT aspirate  -change dressing q3 days or when dressing is saturated  -

## 2025-03-10 NOTE — DISCHARGE NOTE PROVIDER - EXTENDED VTE YES NO FOR MLM ENOXAPARIN
[>50% of Time Spent on Counseling and Coordination of Care for  ___] : Greater than 50% of the encounter time was spent on counseling and coordination of care for [unfilled] ,

## 2025-03-10 NOTE — DISCHARGE NOTE PROVIDER - NSDCFUSCHEDAPPT_GEN_ALL_CORE_FT
Shikowitz-Behr, Lauren B  [facility]  Scheduled Appointment: 03/12/2025    Shikowitz-Behr, Lauren B  [facility] Physician [facility]  [address]  Scheduled Appointment: 03/20/2025    Shikowitz-Behr, Lauren B  [facility]  Scheduled Appointment: 03/20/2025     Shikowitz-Behr, Lauren B  [facility]  [address] [facility]  Scheduled Appointment: 04/08/2025    Prem Love  [facility]ician [facility]  [address]   Scheduled Appointment: 06/23/2025

## 2025-03-10 NOTE — DISCHARGE NOTE PROVIDER - ATTENDING DISCHARGE PHYSICAL EXAMINATION:
seen and examined at bedside  drain evaluated by IR - dianne  I discussed case with Dr Haas - she will follow up with pt this week for further mgmt of infected seroma  c/w keflex x 2 weeks  exam unremarkable, ab exam and drain unchanged, pt is stable (and eager) for dc today

## 2025-03-10 NOTE — PROGRESS NOTE ADULT - PROBLEM SELECTOR PLAN 1
h/o ventral and umbilical hernia s/p repair w panniculectomy c/b abdominal wall seroma s/p drain placement 10/28/2024. since placement of drain, patient has required upsizing and repositioning of draining x1 on 1/29/25  currently w pain around drain site, also reporting some thick yellow debride   a/w leukocytosis; otherwise, afebrile and hds  imaging shows, "Right anterior approach drainage catheter with tip looped within a 12.3 x 1.6 x 9.4 cm fluid collection in the anterior pelvic wall....Collection is slightly increased compared to 12/31/2024"  monitor for fever, worsening white count  start empirical abx therapy w rocephin    IR tube check done - no issues.   Seen by plastic Sx - eventual plans for evacuation in OR. unclear timing .  ID eval for ?infected fluid/area and  duration of treatment
#infected seroma vs abscess 2/2 MSSA    h/o ventral and umbilical hernia s/p repair w panniculectomy c/b abdominal wall seroma s/p drain placement 10/28/2024. since placement of drain, patient has required upsizing and repositioning of draining x1 on 1/29/25  currently w pain around drain site, also reporting some thick yellow debride   a/w leukocytosis; otherwise, afebrile and hds  imaging shows, "Right anterior approach drainage catheter with tip looped within a 12.3 x 1.6 x 9.4 cm fluid collection in the anterior pelvic wall....Collection is slightly increased compared to 12/31/2024"  monitor for fever, worsening white count  vanco changed to ancef - per ID can change to Keflex 1g Q8 to complete 2 weeks    per plastics plan is to follow up in the office for surgical planning for excision of abdominal wall seroma as an outpatient    pt has long history of drain clog - says it is clogged again - f/u IR
h/o ventral and umbilical hernia s/p repair w panniculectomy c/b abdominal wall seroma s/p drain placement 10/28/2024. since placement of drain, patient has required upsizing and repositioning of draining x1 on 1/29/25  currently w pain around drain site, also reporting some thick yellow debride   a/w leukocytosis; otherwise, afebrile and hds  imaging shows, "Right anterior approach drainage catheter with tip looped within a 12.3 x 1.6 x 9.4 cm fluid collection in the anterior pelvic wall....Collection is slightly increased compared to 12/31/2024"  monitor for fever, worsening white count  C/w Vancomycin    IR tube check done - no issues.   Seen by plastic Sx - to follow up in the office for surgical planning for excision of abdominal wall seroma as an outpatient  Await cultures and tailor abx as needed  ID following
h/o ventral and umbilical hernia s/p repair w panniculectomy c/b abdominal wall seroma s/p drain placement 10/28/2024. since placement of drain, patient has required upsizing and repositioning of draining x1 on 1/29/25  currently w pain around drain site, also reporting some thick yellow debride   a/w leukocytosis; otherwise, afebrile and hds  imaging shows, "Right anterior approach drainage catheter with tip looped within a 12.3 x 1.6 x 9.4 cm fluid collection in the anterior pelvic wall....Collection is slightly increased compared to 12/31/2024"  monitor for fever, worsening white count  C/w Vancomycin    IR tube check done - no issues.   Seen by plastic Sx - to follow up in the office for surgical planning for excision of abdominal wall seroma as an outpatient  Await cultures and tailor abx as needed  ID following

## 2025-03-10 NOTE — PROGRESS NOTE ADULT - PROBLEM SELECTOR PLAN 2
UA + LE and WBC.   patient currently denies any symptoms   Ucx not sent in ED prior to abx   c/w Vancomycin   will monitor
UA + LE and WBC.   patient currently denies any symptoms   Ucx not sent in ED prior to abx   c/w Vancomycin   will monitor
UA + LE and WBC.   patient currently denies any symptoms   Ucx not sent in ED prior to abx   pt is currently on CTx for above.   will monitor
UA + LE and WBC.   patient currently denies any symptoms   Ucx not sent in ED prior to abx   c/w Vancomycin   will monitor

## 2025-03-11 ENCOUNTER — TRANSCRIPTION ENCOUNTER (OUTPATIENT)
Age: 50
End: 2025-03-11

## 2025-03-11 VITALS
RESPIRATION RATE: 18 BRPM | SYSTOLIC BLOOD PRESSURE: 111 MMHG | TEMPERATURE: 98 F | DIASTOLIC BLOOD PRESSURE: 84 MMHG | OXYGEN SATURATION: 97 % | HEART RATE: 86 BPM

## 2025-03-11 LAB
CULTURE RESULTS: ABNORMAL
SPECIMEN SOURCE: SIGNIFICANT CHANGE UP

## 2025-03-11 PROCEDURE — 76080 X-RAY EXAM OF FISTULA: CPT

## 2025-03-11 PROCEDURE — 74177 CT ABD & PELVIS W/CONTRAST: CPT | Mod: MC

## 2025-03-11 PROCEDURE — 36415 COLL VENOUS BLD VENIPUNCTURE: CPT

## 2025-03-11 PROCEDURE — 96375 TX/PRO/DX INJ NEW DRUG ADDON: CPT

## 2025-03-11 PROCEDURE — 83735 ASSAY OF MAGNESIUM: CPT

## 2025-03-11 PROCEDURE — G0545: CPT

## 2025-03-11 PROCEDURE — 80061 LIPID PANEL: CPT

## 2025-03-11 PROCEDURE — 99232 SBSQ HOSP IP/OBS MODERATE 35: CPT | Mod: GC

## 2025-03-11 PROCEDURE — 83690 ASSAY OF LIPASE: CPT

## 2025-03-11 PROCEDURE — 49424 ASSESS CYST CONTRAST INJECT: CPT

## 2025-03-11 PROCEDURE — 87205 SMEAR GRAM STAIN: CPT

## 2025-03-11 PROCEDURE — 80202 ASSAY OF VANCOMYCIN: CPT

## 2025-03-11 PROCEDURE — 80048 BASIC METABOLIC PNL TOTAL CA: CPT

## 2025-03-11 PROCEDURE — 85730 THROMBOPLASTIN TIME PARTIAL: CPT

## 2025-03-11 PROCEDURE — 85610 PROTHROMBIN TIME: CPT

## 2025-03-11 PROCEDURE — 96374 THER/PROPH/DIAG INJ IV PUSH: CPT

## 2025-03-11 PROCEDURE — 87186 SC STD MICRODIL/AGAR DIL: CPT

## 2025-03-11 PROCEDURE — 87015 SPECIMEN INFECT AGNT CONCNTJ: CPT

## 2025-03-11 PROCEDURE — 83036 HEMOGLOBIN GLYCOSYLATED A1C: CPT

## 2025-03-11 PROCEDURE — 99239 HOSP IP/OBS DSCHRG MGMT >30: CPT

## 2025-03-11 PROCEDURE — 80053 COMPREHEN METABOLIC PANEL: CPT

## 2025-03-11 PROCEDURE — 85027 COMPLETE CBC AUTOMATED: CPT

## 2025-03-11 PROCEDURE — 87075 CULTR BACTERIA EXCEPT BLOOD: CPT

## 2025-03-11 PROCEDURE — 87070 CULTURE OTHR SPECIMN AEROBIC: CPT

## 2025-03-11 PROCEDURE — 87077 CULTURE AEROBIC IDENTIFY: CPT

## 2025-03-11 PROCEDURE — 85025 COMPLETE CBC W/AUTO DIFF WBC: CPT

## 2025-03-11 PROCEDURE — 81001 URINALYSIS AUTO W/SCOPE: CPT

## 2025-03-11 PROCEDURE — 99285 EMERGENCY DEPT VISIT HI MDM: CPT | Mod: 25

## 2025-03-11 RX ORDER — CEPHALEXIN 250 MG/1
2 CAPSULE ORAL
Qty: 78 | Refills: 0
Start: 2025-03-11 | End: 2025-03-23

## 2025-03-11 RX ADMIN — Medication 100 MILLIGRAM(S): at 14:26

## 2025-03-11 RX ADMIN — Medication 100 MILLIGRAM(S): at 05:03

## 2025-03-11 RX ADMIN — Medication 1 APPLICATION(S): at 14:27

## 2025-03-11 RX ADMIN — ENOXAPARIN SODIUM 40 MILLIGRAM(S): 100 INJECTION SUBCUTANEOUS at 05:03

## 2025-03-11 NOTE — PROGRESS NOTE ADULT - PROVIDER SPECIALTY LIST ADULT
Intervent Radiology
Plastic Surgery
Infectious Disease
Plastic Surgery
Internal Medicine
Infectious Disease
Plastic Surgery
Hospitalist

## 2025-03-11 NOTE — DISCHARGE NOTE NURSING/CASE MANAGEMENT/SOCIAL WORK - FINANCIAL ASSISTANCE
[facility] provides services at a reduced cost to those who are determined to be eligible through [facility]’s financial assistance program. Information regarding [facility]’s financial assistance program can be found by going to [email or web address] or by calling [phone].

## 2025-03-11 NOTE — PROGRESS NOTE ADULT - ASSESSMENT
48yo f w pmh obesity, carlo, htn, hld, ventral and umbilical hernia s/p repair w panniculectomy c/b abdominal wall seroma s/p drain placement, p/w abdominal pain; in er, c/f worsening seroma w malfunctioning drain; admit to medicine for further mgmt
Patient with abdominal wall seroma, being managed with IR drain as an outpatient. Admitted with abdominal pain. Drain check performed by IR today, CT scan reviewed, cultures obtained.     Patient is well appearing.     There is no need for urgent surgical intervention on this hospital admission  Continue drain  Await cultures and tailor abx as needed  Patient to follow up in the office for surgical planning for excision of abdominal wall seroma as an outpatient  Patient expressed understanding and agrees with this plan  please call with questions or concerns [phone]
RAFAEL SHARAN 49F PMH obesity, NICOLÁS, HTN, HLD, ventral and umbilical hernia s/p repair w panniculectomy 10/28/24 c/b abdominal wall seroma s/p drain placement by IR multiple times most recently 02/28, p/w 2 days of abdominal pain and increased drain output. Presented to IR clinic and sent to ED for increased abdominal pain, CTAP with drain appropriately placed and slight interval increase in abdominal wall collection compared to 12/2024. Patient noticed thick yellow fluid in drain tubing yesterday, output otherwise serous. Denies purulent/milky drain output. Afebrile with WBC 16.9 in the ED. Plastic surgery consulted for management of seroma. Plan for elective RTOR for seroma evacuation.    Plan  - Stable for DC on PO abx; plan for eventual elective seroma evacuation in OR as outpt  - sp drainage by IR  - Abx: Vancomycin  - Cx: few staph aureus  - Regular diet   - Pain control  - Activity as tolerated; encourage ambulation  - Continue DVT prophylaxis  - Remainder of care per primary team       Giovanny Plascencia PGY-1  Plastic Surgery  Teams preferred for non-urgent queries  [facility]: [id number]  [facility]: [phone] 
49 f with obesity, NICOLÁS,  HTN, HLD, ventral and umbilical hernia s/p repair w panniculectomy on 10/28/2024 complicated by abdominal wall seroma s/p drain placement in 12/2024, most recent drain check on 2/28 with IR showing clogged drain with large superior collection superior to drain and drain holes clogged with fibrinous material requiring new 14 [place] drain placement and decompression of collection, sent in by IR for evaluation after she presented for repeat drain check today but was complaining of diffuse abdominal pain x2 days associated with nausea, increased drain output, and change in drain output.   afebrile, WBC: 16  Ct with 12.3 x 1.6 x 9.4 cm fluid collection in the anterior pelvic wall increased compared to 12/31/2024.   s/p Repeat drain and 2- 3 ccs of serous fluid aspirated.  Plastic planning for eventual elective seroma evacuation    s/p hernia repair and panniculectomy 10/2024 and seroma since s/p IR drain 12/2024 but all tube checks still showed the cavity so never removed, had clogged tube with fibrinous material 2/28 s/p exchange now with abd pain and leukocytosis, CT with increased size of the collection, IR drainage showed MSSA  now IR drain is not draining much and has draiange from the umbilicus, plastic is planning for seroma evacuation electively    * switch vanco to cefazolin 2 q 8  * IR tube check  * will complete a 2 week course, on discharge switch to PO keflex 1 q 8 (in view of the weight)  * follow plastic for surgical intervention       The above assessment and plan was discussed with the primary team    Charlene Olea MD  contact on teams  After 5pm and on weekends call [phone]
RAFAEL MAGALLONDANNA 49F PMH obesity, NICOLÁS, HTN, HLD, ventral and umbilical hernia s/p repair w panniculectomy 10/28/24 c/b abdominal wall seroma s/p drain placement by IR multiple times most recently 02/28, p/w 2 days of abdominal pain and increased drain output. Presented to IR clinic and sent to ED for increased abdominal pain, CTAP with drain appropriately placed and slight interval increase in abdominal wall collection compared to 12/2024. Patient noticed thick yellow fluid in drain tubing yesterday, output otherwise serous. Denies purulent/milky drain output. Afebrile with WBC 16.9 in the ED. Plastic surgery consulted for management of seroma. Plan for elective RTOR for seroma evacuation.    - Plan for eventual elective seroma evacuation in OR as outpt  - sp drainage by IR  - Trend WBC  - Continue CTX  - Regular diet   - Pain control  - Activity as tolerated; encourage ambulation  - Continue DVT prophylaxis    Plastic Surgery   [facility]: [id number]  [facility]: [phone]
49 f with obesity, NICOLÁS,  HTN, HLD, ventral and umbilical hernia s/p repair w panniculectomy on 10/28/2024 complicated by abdominal wall seroma s/p drain placement in 12/2024, most recent drain check on 2/28 with IR showing clogged drain with large superior collection superior to drain and drain holes clogged with fibrinous material requiring new 14 [place]har[place] drain placement and decompression of collection, sent in by IR for evaluation after she presented for repeat drain check today but was complaining of diffuse abdominal pain x2 days associated with nausea, increased drain output, and change in drain output.   afebrile, WBC: 16  Ct with 12.3 x 1.6 x 9.4 cm fluid collection in the anterior pelvic wall increased compared to 12/31/2024.   s/p Repeat drain and 2- 3 ccs of serous fluid aspirated.  Plastic planning for eventual elective seroma evacuation    s/p hernia repair and panniculectomy 10/2024 and seroma since s/p IR drain 12/2024 but all tube checks still showed the cavity so never removed, had clogged tube with fibrinous material 2/28 s/p exchange now with abd pain and leukocytosis, CT with increased size of the collection, IR drainage showed MSSA  now IR drain is not draining much and has draiange from the umbilicus, plastic is planning for seroma evacuation electively    * c/w  cefazolin 2 q 8 while in the hospital  * will complete a 2 week course, on discharge switch to PO keflex 1 q 8 (in view of the weight)  * follow plastic for surgical intervention       The above assessment and plan was discussed with the primary team    Charlene Olea MD  contact on teams  
50yo f w pmh obesity, carlo, htn, hld, ventral and umbilical hernia s/p repair w panniculectomy c/b abdominal wall seroma s/p drain placement, p/w abdominal pain; in er, c/f worsening seroma w malfunctioning drain; admit to medicine for further mgmt
48yo f w pmh obesity, carlo, htn, hld, ventral and umbilical hernia s/p repair w panniculectomy c/b abdominal wall seroma s/p drain placement, p/w abdominal pain; in er, c/f worsening seroma w malfunctioning drain; admit to medicine for further mgmt
48yo f w pmh obesity, carlo, htn, hld, ventral and umbilical hernia s/p repair w panniculectomy c/b abdominal wall seroma s/p drain placement, p/w abdominal pain; in er, c/f worsening seroma w malfunctioning drain; admit to medicine for further mgmt

## 2025-03-11 NOTE — DISCHARGE NOTE NURSING/CASE MANAGEMENT/SOCIAL WORK - PATIENT PORTAL LINK FT
You can access the FollowMyHealth Patient Portal offered by [facility] by registering at the following website: [email or web address]. By joining [facility]’s FollowMyHealth portal, you will also be able to view your health information using other applications (apps) compatible with our system.

## 2025-03-11 NOTE — DISCHARGE NOTE NURSING/CASE MANAGEMENT/SOCIAL WORK - NSDCPEFALRISK_GEN_ALL_CORE
For information on Fall & Injury Prevention, visit: [email or web address] OR  [email or web address] OR  https://www.cdc.gov/steadi/patient.html

## 2025-03-11 NOTE — PROGRESS NOTE ADULT - SUBJECTIVE AND OBJECTIVE BOX
Follow Up:  abd collection    Interval History/ROS: the IR cx showed MSSA, pt afebrile but has drainage from the umbilicus and decreased output from the drain          Allergies  [This allergen will not trigger allergy alert] COLD urticaria - has epi pen (Rash; Urticaria)  adhesives (Rash)  No Known Drug Allergies        ANTIMICROBIALS:  ceFAZolin   IVPB 2000 every 8 hours      OTHER MEDS:  acetaminophen     Tablet .. 650 milliGRAM(s) Oral every 6 hours PRN  aluminum hydroxide/magnesium hydroxide/simethicone Suspension 30 milliLiter(s) Oral every 4 hours PRN  bisacodyl 5 milliGRAM(s) Oral daily PRN  chlorhexidine 2% Cloths 1 Application(s) Topical daily  enoxaparin Injectable 40 milliGRAM(s) SubCutaneous every 24 hours  melatonin 3 milliGRAM(s) Oral at bedtime PRN  naloxone Injectable 0.4 milliGRAM(s) IV Push once  ondansetron Injectable 4 milliGRAM(s) IV Push every 8 hours PRN  oxyCODONE    IR 5 milliGRAM(s) Oral every 4 hours PRN  oxyCODONE    IR 10 milliGRAM(s) Oral every 4 hours PRN  polyethylene glycol 3350 17 Gram(s) Oral daily  senna 2 Tablet(s) Oral at bedtime  sodium chloride 0.9%. 1000 milliLiter(s) IV Continuous <Continuous>      Vital Signs Last 24 Hrs  T(C): 36.7 (10 Mar 2025 13:10), Max: 36.7 (10 Mar 2025 05:16)  T(F): 98 (10 Mar 2025 13:10), Max: 98 (10 Mar 2025 05:16)  HR: 69 (10 Mar 2025 05:16) (69 - 69)  BP: 146/76 (10 Mar 2025 13:10) (118/76 - 146/76)  BP(mean): --  RR: 18 (10 Mar 2025 13:10) (18 - 18)  SpO2: 96% (10 Mar 2025 13:10) (96% - 97%)    Parameters below as of 10 Mar 2025 13:10  Patient On (Oxygen Delivery Method): room air        Physical Exam:  General:    NAD,  non toxic, sitting on a chair  Respiratory:    comfortable on RA  abd:     soft,   some erythema in the umbilicus with slight drainage IR drainage with cloudy yellow drainage  :   no  sanford  Musculoskeletal:   no joint swelling  vascular: no phlebitis  Skin:    no rash                          12.6   11.33 )-----------( 409      ( 10 Mar 2025 10:00 )             41.5       03-10    138  |  99  |  10  ----------------------------<  96  4.1   |  25  |  0.62    Ca    9.9      10 Mar 2025 09:59    TPro  7.0  /  Alb  3.5  /  TBili  0.2  /  DBili  x   /  AST  12  /  ALT  16  /  AlkPhos  86  03-09      Urinalysis Basic - ( 10 Mar 2025 09:59 )    Color: x / Appearance: x / SG: x / pH: x  Gluc: 96 mg/dL / Ketone: x  / Bili: x / Urobili: x   Blood: x / Protein: x / Nitrite: x   Leuk Esterase: x / RBC: x / WBC x   Sq Epi: x / Non Sq Epi: x / Bacteria: x        MICROBIOLOGY:  v  Body Fluid Abdominal Seroma Drain  03-07-25   Few Staphylococcus aureus  --  Staphylococcus aureus                RADIOLOGY:  Images independently visualized and reviewed personally, findings as below  On initial aspiration 2-3 cc serous fluid able to be aspirated for culture. Hand injection of contrast into existing abdominal drain demonstrated pigtail central within moderate sized amorphic cavity. Collection was smaller compared to prior drain study. Drain was not clogged. Collection was decompressed. Drain flushed, left in place and reconnected to gravity bag with new DSD.  < from: CT Abdomen and Pelvis w/ IV Cont (03.06.25 @ 15:58) >  IMPRESSION:  Chronic abdominal wall fluid collection slightly increased in size.    < end of copied text >  
[facility] Division of Hospital Medicine  Gurwinder Chatman MD  Contact [facility], 8A-5P through [employer] Teams  Other times, contact Hospitalist on call    Patient is a 49y old  Female who presents with a chief complaint of abdominal pain (10 Mar 2025 07:48)      SUBJECTIVE / OVERNIGHT EVENTS: c/o pain, swelling in abdomen, feeling discrete masses. she notes continued drainage from umbilicus  ADDITIONAL REVIEW OF SYSTEMS:    MEDICATIONS  (STANDING):  ceFAZolin   IVPB 2000 milliGRAM(s) IV Intermittent every 8 hours  chlorhexidine 2% Cloths 1 Application(s) Topical daily  enoxaparin Injectable 40 milliGRAM(s) SubCutaneous every 24 hours  naloxone Injectable 0.4 milliGRAM(s) IV Push once  polyethylene glycol 3350 17 Gram(s) Oral daily  senna 2 Tablet(s) Oral at bedtime  sodium chloride 0.9%. 1000 milliLiter(s) (100 mL/Hr) IV Continuous <Continuous>    MEDICATIONS  (PRN):  acetaminophen     Tablet .. 650 milliGRAM(s) Oral every 6 hours PRN Temp greater or equal to 38C (100.4F), Mild Pain (1 - 3), Moderate Pain (4 - 6)  aluminum hydroxide/magnesium hydroxide/simethicone Suspension 30 milliLiter(s) Oral every 4 hours PRN Dyspepsia  bisacodyl 5 milliGRAM(s) Oral daily PRN Constipation  melatonin 3 milliGRAM(s) Oral at bedtime PRN Insomnia  ondansetron Injectable 4 milliGRAM(s) IV Push every 8 hours PRN Nausea and/or Vomiting  oxyCODONE    IR 5 milliGRAM(s) Oral every 4 hours PRN Moderate Pain (4 - 6)  oxyCODONE    IR 10 milliGRAM(s) Oral every 4 hours PRN Severe Pain (7 - 10)      CAPILLARY BLOOD GLUCOSE        I&O's Summary    09 Mar 2025 07:01  -  10 Mar 2025 07:00  --------------------------------------------------------  IN: 500 mL / OUT: 0 mL / NET: 500 mL        PHYSICAL EXAM:  Vital Signs Last 24 Hrs  T(C): 36.7 (10 Mar 2025 05:16), Max: 37.4 (09 Mar 2025 16:19)  T(F): 98 (10 Mar 2025 05:16), Max: 99.4 (09 Mar 2025 16:19)  HR: 69 (10 Mar 2025 05:16) (69 - 82)  BP: 118/76 (10 Mar 2025 05:16) (118/76 - 125/79)  BP(mean): --  RR: 18 (10 Mar 2025 05:16) (18 - 18)  SpO2: 97% (10 Mar 2025 05:16) (97% - 97%)    Parameters below as of 10 Mar 2025 05:16  Patient On (Oxygen Delivery Method): room air      GENERAL: NAD  HEENT:  anicteric, moist mucous membranes  CHEST/LUNG:  CTA b/l, no rales, wheezes, or rhonchi  HEART:  RRR, S1, S2  ABDOMEN:  BS+, soft, nontender, nondistended, Umbilical and transverse abdominal incisions well healed, some drainage at umbilicus, IR drain in place  EXTREMITIES: no edema, cyanosis, or calf tenderness  NERVOUS SYSTEM: answers questions and follows commands appropriately  LABS:                        12.6   11.33 )-----------( 409      ( 10 Mar 2025 10:00 )             41.5     03-10    138  |  99  |  10  ----------------------------<  96  4.1   |  25  |  0.62    Ca    9.9      10 Mar 2025 09:59    TPro  7.0  /  Alb  3.5  /  TBili  0.2  /  DBili  x   /  AST  12  /  ALT  16  /  AlkPhos  86  03-09          Urinalysis Basic - ( 10 Mar 2025 09:59 )    Color: x / Appearance: x / SG: x / pH: x  Gluc: 96 mg/dL / Ketone: x  / Bili: x / Urobili: x   Blood: x / Protein: x / Nitrite: x   Leuk Esterase: x / RBC: x / WBC x   Sq Epi: x / Non Sq Epi: x / Bacteria: x        Culture - Body Fluid with Gram Stain (collected 07 Mar 2025 16:15)  Source: Body Fluid Abdominal Seroma Drain  Gram Stain (08 Mar 2025 19:17):    No polymorphonuclear leukocytes per low power field    No organisms seen per oil power field  Preliminary Report (08 Mar 2025 19:17):    Few Staphylococcus aureus  Organism: Staphylococcus aureus (09 Mar 2025 15:10)  Organism: Staphylococcus aureus (09 Mar 2025 15:10)      RADIOLOGY & ADDITIONAL TESTS:  Results Reviewed:   Imaging Personally Reviewed:  Electrocardiogram Personally Reviewed:    COORDINATION OF CARE:  Care Discussed with Consultants/Other Providers [Y/N]: platnani Haas & ID Dr Olea re plan of care, IR   Prior or Outpatient Records Reviewed [Y/N]:  
Interventional Radiology Follow-Up Note    49-year-old female with a PMH Morbid obesity, HLD, HTN, s/p panniculectomy and ventral/umbilical hernia repair 10/28/24 c/b abdominal wall fluid collection s/p drainage at [facility] 24. Last appointment, pt underwent upsizing of drain from 12 Fr to 14 Fr. Most recently drain was evaluated and exchanged on  due to drain being clogged. Patient then presented to IR yesterday 3/6 for severe abdominal pain generalize across the lower abdomen for 2 days and patient was sent to the ER for infectious workup. CT confirms drain position within collection however CT demonstrated collection slightly larger since last CT 24.  Patient is s/p abdominal wall drain check on 3/7. IR contacted regarding concern for clogged drain.     S: Patient seen and examined @ bedside. Patient reports concern for drain being clogged as output not as high as before.     Medication:     ceFAZolin   IVPB: (03-10)  enoxaparin Injectable: (03-10)  vancomycin  IVPB: ()  vancomycin  IVPB: (03-10)    Vitals:   T(F): 98.7, Max: 98.7 (16:28)  HR: 85  BP: 124/81  RR: 18  SpO2: 96%    Physical Exam:  General: Nontoxic, in NAD, A&O x3.  Abdomen: soft, NTND, no peritoneal signs.  Drain Device: Drain intact attached to bag with slightly cloudy yellow output ~70. Dressing clean, dry, intact. Drain flushed with 5cc NS w/o issues. Tubing stripped. +fluid return noted in tubing. no pericatheter leakage.     LABS:  WBC 11.33 / Hgb 12.6 / Hct 41.5 / Plt 409  Na -- / K -- / CO2 -- / Cl -- / BUN -- / Cr -- / Glucose --  ALT -- / AST -- / Alk Phos -- / Tbili --  Ptt -- / Pt -- / INR --      Assessment/Plan: 49-year-old female with a PMH Morbid obesity, HLD, HTN, s/p panniculectomy and ventral/umbilical hernia repair 10/28/24 c/b abdominal wall fluid collection s/p drainage at [facility] 24. Last appointment, pt underwent upsizing of drain from 12 Fr to 14 Fr. Most recently drain was evaluated and exchanged on  due to drain being clogged. Patient then presented to IR yesterday 3/6 for severe abdominal pain generalize across the lower abdomen for 2 days and patient was sent to the ER for infectious workup. CT confirms drain position within collection however CT demonstrated collection slightly larger since last CT 24.  Patient is s/p abdominal wall drain check on 3/7. IR contacted regarding concern for clogged drain.     -Drain functioning well.   -Continue to monitor and record drain output  -Patient planned for outpatient seroma evacuation with plastics.   -trend vs/labs  -flush drain with 5cc NS daily forward only; DO NOT aspirate  -change dressing q3 days or when dressing is saturated  - Greater than 50% of the encounter was spent counseling and/or coordination of care on drain management and follow up.   -continue global management per primary team       Any questions or concerns regarding above please reach out to IR:   -Available on [employer] teams  -During working hours (7a-5p): call -[phone]  -Emergent issues after 5pm: page: [phone]  -Non-emergent consults: Please place a [place] order "IR Consult" with an appropriate callback number  -Scheduling questions: [phone]  -Clinic/Outpatient [phone]      Erin Jonas PA-C  Available on teams    
Patient seen and examined at bedside. She is in better spirits this evening, feeling hungry.     Patient is very well known to me. She has suffered from abdominal wall seroma after removal of 25 lbs abdominal pannus. IR drain has been clogged/replaced on more than one occasion and her progress has been slow. Patient has been hesitant to commit to surgery for excision of abdominal wall seroma, but was coming to my office today to discuss this option further.     Patient called me on Wednesday evening stating that she had been to IR expecting to finally have the drain removed, but instead was told it was clogged and therefore had it removed and upsized. She stated that she started to experience abdominal pain at that time.  I instructed her to start an oral antibiotic and call IR in the AM for follow up, and to see me in the office on Friday to discuss surgical options.     Late last night I was made aware that Sybil was send from IR to the ED, and admitted with elevated WBC and abdominal pain. She was also concerned that there was "pus" in her drain, however, upon reviewing the patient's photograph the substance was more likely tissue and not purulent material.     She now reports that she has continued abdominal discomfort, spanning across the whole lower abdomen.       Vital Signs Last 24 Hrs  T(C): 36.4 (07 Mar 2025 12:29), Max: 37.8 (06 Mar 2025 20:29)  T(F): 97.6 (07 Mar 2025 12:29), Max: 100 (06 Mar 2025 20:29)  HR: 101 (07 Mar 2025 12:29) (85 - 101)  BP: 122/69 (07 Mar 2025 12:29) (117/68 - 135/71)  BP(mean): --  RR: 18 (07 Mar 2025 12:29) (18 - 18)  SpO2: 98% (07 Mar 2025 12:29) (97% - 98%)    Parameters below as of 07 Mar 2025 12:01  Patient On (Oxygen Delivery Method): room air                          11.9   12.78 )-----------( 252      ( 07 Mar 2025 06:42 )             39.3     Exam:  Abdomen is soft, mildly tender to palpation  Drain insertion site is C/D/I
Plastic Surgery Progress Note (pg [facility]: [id number], [facility]: [phone])    SUBJECTIVE  The patient was seen and examined. No acute events overnight. Pain controlled, afebrile w/ stable vitals.     OBJECTIVE  ___________________________________________________  VITAL SIGNS / I&O's   Vital Signs Last 24 Hrs  T(C): 36.7 (10 Mar 2025 05:16), Max: 37.4 (09 Mar 2025 16:19)  T(F): 98 (10 Mar 2025 05:16), Max: 99.4 (09 Mar 2025 16:19)  HR: 69 (10 Mar 2025 05:16) (69 - 82)  BP: 118/76 (10 Mar 2025 05:16) (118/76 - 125/79)  BP(mean): --  RR: 18 (10 Mar 2025 05:16) (18 - 18)  SpO2: 97% (10 Mar 2025 05:16) (97% - 99%)    Parameters below as of 10 Mar 2025 05:16  Patient On (Oxygen Delivery Method): room air          09 Mar 2025 07:01  -  10 Mar 2025 07:00  --------------------------------------------------------  IN:    IV PiggyBack: 500 mL  Total IN: 500 mL    OUT:  Total OUT: 0 mL    Total NET: 500 mL        ___________________________________________________  PHYSICAL EXAM    General: Well developed, well nourished, NAD  Neuro: Alert and oriented, no focal deficits, moves all extremities spontaneously  HEENT: NCAT  Respiratory: Airway patent, respirations unlabored  Abdomen: Soft. Umbilical and transverse abdominal incisions well healed. IR drain in place without leakage or erythema at drain site, serous output in bag. umbilicus with malodorous thick seroma fluid.  Extremities: No edema, sensation and movement grossly intact  Skin: Warm, dry, appropriate color        ___________________________________________________  LABS                        11.0   10.40 )-----------( 308      ( 09 Mar 2025 08:59 )             36.1     09 Mar 2025 08:59    140    |  102    |  10     ----------------------------<  93     4.2     |  25     |  0.79     Ca    9.5        09 Mar 2025 08:59    TPro  7.0    /  Alb  3.5    /  TBili  0.2    /  DBili  x      /  AST  12     /  ALT  16     /  AlkPhos  86     09 Mar 2025 08:59      CAPILLARY BLOOD GLUCOSE            Urinalysis Basic - ( 09 Mar 2025 08:59 )    Color: x / Appearance: x / SG: x / pH: x  Gluc: 93 mg/dL / Ketone: x  / Bili: x / Urobili: x   Blood: x / Protein: x / Nitrite: x   Leuk Esterase: x / RBC: x / WBC x   Sq Epi: x / Non Sq Epi: x / Bacteria: x      ___________________________________________________  MICRO  Recent Cultures:  Specimen Source: Body Fluid Abdominal Seroma Drain, 03-07 @ 16:15; Results   Few Staphylococcus aureus[!]; Gram Stain:   No polymorphonuclear leukocytes per low power field  No organisms seen per oil power field[!]; Organism: Staphylococcus aureus[!]    ___________________________________________________  MEDICATIONS  (STANDING):  chlorhexidine 2% Cloths 1 Application(s) Topical daily  enoxaparin Injectable 40 milliGRAM(s) SubCutaneous every 24 hours  naloxone Injectable 0.4 milliGRAM(s) IV Push once  polyethylene glycol 3350 17 Gram(s) Oral daily  senna 2 Tablet(s) Oral at bedtime  sodium chloride 0.9%. 1000 milliLiter(s) (100 mL/Hr) IV Continuous <Continuous>  vancomycin  IVPB 1500 milliGRAM(s) IV Intermittent every 8 hours    MEDICATIONS  (PRN):  acetaminophen     Tablet .. 650 milliGRAM(s) Oral every 6 hours PRN Temp greater or equal to 38C (100.4F), Mild Pain (1 - 3), Moderate Pain (4 - 6)  aluminum hydroxide/magnesium hydroxide/simethicone Suspension 30 milliLiter(s) Oral every 4 hours PRN Dyspepsia  bisacodyl 5 milliGRAM(s) Oral daily PRN Constipation  melatonin 3 milliGRAM(s) Oral at bedtime PRN Insomnia  ondansetron Injectable 4 milliGRAM(s) IV Push every 8 hours PRN Nausea and/or Vomiting  oxyCODONE    IR 5 milliGRAM(s) Oral every 4 hours PRN Moderate Pain (4 - 6)  oxyCODONE    IR 10 milliGRAM(s) Oral every 4 hours PRN Severe Pain (7 - 10)  
  Follow Up:  abd collection    Interval History/ROS:  pt afebrile no vomiting or SOB       Allergies  [This allergen will not trigger allergy alert] COLD urticaria - has epi pen (Rash; Urticaria)  adhesives (Rash)  No Known Drug Allergies        ANTIMICROBIALS:  ceFAZolin   IVPB 2000 every 8 hours      OTHER MEDS:  acetaminophen     Tablet .. 650 milliGRAM(s) Oral every 6 hours PRN  aluminum hydroxide/magnesium hydroxide/simethicone Suspension 30 milliLiter(s) Oral every 4 hours PRN  bisacodyl 5 milliGRAM(s) Oral daily PRN  chlorhexidine 2% Cloths 1 Application(s) Topical daily  enoxaparin Injectable 40 milliGRAM(s) SubCutaneous every 24 hours  melatonin 3 milliGRAM(s) Oral at bedtime PRN  naloxone Injectable 0.4 milliGRAM(s) IV Push once  ondansetron Injectable 4 milliGRAM(s) IV Push every 8 hours PRN  oxyCODONE    IR 5 milliGRAM(s) Oral every 4 hours PRN  oxyCODONE    IR 10 milliGRAM(s) Oral every 4 hours PRN  polyethylene glycol 3350 17 Gram(s) Oral daily  senna 2 Tablet(s) Oral at bedtime  sodium chloride 0.9%. 1000 milliLiter(s) IV Continuous <Continuous>      Vital Signs Last 24 Hrs  T(C): 36.7 (11 Mar 2025 05:22), Max: 37.1 (10 Mar 2025 16:28)  T(F): 98.1 (11 Mar 2025 05:22), Max: 98.7 (10 Mar 2025 16:28)  HR: 79 (11 Mar 2025 05:22) (79 - 89)  BP: 118/78 (11 Mar 2025 05:22) (118/78 - 131/82)  BP(mean): --  RR: 18 (11 Mar 2025 05:22) (18 - 18)  SpO2: 96% (11 Mar 2025 05:22) (96% - 97%)    Parameters below as of 11 Mar 2025 05:22  Patient On (Oxygen Delivery Method): room air        Physical Exam:  General:    NAD,  non toxic, sitting on a chair  Respiratory:    comfortable on RA  abd:     soft,   drain with yellow cloudy drainage  :   no  sanford  Musculoskeletal:   no joint swelling  vascular: no phlebitis  Skin:    no rash                            12.6   11.33 )-----------( 409      ( 10 Mar 2025 10:00 )             41.5       03-10    138  |  99  |  10  ----------------------------<  96  4.1   |  25  |  0.62    Ca    9.9      10 Mar 2025 09:59        Urinalysis Basic - ( 10 Mar 2025 09:59 )    Color: x / Appearance: x / SG: x / pH: x  Gluc: 96 mg/dL / Ketone: x  / Bili: x / Urobili: x   Blood: x / Protein: x / Nitrite: x   Leuk Esterase: x / RBC: x / WBC x   Sq Epi: x / Non Sq Epi: x / Bacteria: x        MICROBIOLOGY:  v  Skin/Wound Skin/Wound  03-09-25   Rare Staphylococcus epidermidis  "Susceptibilities not performed"  --  --      Body Fluid Abdominal Seroma Drain  03-07-25   Few Staphylococcus aureus  --  Staphylococcus aureus                RADIOLOGY:  Images independently visualized and reviewed personally, findings as below  On initial aspiration 2-3 cc serous fluid able to be aspirated for culture. Hand injection of contrast into existing abdominal drain demonstrated pigtail central within moderate sized amorphic cavity. Collection was smaller compared to prior drain study. Drain was not clogged. Collection was decompressed. Drain flushed, left in place and reconnected to < from: CT Abdomen and Pelvis w/ IV Cont (03.06.25 @ 15:58) >  IMPRESSION:  Chronic abdominal wall fluid collection slightly increased in size.    < end of copied text >  
Plastic Surgery Progress Note (pg [facility]: [id number], [facility]: [phone])    SUBJECTIVE  The patient was seen and examined.     OBJECTIVE  ___________________________________________________  VITAL SIGNS / I&O's   Vital Signs Last 24 Hrs  T(C): 36.8 (08 Mar 2025 07:50), Max: 37.6 (07 Mar 2025 12:01)  T(F): 98.2 (08 Mar 2025 07:50), Max: 99.6 (07 Mar 2025 12:01)  HR: 85 (08 Mar 2025 07:50) (78 - 987)  BP: 120/84 (08 Mar 2025 07:50) (111/75 - 125/72)  BP(mean): --  RR: 18 (08 Mar 2025 07:50) (18 - 18)  SpO2: 99% (08 Mar 2025 07:50) (98% - 100%)    Parameters below as of 08 Mar 2025 07:50  Patient On (Oxygen Delivery Method): room air          ___________________________________________________  PHYSICAL EXAM      General: Well developed, well nourished, NAD  Neuro: Alert and oriented, no focal deficits, moves all extremities spontaneously  HEENT: NCAT  Respiratory: Airway patent, respirations unlabored  Abdomen: Soft, mildly tender RLQ. Umbilical and transverse abdominal incisions well healed. IR drain in place without leakage or erythema at drain site, serous output in bag.  Extremities: No edema, sensation and movement grossly intact  Skin: Warm, dry, appropriate color    ___________________________________________________  LABS                        11.9   12.78 )-----------( 252      ( 07 Mar 2025 06:42 )             39.3     07 Mar 2025 06:42    138    |  103    |  8      ----------------------------<  96     4.0     |  24     |  0.65     Ca    8.9        07 Mar 2025 06:42  Mg     2.1       06 Mar 2025 13:39    TPro  7.2    /  Alb  3.7    /  TBili  0.5    /  DBili  x      /  AST  9      /  ALT  13     /  AlkPhos  89     07 Mar 2025 06:42    PT/INR - ( 07 Mar 2025 06:42 )   PT: 13.3 sec;   INR: 1.17 ratio         PTT - ( 07 Mar 2025 06:42 )  PTT:30.1 sec  CAPILLARY BLOOD GLUCOSE            Urinalysis Basic - ( 07 Mar 2025 06:42 )    Color: x / Appearance: x / SG: x / pH: x  Gluc: 96 mg/dL / Ketone: x  / Bili: x / Urobili: x   Blood: x / Protein: x / Nitrite: x   Leuk Esterase: x / RBC: x / WBC x   Sq Epi: x / Non Sq Epi: x / Bacteria: x      ___________________________________________________  MICRO  Recent Cultures:  Specimen Source: Body Fluid Abdominal Seroma Drain, 03-07 @ 16:15; Results --; Gram Stain:   No polymorphonuclear leukocytes per low power field  No organisms seen per oil power field; Organism: --    ___________________________________________________  MEDICATIONS  (STANDING):  chlorhexidine 2% Cloths 1 Application(s) Topical daily  enoxaparin Injectable 40 milliGRAM(s) SubCutaneous every 24 hours  naloxone Injectable 0.4 milliGRAM(s) IV Push once  polyethylene glycol 3350 17 Gram(s) Oral daily  senna 2 Tablet(s) Oral at bedtime  sodium chloride 0.9%. 1000 milliLiter(s) (100 mL/Hr) IV Continuous <Continuous>  vancomycin  IVPB 1500 milliGRAM(s) IV Intermittent every 12 hours  vancomycin  IVPB        MEDICATIONS  (PRN):  acetaminophen     Tablet .. 650 milliGRAM(s) Oral every 6 hours PRN Temp greater or equal to 38C (100.4F), Mild Pain (1 - 3), Moderate Pain (4 - 6)  aluminum hydroxide/magnesium hydroxide/simethicone Suspension 30 milliLiter(s) Oral every 4 hours PRN Dyspepsia  bisacodyl 5 milliGRAM(s) Oral daily PRN Constipation  melatonin 3 milliGRAM(s) Oral at bedtime PRN Insomnia  ondansetron Injectable 4 milliGRAM(s) IV Push every 8 hours PRN Nausea and/or Vomiting  oxyCODONE    IR 5 milliGRAM(s) Oral every 4 hours PRN Moderate Pain (4 - 6)  oxyCODONE    IR 10 milliGRAM(s) Oral every 4 hours PRN Severe Pain (7 - 10)    
Yuniel Meza DO  Division of Hospital Medicine  Available on [employer] TEAMS    Patient is a 49y old  Female who presents with a chief complaint of abdominal pain (07 Mar 2025 18:34)      INTERVAL HPI/OVERNIGHT EVENTS: Patient seen and examined at bedside. No overnight events. Endorses abd distention and pain.     MEDICATIONS  (STANDING):  chlorhexidine 2% Cloths 1 Application(s) Topical daily  enoxaparin Injectable 40 milliGRAM(s) SubCutaneous every 24 hours  naloxone Injectable 0.4 milliGRAM(s) IV Push once  polyethylene glycol 3350 17 Gram(s) Oral daily  senna 2 Tablet(s) Oral at bedtime  sodium chloride 0.9%. 1000 milliLiter(s) (100 mL/Hr) IV Continuous <Continuous>  vancomycin  IVPB 1500 milliGRAM(s) IV Intermittent every 12 hours  vancomycin  IVPB        MEDICATIONS  (PRN):  acetaminophen     Tablet .. 650 milliGRAM(s) Oral every 6 hours PRN Temp greater or equal to 38C (100.4F), Mild Pain (1 - 3), Moderate Pain (4 - 6)  aluminum hydroxide/magnesium hydroxide/simethicone Suspension 30 milliLiter(s) Oral every 4 hours PRN Dyspepsia  bisacodyl 5 milliGRAM(s) Oral daily PRN Constipation  melatonin 3 milliGRAM(s) Oral at bedtime PRN Insomnia  ondansetron Injectable 4 milliGRAM(s) IV Push every 8 hours PRN Nausea and/or Vomiting  oxyCODONE    IR 5 milliGRAM(s) Oral every 4 hours PRN Moderate Pain (4 - 6)  oxyCODONE    IR 10 milliGRAM(s) Oral every 4 hours PRN Severe Pain (7 - 10)      Allergies    [This allergen will not trigger allergy alert] COLD urticaria - has epi pen (Rash; Urticaria)  adhesives (Rash)  No Known Drug Allergies    Intolerances    codeine (Pruritus)  Dramamine (Other)      REVIEW OF SYSTEMS:  All other review of systems is negative unless indicated above    Vital Signs Last 24 Hrs  T(C): 36.8 (08 Mar 2025 07:50), Max: 37.6 (07 Mar 2025 12:01)  T(F): 98.2 (08 Mar 2025 07:50), Max: 99.6 (07 Mar 2025 12:01)  HR: 85 (08 Mar 2025 07:50) (78 - 987)  BP: 120/84 (08 Mar 2025 07:50) (111/75 - 125/72)  BP(mean): --  RR: 18 (08 Mar 2025 07:50) (18 - 18)  SpO2: 99% (08 Mar 2025 07:50) (98% - 100%)    Parameters below as of 08 Mar 2025 07:50  Patient On (Oxygen Delivery Method): room air        PHYSICAL EXAM:  GENERAL: NAD  HEENT:  anicteric, moist mucous membranes  CHEST/LUNG:  CTA b/l, no rales, wheezes, or rhonchi  HEART:  RRR, S1, S2  ABDOMEN:  BS+, soft, nontender, nondistended  EXTREMITIES: no edema, cyanosis, or calf tenderness  NERVOUS SYSTEM: answers questions and follows commands appropriately    LABS:    CBC Full  -  ( 07 Mar 2025 06:42 )  WBC Count : 12.78 K/uL  Hemoglobin : 11.9 g/dL  Hematocrit : 39.3 %  Platelet Count - Automated : 252 K/uL  Mean Cell Volume : 85.6 fl  Mean Cell Hemoglobin : 25.9 pg  Mean Cell Hemoglobin Concentration : 30.3 g/dL  Auto Neutrophil # : x  Auto Lymphocyte # : x  Auto Monocyte # : x  Auto Eosinophil # : x  Auto Basophil # : x  Auto Neutrophil % : x  Auto Lymphocyte % : x  Auto Monocyte % : x  Auto Eosinophil % : x  Auto Basophil % : x      Ca    8.9        07 Mar 2025 06:42      PT/INR - ( 07 Mar 2025 06:42 )   PT: 13.3 sec;   INR: 1.17 ratio         PTT - ( 07 Mar 2025 06:42 )  PTT:30.1 sec  Urinalysis Basic - ( 07 Mar 2025 06:42 )    Color: x / Appearance: x / SG: x / pH: x  Gluc: 96 mg/dL / Ketone: x  / Bili: x / Urobili: x   Blood: x / Protein: x / Nitrite: x   Leuk Esterase: x / RBC: x / WBC x   Sq Epi: x / Non Sq Epi: x / Bacteria: x      CAPILLARY BLOOD GLUCOSE            Culture - Body Fluid with Gram Stain (collected 03-07-25 @ 16:15)  Source: Body Fluid Abdominal Seroma Drain  Gram Stain (03-08-25 @ 00:02):    No polymorphonuclear leukocytes per low power field    No organisms seen per oil power field        RADIOLOGY & ADDITIONAL TESTS:    Personally reviewed.     Consultant(s) Notes Reviewed:  [x] YES  [ ] NO    
[facility] Division of Hospital Medicine  Marilu Bejarano MD  Pager (M-F, 1D-5P): [phone]  Other Times:  [phone]    Patient is a 49y old  Female who presents with a chief complaint of abdominal pain (07 Mar 2025 06:43)      SUBJECTIVE / OVERNIGHT EVENTS:  c/o abd distention and pain. unchanged.     ADDITIONAL REVIEW OF SYSTEMS: otherwise neg    MEDICATIONS  (STANDING):  cefTRIAXone   IVPB 1000 milliGRAM(s) IV Intermittent every 24 hours  chlorhexidine 2% Cloths 1 Application(s) Topical daily  enoxaparin Injectable 40 milliGRAM(s) SubCutaneous every 24 hours  naloxone Injectable 0.4 milliGRAM(s) IV Push once  polyethylene glycol 3350 17 Gram(s) Oral daily  senna 2 Tablet(s) Oral at bedtime  sodium chloride 0.9%. 1000 milliLiter(s) (100 mL/Hr) IV Continuous <Continuous>    MEDICATIONS  (PRN):  acetaminophen     Tablet .. 650 milliGRAM(s) Oral every 6 hours PRN Temp greater or equal to 38C (100.4F), Mild Pain (1 - 3), Moderate Pain (4 - 6)  aluminum hydroxide/magnesium hydroxide/simethicone Suspension 30 milliLiter(s) Oral every 4 hours PRN Dyspepsia  bisacodyl 5 milliGRAM(s) Oral daily PRN Constipation  melatonin 3 milliGRAM(s) Oral at bedtime PRN Insomnia  ondansetron Injectable 4 milliGRAM(s) IV Push every 8 hours PRN Nausea and/or Vomiting  oxyCODONE    IR 5 milliGRAM(s) Oral every 4 hours PRN Moderate Pain (4 - 6)  oxyCODONE    IR 10 milliGRAM(s) Oral every 4 hours PRN Severe Pain (7 - 10)      CAPILLARY BLOOD GLUCOSE        I&O's Summary      PHYSICAL EXAM:  Vital Signs Last 24 Hrs  T(C): 36.4 (07 Mar 2025 12:29), Max: 37.8 (06 Mar 2025 20:29)  T(F): 97.6 (07 Mar 2025 12:29), Max: 100 (06 Mar 2025 20:29)  HR: 101 (07 Mar 2025 12:29) (85 - 101)  BP: 122/69 (07 Mar 2025 12:29) (104/64 - 135/71)  BP(mean): 77 (06 Mar 2025 16:23) (77 - 77)  RR: 18 (07 Mar 2025 12:29) (16 - 18)  SpO2: 98% (07 Mar 2025 12:29) (97% - 98%)    Parameters below as of 07 Mar 2025 12:01  Patient On (Oxygen Delivery Method): room air        CONSTITUTIONAL: NAD, obese  EYES:  conjunctiva and sclera clear  ENMT: Moist oral mucosa  NECK: Supple, no palpable masses; no JVD  RESPIRATORY: Normal respiratory effort; lungs are clear to auscultation bilaterally  CARDIOVASCULAR: Regular rate and rhythm, normal S1 and S2, no murmur/rub/gallop; No lower extremity edema  ABDOMEN: distended. right lower drain- light yellow outpt to bag. mild tenderness   MUSCULOSKELETAL:  no clubbing or cyanosis of digits; no joint swelling or tenderness to palpation  PSYCH: A+O to person, place, and time; affect appropriate  SKIN: No rashes; no palpable lesions    LABS:                        11.9   12.78 )-----------( 252      ( 07 Mar 2025 06:42 )             39.3     03-07    138  |  103  |  8   ----------------------------<  96  4.0   |  24  |  0.65    Ca    8.9      07 Mar 2025 06:42  Mg     2.1     03-06    TPro  7.2  /  Alb  3.7  /  TBili  0.5  /  DBili  x   /  AST  9[L]  /  ALT  13  /  AlkPhos  89  03-07    PT/INR - ( 07 Mar 2025 06:42 )   PT: 13.3 sec;   INR: 1.17 ratio         PTT - ( 07 Mar 2025 06:42 )  PTT:30.1 sec      Urinalysis Basic - ( 07 Mar 2025 06:42 )    Color: x / Appearance: x / SG: x / pH: x  Gluc: 96 mg/dL / Ketone: x  / Bili: x / Urobili: x   Blood: x / Protein: x / Nitrite: x   Leuk Esterase: x / RBC: x / WBC x   Sq Epi: x / Non Sq Epi: x / Bacteria: x          RADIOLOGY & ADDITIONAL TESTS:  Results Reviewed:   Imaging Personally Reviewed:  Electrocardiogram Personally Reviewed:    COORDINATION OF CARE:  Care Discussed with Consultants/Other Providers [Y/N]:  Prior or Outpatient Records Reviewed [Y/N]:  
Yuniel Meza DO  Division of Hospital Medicine  Available on [employer] TEAMS    Patient is a 49y old  Female who presents with a chief complaint of abdominal pain (08 Mar 2025 10:47)      INTERVAL HPI/OVERNIGHT EVENTS: Patient seen and examined at bedside. No overnight events. Endorses abdominal pain.     MEDICATIONS  (STANDING):  chlorhexidine 2% Cloths 1 Application(s) Topical daily  enoxaparin Injectable 40 milliGRAM(s) SubCutaneous every 24 hours  naloxone Injectable 0.4 milliGRAM(s) IV Push once  polyethylene glycol 3350 17 Gram(s) Oral daily  senna 2 Tablet(s) Oral at bedtime  sodium chloride 0.9%. 1000 milliLiter(s) (100 mL/Hr) IV Continuous <Continuous>  vancomycin  IVPB 1500 milliGRAM(s) IV Intermittent every 8 hours    MEDICATIONS  (PRN):  acetaminophen     Tablet .. 650 milliGRAM(s) Oral every 6 hours PRN Temp greater or equal to 38C (100.4F), Mild Pain (1 - 3), Moderate Pain (4 - 6)  aluminum hydroxide/magnesium hydroxide/simethicone Suspension 30 milliLiter(s) Oral every 4 hours PRN Dyspepsia  bisacodyl 5 milliGRAM(s) Oral daily PRN Constipation  melatonin 3 milliGRAM(s) Oral at bedtime PRN Insomnia  ondansetron Injectable 4 milliGRAM(s) IV Push every 8 hours PRN Nausea and/or Vomiting  oxyCODONE    IR 5 milliGRAM(s) Oral every 4 hours PRN Moderate Pain (4 - 6)  oxyCODONE    IR 10 milliGRAM(s) Oral every 4 hours PRN Severe Pain (7 - 10)      Allergies    [This allergen will not trigger allergy alert] COLD urticaria - has epi pen (Rash; Urticaria)  adhesives (Rash)  No Known Drug Allergies    Intolerances    codeine (Pruritus)  Dramamine (Other)      REVIEW OF SYSTEMS:  All other review of systems is negative unless indicated above       Vital Signs Last 24 Hrs  T(C): 36.8 (09 Mar 2025 08:10), Max: 37.2 (08 Mar 2025 15:20)  T(F): 98.3 (09 Mar 2025 08:10), Max: 99 (08 Mar 2025 15:20)  HR: 73 (09 Mar 2025 08:10) (73 - 86)  BP: 124/85 (09 Mar 2025 08:10) (113/72 - 151/90)  BP(mean): --  RR: 18 (09 Mar 2025 08:10) (18 - 18)  SpO2: 99% (09 Mar 2025 08:10) (96% - 100%)    Parameters below as of 09 Mar 2025 08:10  Patient On (Oxygen Delivery Method): room air        PHYSICAL EXAM:  GENERAL: NAD  HEENT:  anicteric, moist mucous membranes  CHEST/LUNG:  CTA b/l, no rales, wheezes, or rhonchi  HEART:  RRR, S1, S2  ABDOMEN:  BS+, soft, nontender, nondistended, Umbilical and transverse abdominal incisions well healed. IR drain in place  EXTREMITIES: no edema, cyanosis, or calf tenderness  NERVOUS SYSTEM: answers questions and follows commands appropriately    LABS:                        11.0   10.40 )-----------( 308      ( 09 Mar 2025 08:59 )             36.1     CBC Full  -  ( 09 Mar 2025 08:59 )  WBC Count : 10.40 K/uL  Hemoglobin : 11.0 g/dL  Hematocrit : 36.1 %  Platelet Count - Automated : 308 K/uL  Mean Cell Volume : 82.8 fl  Mean Cell Hemoglobin : 25.2 pg  Mean Cell Hemoglobin Concentration : 30.5 g/dL  Auto Neutrophil # : x  Auto Lymphocyte # : x  Auto Monocyte # : x  Auto Eosinophil # : x  Auto Basophil # : x  Auto Neutrophil % : x  Auto Lymphocyte % : x  Auto Monocyte % : x  Auto Eosinophil % : x  Auto Basophil % : x    09 Mar 2025 08:59    140    |  102    |  10     ----------------------------<  93     4.2     |  25     |  0.79     Ca    9.5        09 Mar 2025 08:59    TPro  7.0    /  Alb  3.5    /  TBili  0.2    /  DBili  x      /  AST  12     /  ALT  16     /  AlkPhos  86     09 Mar 2025 08:59      Urinalysis Basic - ( 09 Mar 2025 08:59 )    Color: x / Appearance: x / SG: x / pH: x  Gluc: 93 mg/dL / Ketone: x  / Bili: x / Urobili: x   Blood: x / Protein: x / Nitrite: x   Leuk Esterase: x / RBC: x / WBC x   Sq Epi: x / Non Sq Epi: x / Bacteria: x      CAPILLARY BLOOD GLUCOSE            Culture - Body Fluid with Gram Stain (collected 03-07-25 @ 16:15)  Source: Body Fluid Abdominal Seroma Drain  Gram Stain (03-08-25 @ 19:17):    No polymorphonuclear leukocytes per low power field    No organisms seen per oil power field  Preliminary Report (03-08-25 @ 19:17):    Few Staphylococcus aureus        RADIOLOGY & ADDITIONAL TESTS:    Personally reviewed.     Consultant(s) Notes Reviewed:  [x] YES  [ ] NO

## 2025-03-12 LAB
CULTURE RESULTS: ABNORMAL
ORGANISM # SPEC MICROSCOPIC CNT: ABNORMAL
ORGANISM # SPEC MICROSCOPIC CNT: ABNORMAL
SPECIMEN SOURCE: SIGNIFICANT CHANGE UP

## 2025-03-13 ENCOUNTER — NON-APPOINTMENT (OUTPATIENT)
Age: 50
End: 2025-03-13

## 2025-03-14 ENCOUNTER — APPOINTMENT (OUTPATIENT)
Dept: PLASTIC SURGERY | Facility: CLINIC | Age: 50
End: 2025-03-14

## 2025-03-14 DIAGNOSIS — S30.1XXD CONTUSION OF ABDOMINAL WALL, SUBSEQUENT ENCOUNTER: ICD-10-CM

## 2025-03-14 DIAGNOSIS — R63.4 ABNORMAL WEIGHT LOSS: ICD-10-CM

## 2025-03-14 DIAGNOSIS — Z98.890 OTHER SPECIFIED POSTPROCEDURAL STATES: ICD-10-CM

## 2025-03-14 DIAGNOSIS — M79.3 PANNICULITIS, UNSPECIFIED: ICD-10-CM

## 2025-03-14 PROCEDURE — 99213 OFFICE O/P EST LOW 20 MIN: CPT

## 2025-03-17 RX ORDER — ONDANSETRON 4 MG/1
4 TABLET, ORALLY DISINTEGRATING ORAL
Qty: 9 | Refills: 0 | Status: ACTIVE | COMMUNITY
Start: 2025-03-17 | End: 1900-01-01

## 2025-03-17 RX ORDER — OXYCODONE 5 MG/1
5 TABLET ORAL
Qty: 7 | Refills: 0 | Status: ACTIVE | COMMUNITY
Start: 2025-03-17 | End: 1900-01-01

## 2025-03-17 RX ORDER — CEFADROXIL 500 MG/1
500 CAPSULE ORAL TWICE DAILY
Qty: 14 | Refills: 0 | Status: ACTIVE | COMMUNITY
Start: 2025-03-17 | End: 1900-01-01

## 2025-03-19 RX ORDER — CHLORHEXIDINE GLUCONATE 2% 2 G/100ML
2 SOLUTION TOPICAL
Qty: 1 | Refills: 0 | Status: ACTIVE | COMMUNITY
Start: 2025-03-19 | End: 1900-01-01

## 2025-03-20 ENCOUNTER — RESULT REVIEW (OUTPATIENT)
Age: 50
End: 2025-03-20

## 2025-03-20 ENCOUNTER — OUTPATIENT (OUTPATIENT)
Dept: OUTPATIENT SERVICES | Facility: HOSPITAL | Age: 50
LOS: 1 days | End: 2025-03-20
Payer: MEDICARE

## 2025-03-20 ENCOUNTER — APPOINTMENT (OUTPATIENT)
Dept: PLASTIC SURGERY | Facility: HOSPITAL | Age: 50
End: 2025-03-20

## 2025-03-20 ENCOUNTER — TRANSCRIPTION ENCOUNTER (OUTPATIENT)
Age: 50
End: 2025-03-20

## 2025-03-20 VITALS
TEMPERATURE: 98 F | OXYGEN SATURATION: 95 % | WEIGHT: 289.91 LBS | RESPIRATION RATE: 17 BRPM | HEART RATE: 83 BPM | SYSTOLIC BLOOD PRESSURE: 125 MMHG | HEIGHT: 64 IN | DIASTOLIC BLOOD PRESSURE: 83 MMHG

## 2025-03-20 DIAGNOSIS — Z98.890 OTHER SPECIFIED POSTPROCEDURAL STATES: Chronic | ICD-10-CM

## 2025-03-20 DIAGNOSIS — Z90.3 ACQUIRED ABSENCE OF STOMACH [PART OF]: Chronic | ICD-10-CM

## 2025-03-20 DIAGNOSIS — S30.1XXD CONTUSION OF ABDOMINAL WALL, SUBSEQUENT ENCOUNTER: ICD-10-CM

## 2025-03-20 PROCEDURE — 10140 I&D HMTMA SEROMA/FLUID COLLJ: CPT

## 2025-03-20 PROCEDURE — 88304 TISSUE EXAM BY PATHOLOGIST: CPT | Mod: 26

## 2025-03-20 PROCEDURE — 11045 DBRDMT SUBQ TISS EACH ADDL: CPT

## 2025-03-20 PROCEDURE — 11042 DBRDMT SUBQ TIS 1ST 20SQCM/<: CPT

## 2025-03-20 DEVICE — CLIP APPLIER ETHICON LIGACLIP 11.5" MEDIUM: Type: IMPLANTABLE DEVICE | Status: FUNCTIONAL

## 2025-03-20 DEVICE — ARISTA 3GR: Type: IMPLANTABLE DEVICE | Status: FUNCTIONAL

## 2025-03-20 RX ORDER — ONDANSETRON HCL/PF 4 MG/2 ML
4 VIAL (ML) INJECTION ONCE
Refills: 0 | Status: DISCONTINUED | OUTPATIENT
Start: 2025-03-20 | End: 2025-03-20

## 2025-03-20 RX ORDER — DIPHENHYDRAMINE HCL 12.5MG/5ML
25 ELIXIR ORAL EVERY 4 HOURS
Refills: 0 | Status: ACTIVE | OUTPATIENT
Start: 2025-03-20 | End: 2026-02-16

## 2025-03-20 RX ORDER — SODIUM CHLORIDE 9 G/1000ML
1000 INJECTION, SOLUTION INTRAVENOUS
Refills: 0 | Status: ACTIVE | OUTPATIENT
Start: 2025-03-20 | End: 2025-03-21

## 2025-03-20 RX ORDER — SODIUM CHLORIDE 9 G/1000ML
1000 INJECTION, SOLUTION INTRAVENOUS
Refills: 0 | Status: ACTIVE | OUTPATIENT
Start: 2025-03-20 | End: 2026-02-16

## 2025-03-20 RX ORDER — HYDROMORPHONE/SOD CHLOR,ISO/PF 2 MG/10 ML
1 SYRINGE (ML) INJECTION ONCE
Refills: 0 | Status: DISCONTINUED | OUTPATIENT
Start: 2025-03-20 | End: 2025-03-20

## 2025-03-20 RX ORDER — CEFAZOLIN SODIUM IN 0.9 % NACL 3 G/100 ML
1000 INTRAVENOUS SOLUTION, PIGGYBACK (ML) INTRAVENOUS EVERY 8 HOURS
Refills: 0 | Status: COMPLETED | OUTPATIENT
Start: 2025-03-20 | End: 2025-03-21

## 2025-03-20 RX ORDER — SODIUM CHLORIDE 9 G/1000ML
1000 INJECTION, SOLUTION INTRAVENOUS
Refills: 0 | Status: DISCONTINUED | OUTPATIENT
Start: 2025-03-20 | End: 2025-03-20

## 2025-03-20 RX ORDER — HYDROMORPHONE/SOD CHLOR,ISO/PF 2 MG/10 ML
0.5 SYRINGE (ML) INJECTION
Refills: 0 | Status: DISCONTINUED | OUTPATIENT
Start: 2025-03-20 | End: 2025-03-20

## 2025-03-20 RX ORDER — ONDANSETRON HCL/PF 4 MG/2 ML
4 VIAL (ML) INJECTION EVERY 6 HOURS
Refills: 0 | Status: ACTIVE | OUTPATIENT
Start: 2025-03-20 | End: 2026-02-16

## 2025-03-20 RX ORDER — ACETAMINOPHEN 500 MG/5ML
650 LIQUID (ML) ORAL EVERY 6 HOURS
Refills: 0 | Status: ACTIVE | OUTPATIENT
Start: 2025-03-20 | End: 2026-02-16

## 2025-03-20 RX ORDER — OXYCODONE HYDROCHLORIDE 30 MG/1
5 TABLET ORAL EVERY 4 HOURS
Refills: 0 | Status: DISCONTINUED | OUTPATIENT
Start: 2025-03-20 | End: 2025-03-20

## 2025-03-20 RX ORDER — OXYCODONE HYDROCHLORIDE 30 MG/1
10 TABLET ORAL EVERY 4 HOURS
Refills: 0 | Status: DISCONTINUED | OUTPATIENT
Start: 2025-03-20 | End: 2025-03-20

## 2025-03-20 RX ORDER — SENNA 187 MG
2 TABLET ORAL AT BEDTIME
Refills: 0 | Status: ACTIVE | OUTPATIENT
Start: 2025-03-20 | End: 2026-02-16

## 2025-03-20 RX ADMIN — Medication 2 TABLET(S): at 22:25

## 2025-03-20 RX ADMIN — SODIUM CHLORIDE 50 MILLILITER(S): 9 INJECTION, SOLUTION INTRAVENOUS at 22:14

## 2025-03-20 RX ADMIN — Medication 1 MILLIGRAM(S): at 17:50

## 2025-03-20 RX ADMIN — Medication 100 MILLIGRAM(S): at 22:14

## 2025-03-20 RX ADMIN — Medication 1 MILLIGRAM(S): at 18:25

## 2025-03-20 NOTE — DISCHARGE NOTE PROVIDER - CARE PROVIDER_API CALL
Shikowitz-Behr, Lauren [facility]  Plastic Surgery  [address] [place] [address], [address]  [place] [address]  Phone: [phone]  Fax: [phone]  Follow Up Time:

## 2025-03-20 NOTE — DISCHARGE NOTE PROVIDER - NSDCMRMEDTOKEN_GEN_ALL_CORE_FT
cephalexin 500 mg oral tablet: 2 tab(s) orally every 8 hours  Tums 500 mg oral tablet, chewable: 1 tab(s) chewed 2 times a day as needed for  indigestion

## 2025-03-20 NOTE — DISCHARGE NOTE PROVIDER - NSDCFUADDINST_GEN_ALL_CORE_FT
Keep incisions clean and dry for 48hrs. May shower after 48hrs  Keep back to shower water. Replace binder after showering  Keep abdominal binder in place at all other times  Empty and record drain outputs twice a day  No heavy lifting or straining   Keep incisions clean and dry until seen in the office  May keep dressing in place until seen in the office  Keep abdominal binder in place at all other times  Empty and record drain outputs twice a day  No heavy lifting or straining

## 2025-03-20 NOTE — DISCHARGE NOTE PROVIDER - NSDCFUSCHEDAPPT_GEN_ALL_CORE_FT
Shikowitz-Behr, Lauren B  [facility] Physician [facility]  [address]AST[address]  Scheduled Appointment: 03/25/2025

## 2025-03-20 NOTE — DISCHARGE NOTE PROVIDER - NSDCHHNEEDSERVICEOTHER_GEN_ALL_CORE_FT
Patient is discharged with JOSELO drain. Empty it 2x a day and as needed, strip it, and record outputs accurately.

## 2025-03-20 NOTE — DISCHARGE NOTE PROVIDER - HOSPITAL COURSE
Sybil Hunt is a 48 y/o female with h/o weight loss s/p panniculectomy. Patient with persistent high output seroma cavity. She underwent excision of abdominal wall seroma cavity and tolerated it well. On POD#1 patient was tolerating diet, ambulating, voiding freely and drain was adequate in output. She was discharged home in stable condition  Discharge medications sent from the office preoperatively

## 2025-03-20 NOTE — DISCHARGE NOTE PROVIDER - NSDCCPCAREPLAN_GEN_ALL_CORE_FT
PRINCIPAL DISCHARGE DIAGNOSIS  Diagnosis: Abdominal wall seroma  Assessment and Plan of Treatment:

## 2025-03-21 ENCOUNTER — TRANSCRIPTION ENCOUNTER (OUTPATIENT)
Age: 50
End: 2025-03-21

## 2025-03-21 VITALS
SYSTOLIC BLOOD PRESSURE: 136 MMHG | TEMPERATURE: 98 F | OXYGEN SATURATION: 96 % | HEART RATE: 82 BPM | RESPIRATION RATE: 17 BRPM | DIASTOLIC BLOOD PRESSURE: 80 MMHG

## 2025-03-21 PROCEDURE — C9399: CPT

## 2025-03-21 PROCEDURE — 88304 TISSUE EXAM BY PATHOLOGIST: CPT

## 2025-03-21 PROCEDURE — 22903 EXC ABD LES SC 3 CM/>: CPT

## 2025-03-21 PROCEDURE — 87075 CULTR BACTERIA EXCEPT BLOOD: CPT

## 2025-03-21 PROCEDURE — 87077 CULTURE AEROBIC IDENTIFY: CPT

## 2025-03-21 PROCEDURE — 87070 CULTURE OTHR SPECIMN AEROBIC: CPT

## 2025-03-21 PROCEDURE — C1889: CPT

## 2025-03-21 RX ORDER — SENNA 187 MG
1 TABLET ORAL ONCE
Refills: 0 | Status: ACTIVE | OUTPATIENT
Start: 2025-03-21

## 2025-03-21 RX ADMIN — Medication 100 MILLIGRAM(S): at 06:09

## 2025-03-21 RX ADMIN — Medication 650 MILLIGRAM(S): at 06:13

## 2025-03-21 RX ADMIN — Medication 650 MILLIGRAM(S): at 06:51

## 2025-03-21 NOTE — DISCHARGE NOTE NURSING/CASE MANAGEMENT/SOCIAL WORK - FINANCIAL ASSISTANCE
[facility] provides services at a reduced cost to those who are determined to be eligible through [facility]’s financial assistance program. Information regarding [facility]’s financial assistance program can be found by going to [email or web address] or by calling [phone].

## 2025-03-21 NOTE — PROGRESS NOTE ADULT - ASSESSMENT
POD#1 s/p excision of abdominal wall seroma cavity.     continue drain  continue binder  will follow up OR cultures as outpatient  pain control  OOB, ambulate, IS  Discharge to home today, keep binder on and dry  Follow up next week

## 2025-03-21 NOTE — DISCHARGE NOTE NURSING/CASE MANAGEMENT/SOCIAL WORK - PATIENT PORTAL LINK FT
You can access the FollowMyHealth Patient Portal offered by [facility] by registering at the following website: [email or web address]. By joining [employer]’s FollowMyHealth portal, you will also be able to view your health information using other applications (apps) compatible with our system.

## 2025-03-21 NOTE — PROGRESS NOTE ADULT - SUBJECTIVE AND OBJECTIVE BOX
Patient seen and examined. She is POD#1 s/p excision of abdominal wall seroma cavity. She is recovering well.     Vital Signs Last 24 Hrs  T(C): 36.6 (21 Mar 2025 05:00), Max: 36.6 (20 Mar 2025 19:01)  T(F): 97.9 (21 Mar 2025 05:00), Max: 97.9 (20 Mar 2025 19:01)  HR: 72 (21 Mar 2025 05:00) (67 - 88)  BP: 92/62 (21 Mar 2025 05:00) (92/62 - 155/82)  BP(mean): --  RR: 17 (21 Mar 2025 05:00) (12 - 18)  SpO2: 94% (21 Mar 2025 05:00) (94% - 100%)    Parameters below as of 20 Mar 2025 19:01  Patient On (Oxygen Delivery Method): room air    I&O's Detail    20 Mar 2025 07:01  -  21 Mar 2025 07:00  --------------------------------------------------------  IN:    Lactated Ringers: 75 mL  Total IN: 75 mL    OUT:    Bulb (mL): 160 mL    Voided (mL): 400 mL  Total OUT: 560 mL    Total NET: -485 mL      Exam:  Abdomen is soft  incisions C/D/I  drain serosang.

## 2025-03-21 NOTE — DISCHARGE NOTE NURSING/CASE MANAGEMENT/SOCIAL WORK - NSDCPEFALRISK_GEN_ALL_CORE
For information on Fall & Injury Prevention, visit: [email or web address] OR  [email or web address] OR  https://www.cdc.gov/steadi/patient.html

## 2025-03-25 ENCOUNTER — APPOINTMENT (OUTPATIENT)
Dept: PLASTIC SURGERY | Facility: CLINIC | Age: 50
End: 2025-03-25
Payer: MEDICARE

## 2025-03-25 DIAGNOSIS — E65 LOCALIZED ADIPOSITY: ICD-10-CM

## 2025-03-25 DIAGNOSIS — S30.1XXD CONTUSION OF ABDOMINAL WALL, SUBSEQUENT ENCOUNTER: ICD-10-CM

## 2025-03-25 DIAGNOSIS — M79.3 PANNICULITIS, UNSPECIFIED: ICD-10-CM

## 2025-03-25 DIAGNOSIS — Z98.890 OTHER SPECIFIED POSTPROCEDURAL STATES: ICD-10-CM

## 2025-03-25 DIAGNOSIS — R63.4 ABNORMAL WEIGHT LOSS: ICD-10-CM

## 2025-03-25 LAB
CULTURE RESULTS: ABNORMAL
SPECIMEN SOURCE: SIGNIFICANT CHANGE UP

## 2025-03-25 PROCEDURE — 99024 POSTOP FOLLOW-UP VISIT: CPT

## 2025-03-28 LAB — SURGICAL PATHOLOGY STUDY: SIGNIFICANT CHANGE UP

## 2025-04-08 ENCOUNTER — APPOINTMENT (OUTPATIENT)
Dept: PLASTIC SURGERY | Facility: CLINIC | Age: 50
End: 2025-04-08

## 2025-04-08 DIAGNOSIS — R63.4 ABNORMAL WEIGHT LOSS: ICD-10-CM

## 2025-04-08 DIAGNOSIS — Z98.890 OTHER SPECIFIED POSTPROCEDURAL STATES: ICD-10-CM

## 2025-04-08 DIAGNOSIS — S30.1XXD CONTUSION OF ABDOMINAL WALL, SUBSEQUENT ENCOUNTER: ICD-10-CM

## 2025-04-08 DIAGNOSIS — M79.3 PANNICULITIS, UNSPECIFIED: ICD-10-CM

## 2025-04-08 DIAGNOSIS — E65 LOCALIZED ADIPOSITY: ICD-10-CM

## 2025-04-08 PROCEDURE — 99024 POSTOP FOLLOW-UP VISIT: CPT

## 2025-04-11 ENCOUNTER — APPOINTMENT (OUTPATIENT)
Dept: CT IMAGING | Facility: HOSPITAL | Age: 50
End: 2025-04-11

## 2025-04-22 ENCOUNTER — APPOINTMENT (OUTPATIENT)
Dept: PLASTIC SURGERY | Facility: CLINIC | Age: 50
End: 2025-04-22
Payer: MEDICARE

## 2025-04-22 PROCEDURE — 99212 OFFICE O/P EST SF 10 MIN: CPT

## 2025-04-24 RX ORDER — CEFADROXIL 500 MG/1
500 CAPSULE ORAL TWICE DAILY
Qty: 14 | Refills: 0 | Status: ACTIVE | COMMUNITY
Start: 2025-04-24 | End: 1900-01-01

## 2025-04-25 RX ORDER — SULFAMETHOXAZOLE AND TRIMETHOPRIM 800; 160 MG/1; MG/1
800-160 TABLET ORAL TWICE DAILY
Qty: 14 | Refills: 0 | Status: ACTIVE | COMMUNITY
Start: 2025-04-25 | End: 1900-01-01

## 2025-04-28 ENCOUNTER — NON-APPOINTMENT (OUTPATIENT)
Age: 50
End: 2025-04-28

## 2025-04-28 ENCOUNTER — APPOINTMENT (OUTPATIENT)
Dept: ULTRASOUND IMAGING | Facility: CLINIC | Age: 50
End: 2025-04-28
Payer: MEDICARE

## 2025-04-28 ENCOUNTER — APPOINTMENT (OUTPATIENT)
Dept: PLASTIC SURGERY | Facility: CLINIC | Age: 50
End: 2025-04-28
Payer: MEDICARE

## 2025-04-28 VITALS
SYSTOLIC BLOOD PRESSURE: 139 MMHG | WEIGHT: 290 LBS | HEIGHT: 69 IN | TEMPERATURE: 97.9 F | OXYGEN SATURATION: 97 % | DIASTOLIC BLOOD PRESSURE: 92 MMHG | BODY MASS INDEX: 42.95 KG/M2 | HEART RATE: 79 BPM

## 2025-04-28 PROCEDURE — 99212 OFFICE O/P EST SF 10 MIN: CPT

## 2025-04-28 PROCEDURE — 76705 ECHO EXAM OF ABDOMEN: CPT

## 2025-04-29 ENCOUNTER — APPOINTMENT (OUTPATIENT)
Dept: PLASTIC SURGERY | Facility: CLINIC | Age: 50
End: 2025-04-29

## 2025-05-01 ENCOUNTER — APPOINTMENT (OUTPATIENT)
Dept: INFECTIOUS DISEASE | Facility: CLINIC | Age: 50
End: 2025-05-01
Payer: MEDICARE

## 2025-05-01 VITALS
OXYGEN SATURATION: 95 % | BODY MASS INDEX: 43.4 KG/M2 | HEART RATE: 89 BPM | HEIGHT: 69 IN | SYSTOLIC BLOOD PRESSURE: 127 MMHG | TEMPERATURE: 98.5 F | WEIGHT: 293 LBS | DIASTOLIC BLOOD PRESSURE: 78 MMHG

## 2025-05-01 DIAGNOSIS — L02.211 CUTANEOUS ABSCESS OF ABDOMINAL WALL: ICD-10-CM

## 2025-05-01 PROCEDURE — 99213 OFFICE O/P EST LOW 20 MIN: CPT

## 2025-05-02 ENCOUNTER — NON-APPOINTMENT (OUTPATIENT)
Age: 50
End: 2025-05-02

## 2025-05-05 ENCOUNTER — RESULT REVIEW (OUTPATIENT)
Age: 50
End: 2025-05-05

## 2025-05-06 ENCOUNTER — APPOINTMENT (OUTPATIENT)
Dept: PLASTIC SURGERY | Facility: CLINIC | Age: 50
End: 2025-05-06
Payer: MEDICARE

## 2025-05-06 PROCEDURE — 99024 POSTOP FOLLOW-UP VISIT: CPT

## 2025-05-08 ENCOUNTER — OUTPATIENT (OUTPATIENT)
Dept: OUTPATIENT SERVICES | Facility: HOSPITAL | Age: 50
LOS: 1 days | End: 2025-05-08
Payer: MEDICARE

## 2025-05-08 ENCOUNTER — APPOINTMENT (OUTPATIENT)
Dept: CT IMAGING | Facility: IMAGING CENTER | Age: 50
End: 2025-05-08
Payer: MEDICARE

## 2025-05-08 DIAGNOSIS — Z98.890 OTHER SPECIFIED POSTPROCEDURAL STATES: Chronic | ICD-10-CM

## 2025-05-08 DIAGNOSIS — Z90.3 ACQUIRED ABSENCE OF STOMACH [PART OF]: Chronic | ICD-10-CM

## 2025-05-08 DIAGNOSIS — L02.211 CUTANEOUS ABSCESS OF ABDOMINAL WALL: ICD-10-CM

## 2025-05-08 DIAGNOSIS — Z00.8 ENCOUNTER FOR OTHER GENERAL EXAMINATION: ICD-10-CM

## 2025-05-08 PROCEDURE — 74177 CT ABD & PELVIS W/CONTRAST: CPT

## 2025-05-08 PROCEDURE — 74177 CT ABD & PELVIS W/CONTRAST: CPT | Mod: 26

## 2025-05-10 RX ORDER — CEFADROXIL 500 MG/1
500 CAPSULE ORAL TWICE DAILY
Qty: 14 | Refills: 0 | Status: ACTIVE | COMMUNITY
Start: 2025-05-10 | End: 1900-01-01

## 2025-05-12 ENCOUNTER — NON-APPOINTMENT (OUTPATIENT)
Age: 50
End: 2025-05-12

## 2025-05-13 ENCOUNTER — APPOINTMENT (OUTPATIENT)
Dept: PLASTIC SURGERY | Facility: CLINIC | Age: 50
End: 2025-05-13
Payer: MEDICARE

## 2025-05-13 DIAGNOSIS — R63.4 ABNORMAL WEIGHT LOSS: ICD-10-CM

## 2025-05-13 PROCEDURE — 99212 OFFICE O/P EST SF 10 MIN: CPT

## 2025-05-20 ENCOUNTER — APPOINTMENT (OUTPATIENT)
Dept: VASCULAR SURGERY | Facility: CLINIC | Age: 50
End: 2025-05-20
Payer: MEDICARE

## 2025-05-20 ENCOUNTER — APPOINTMENT (OUTPATIENT)
Dept: PLASTIC SURGERY | Facility: CLINIC | Age: 50
End: 2025-05-20

## 2025-05-20 VITALS
TEMPERATURE: 98.5 F | HEART RATE: 91 BPM | DIASTOLIC BLOOD PRESSURE: 79 MMHG | HEIGHT: 65 IN | WEIGHT: 293 LBS | BODY MASS INDEX: 48.82 KG/M2 | SYSTOLIC BLOOD PRESSURE: 114 MMHG

## 2025-05-20 DIAGNOSIS — I83.893 VARICOSE VEINS OF BILATERAL LOWER EXTREMITIES WITH OTHER COMPLICATIONS: ICD-10-CM

## 2025-05-20 PROCEDURE — 93970 EXTREMITY STUDY: CPT

## 2025-05-20 PROCEDURE — 99204 OFFICE O/P NEW MOD 45 MIN: CPT

## 2025-05-20 PROCEDURE — 99213 OFFICE O/P EST LOW 20 MIN: CPT

## 2025-05-20 RX ORDER — CEFADROXIL 500 MG/1
500 CAPSULE ORAL TWICE DAILY
Qty: 14 | Refills: 0 | Status: ACTIVE | COMMUNITY
Start: 2025-05-20 | End: 1900-01-01

## 2025-05-23 RX ORDER — CIPROFLOXACIN HYDROCHLORIDE 500 MG/1
500 TABLET, FILM COATED ORAL
Qty: 14 | Refills: 0 | Status: ACTIVE | COMMUNITY
Start: 2025-05-23 | End: 1900-01-01

## 2025-05-23 RX ORDER — CIPROFLOXACIN HYDROCHLORIDE 500 MG/1
500 TABLET, FILM COATED ORAL
Qty: 14 | Refills: 0 | Status: DISCONTINUED | COMMUNITY
Start: 2025-05-23 | End: 2025-05-23

## 2025-05-23 RX ORDER — AMOXICILLIN AND CLAVULANATE POTASSIUM 875; 125 MG/1; MG/1
875-125 TABLET, COATED ORAL
Qty: 14 | Refills: 0 | Status: ACTIVE | COMMUNITY
Start: 2025-05-23 | End: 1900-01-01

## 2025-05-24 ENCOUNTER — NON-APPOINTMENT (OUTPATIENT)
Age: 50
End: 2025-05-24

## 2025-05-27 ENCOUNTER — APPOINTMENT (OUTPATIENT)
Dept: PLASTIC SURGERY | Facility: CLINIC | Age: 50
End: 2025-05-27

## 2025-05-27 DIAGNOSIS — M79.3 PANNICULITIS, UNSPECIFIED: ICD-10-CM

## 2025-05-27 DIAGNOSIS — S30.1XXD CONTUSION OF ABDOMINAL WALL, SUBSEQUENT ENCOUNTER: ICD-10-CM

## 2025-05-27 DIAGNOSIS — E65 LOCALIZED ADIPOSITY: ICD-10-CM

## 2025-05-27 DIAGNOSIS — Z98.890 OTHER SPECIFIED POSTPROCEDURAL STATES: ICD-10-CM

## 2025-05-27 PROCEDURE — 99024 POSTOP FOLLOW-UP VISIT: CPT

## 2025-05-27 RX ORDER — AMOXICILLIN AND CLAVULANATE POTASSIUM 875; 125 MG/1; MG/1
875-125 TABLET, COATED ORAL
Qty: 14 | Refills: 0 | Status: ACTIVE | COMMUNITY
Start: 2025-05-27 | End: 1900-01-01

## 2025-06-05 ENCOUNTER — APPOINTMENT (OUTPATIENT)
Dept: CT IMAGING | Facility: CLINIC | Age: 50
End: 2025-06-05
Payer: MEDICARE

## 2025-06-05 ENCOUNTER — OUTPATIENT (OUTPATIENT)
Dept: OUTPATIENT SERVICES | Facility: HOSPITAL | Age: 50
LOS: 1 days | End: 2025-06-05
Payer: MEDICARE

## 2025-06-05 DIAGNOSIS — Z98.890 OTHER SPECIFIED POSTPROCEDURAL STATES: Chronic | ICD-10-CM

## 2025-06-05 DIAGNOSIS — S30.1XXD CONTUSION OF ABDOMINAL WALL, SUBSEQUENT ENCOUNTER: ICD-10-CM

## 2025-06-05 DIAGNOSIS — Z90.3 ACQUIRED ABSENCE OF STOMACH [PART OF]: Chronic | ICD-10-CM

## 2025-06-05 PROCEDURE — 74176 CT ABD & PELVIS W/O CONTRAST: CPT

## 2025-06-05 PROCEDURE — 74176 CT ABD & PELVIS W/O CONTRAST: CPT | Mod: 26

## 2025-06-06 ENCOUNTER — APPOINTMENT (OUTPATIENT)
Dept: INTERVENTIONAL RADIOLOGY/VASCULAR | Facility: CLINIC | Age: 50
End: 2025-06-06

## 2025-06-06 PROCEDURE — 99214 OFFICE O/P EST MOD 30 MIN: CPT | Mod: 2W

## 2025-06-09 ENCOUNTER — RESULT REVIEW (OUTPATIENT)
Age: 50
End: 2025-06-09

## 2025-06-09 ENCOUNTER — OUTPATIENT (OUTPATIENT)
Dept: OUTPATIENT SERVICES | Facility: HOSPITAL | Age: 50
LOS: 1 days | End: 2025-06-09
Payer: MEDICARE

## 2025-06-09 ENCOUNTER — TRANSCRIPTION ENCOUNTER (OUTPATIENT)
Age: 50
End: 2025-06-09

## 2025-06-09 VITALS
HEIGHT: 64 IN | OXYGEN SATURATION: 97 % | TEMPERATURE: 98 F | HEART RATE: 91 BPM | WEIGHT: 293 LBS | RESPIRATION RATE: 18 BRPM | DIASTOLIC BLOOD PRESSURE: 58 MMHG | SYSTOLIC BLOOD PRESSURE: 135 MMHG

## 2025-06-09 DIAGNOSIS — Z98.890 OTHER SPECIFIED POSTPROCEDURAL STATES: Chronic | ICD-10-CM

## 2025-06-09 DIAGNOSIS — S30.1XXD CONTUSION OF ABDOMINAL WALL, SUBSEQUENT ENCOUNTER: ICD-10-CM

## 2025-06-09 DIAGNOSIS — K65.1 PERITONEAL ABSCESS: ICD-10-CM

## 2025-06-09 DIAGNOSIS — Z90.3 ACQUIRED ABSENCE OF STOMACH [PART OF]: Chronic | ICD-10-CM

## 2025-06-09 LAB
GRAM STN FLD: SIGNIFICANT CHANGE UP
SPECIMEN SOURCE: SIGNIFICANT CHANGE UP

## 2025-06-09 PROCEDURE — C1769: CPT

## 2025-06-09 PROCEDURE — 10030 IMG GID FLU COLL DRG SFT TIS: CPT

## 2025-06-09 PROCEDURE — 87075 CULTR BACTERIA EXCEPT BLOOD: CPT

## 2025-06-09 PROCEDURE — 87070 CULTURE OTHR SPECIMN AEROBIC: CPT

## 2025-06-09 PROCEDURE — 87205 SMEAR GRAM STAIN: CPT

## 2025-06-09 PROCEDURE — C1729: CPT

## 2025-06-09 PROCEDURE — 87015 SPECIMEN INFECT AGNT CONCNTJ: CPT

## 2025-06-09 NOTE — PROCEDURE NOTE - PROCEDURE FINDINGS AND DETAILS
Successful US guided drainage of abdominal wall collection with placement of a 10.2Dr MPD. ~100cc of serous fluid was aspirated and sent for culture. Drain sutured in place and connected to bulb. Drain was additionally secured with a stat lock.

## 2025-06-09 NOTE — ASU DISCHARGE PLAN (ADULT/PEDIATRIC) - NURSING INSTRUCTIONS
Please feel free to contact us at [phone] if any questions or concerns arise. After 6PM on Monday through Friday (and weekends and holidays) please call [phone] and ask for the radiology resident on call to be paged..

## 2025-06-09 NOTE — ASU PATIENT PROFILE, ADULT - NSICDXPASTSURGICALHX_GEN_ALL_CORE_FT
PAST SURGICAL HISTORY:  H/O gastric sleeve     History of cholecystectomy     History of D&C 2011    History of dilation and curettage     S/P endoscopy 10/4/22 - to assess esophageal varices - no treatment at this time - stable    S/P panniculectomy

## 2025-06-09 NOTE — ASU PREOP CHECKLIST - HEIGHT IN CM
Presents to the ED with hemoglobin 6.1, blood loss from AV fistula site at dialysis center, that was difficult to control, achieved on 6/19. Anemia also has a component of chronic inflammatory anemia (high ferritin), coagulopathy labs show elevated INR 1.34 with TEG findings largely normal with no indication for FFP or cryo. Required 1 pRBC. Improving, stable Hb, no active bleed.   - Not daily monitoring given stability  - No indication for iron supplementation  - Potential improvement with nutrition as above  - Continue EPO with dialysis    162.56

## 2025-06-09 NOTE — H&P ADULT - HISTORY OF PRESENT ILLNESS
Interventional Radiology    HPI: 49 year old F with PMH Morbid obesity, HLD, HTN, s/p panniculectomy and ventral/umbilical hernia repair 10/28/24 c/b abdominal wall fluid collection s/p drainage at [facility] 12/20/24. Last appointment, pt underwent upsizing of drain from 12 Fr to 14 Fr. Patient most recently underwent washout and excision of abdominal wall seroma cavity with Dr Shikowitz-Behr on 03/20/2025. A drain was placed in OR and removed on 5/27. CT on 6/5 shows reaccumulation of anterior abd wall collection measuring 10.8 x 2.0 x 7.7cm. She is referred to Ir for new drainage placement and sclerosing. Patient reports foul smelling purulent discharge from her umbilicus. Denies f/c/n/v. She completed Augmentin 3 days ago.       Review of Systems:   Constitutional: No fever  Respiratory:  No shortness of breath  Cardiovascular: No chest pain    Allergies: [This allergen will not trigger allergy alert] COLD urticaria - has epi pen (Rash; Urticaria)  No Known Drug Allergies  adhesives (Rash)    Medications (Abx/Cardiac/Anticoagulation/Blood Products)      Data:    T(C): --  HR: --  BP: --  RR: --  SpO2: --    Physical Exam  General: No acute distress, nontoxic, A&Ox3  Chest: Non labored breathing    RADIOLOGY & ADDITIONAL TESTS:    Imaging Reviewed    H & P Note Reviewed from: 3/19/25    Plan: 49y Female presents for Abdominal wall collection drainage  -Risks/Benefits/alternatives explained with the patient and/or healthcare proxy and witnessed informed consent obtained.

## 2025-06-09 NOTE — ASU DISCHARGE PLAN (ADULT/PEDIATRIC) - CARE PROVIDER_API CALL
Ollie Gardner  Interventional Radiology and Diagnostic Radiology  [address] [place] [id number]  Phone: [phone]  Fax: [phone]  Follow Up Time: 2 weeks

## 2025-06-09 NOTE — ASU DISCHARGE PLAN (ADULT/PEDIATRIC) - ASU DC SPECIAL INSTRUCTIONSFT
Drain placement     Discharge Instructions  - You have had a drain placed.   - Keep the area clean and dry.  - Do not soak in a tub or pool with the drain, however you may shower with the drain and dressing covered in plastic wrap.  - Do not put traction on the drain and be careful that the drain does not get accidentally dislodged or kinked.  - Record output daily from the drain. Empty the bag as needed.  - You may resume your normal diet.  - You may resume your normal medications.  - It is normal to experience some pain over the site for the next few days. You may take Tylenol for that pain. Do not take more frequently than every 6 hours and do not exceed more than 3000mg of Tylenol in a 24 hour period.    Drain care:  -Disconnect tubing (tube attached to bag/ bulb)  from the catheter (catheter going into skin)  -Clean catheter with alcohol swab  -Twist on the flush syringe to the catheter (be sure to push the air out of syringe)  -Flush catheter with 5mL NS (DO NOT pull back on syringe). If you meet resistance upon flushing, STOP and contact IR.   -Disconnect syringe from catheter and reconnect drainage bag or bulb.    Dressing care:  -Wash hands thoroughly with water and soap for at least 20 seconds.   -take off old dressing and clean around drain site with gauze soaked with warm water  -After cleaning the site, dry and replace dressing with a new gauze and tegaderm.   -Place one piece of gauze underneath the drain and another piece of gauze on top of drain.   -Apply tegaderm (clear dressing) on top.  -Change dressing every 3 days or when soiled     Notify your primary physician and/or Interventional Radiology IMMEDIATELY if you experience any of the following       - Fever of 101F or 38C       - Chills or Rigors/ Shakes       - Swelling and/or Redness in the area of the puncture site       - Worsening Pain       - Blood soaked bandages or worsening bleeding       - Lightheadedness and/or dizziness upon standing       - Chest Pain/ Tightness       - Shortness of Breath       - Difficulty walking    If you have a problem that you believe requires IMMEDIATE attention, please go to your NEAREST Emergency Room. If you believe your problem can safely wait until you speak to a physician, please call Interventional Radiology for any concerns.    Please feel free to contact us at [phone] if any problems arise. After 6PM, Monday through Friday, on weekends and on holidays, please call [phone] and ask for the radiology resident on call to be paged.     Home care was arranged for you. You should be hearing from them over the next few days.

## 2025-06-09 NOTE — ASU PREOP CHECKLIST - AS BP NONINV METHOD
Patients GI provider:  Dr Winston New    Number to return call: [phone]    Reason for call: Pt calling to schedule a repeat ERCP   Connected to  line    Scheduled procedure/appointment date if applicable: Apt/procedure n/a electronic

## 2025-06-09 NOTE — ASU DISCHARGE PLAN (ADULT/PEDIATRIC) - FINANCIAL ASSISTANCE
[facility] provides services at a reduced cost to those who are determined to be eligible through [facility]’s financial assistance program. Information regarding [facility]’s financial assistance program can be found by going to [email or web address] or by calling [phone].

## 2025-06-12 ENCOUNTER — APPOINTMENT (OUTPATIENT)
Dept: SURGERY | Facility: CLINIC | Age: 50
End: 2025-06-12

## 2025-06-14 DIAGNOSIS — Z46.82 ENCOUNTER FOR FITTING AND ADJUSTMENT OF NON-VASCULAR CATHETER: ICD-10-CM

## 2025-06-14 DIAGNOSIS — L76.34 POSTPROCEDURAL SEROMA OF SKIN AND SUBCUTANEOUS TISSUE FOLLOWING OTHER PROCEDURE: ICD-10-CM

## 2025-06-14 LAB
CULTURE RESULTS: SIGNIFICANT CHANGE UP
SPECIMEN SOURCE: SIGNIFICANT CHANGE UP

## 2025-06-16 ENCOUNTER — LABORATORY RESULT (OUTPATIENT)
Age: 50
End: 2025-06-16

## 2025-06-16 ENCOUNTER — APPOINTMENT (OUTPATIENT)
Dept: INFECTIOUS DISEASE | Facility: CLINIC | Age: 50
End: 2025-06-16
Payer: MEDICARE

## 2025-06-16 VITALS
RESPIRATION RATE: 16 BRPM | BODY MASS INDEX: 48.82 KG/M2 | HEIGHT: 65 IN | OXYGEN SATURATION: 98 % | TEMPERATURE: 97.9 F | WEIGHT: 293 LBS | HEART RATE: 91 BPM | DIASTOLIC BLOOD PRESSURE: 79 MMHG | SYSTOLIC BLOOD PRESSURE: 142 MMHG
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
## 2025-06-16 LAB
ALBUMIN SERPL ELPH-MCNC: 4.2 G/DL
ALP BLD-CCNC: 92 U/L
ALT SERPL-CCNC: 19 U/L
ANION GAP SERPL CALC-SCNC: 13 MMOL/L
AST SERPL-CCNC: 19 U/L
BILIRUB SERPL-MCNC: 0.2 MG/DL
BUN SERPL-MCNC: 9 MG/DL
CALCIUM SERPL-MCNC: 9.6 MG/DL
CHLORIDE SERPL-SCNC: 103 MMOL/L
CO2 SERPL-SCNC: 23 MMOL/L
CREAT SERPL-MCNC: 0.62 MG/DL
EGFRCR SERPLBLD CKD-EPI 2021: 110 ML/MIN/1.73M2
GLUCOSE SERPL-MCNC: 88 MG/DL
POTASSIUM SERPL-SCNC: 4.6 MMOL/L
PROT SERPL-MCNC: 7.3 G/DL
SODIUM SERPL-SCNC: 139 MMOL/L

## 2025-06-16 PROCEDURE — 99214 OFFICE O/P EST MOD 30 MIN: CPT

## 2025-06-16 NOTE — ASU PATIENT PROFILE, ADULT - ABILITY TO HEAR (WITH HEARING AID OR HEARING APPLIANCE IF NORMALLY USED):
Quality 226: Preventive Care And Screening: Tobacco Use: Screening And Cessation Intervention: Tobacco Screening not Performed Detail Level: Detailed Quality 431: Preventive Care And Screening: Unhealthy Alcohol Use - Screening: Patient not screened for unhealthy alcohol use using a systematic screening method Quality 130: Documentation Of Current Medications In The Medical Record: Current Medications Documented Adequate: hears normal conversation without difficulty

## 2025-06-17 ENCOUNTER — TRANSCRIPTION ENCOUNTER (OUTPATIENT)
Age: 50
End: 2025-06-17

## 2025-06-17 ENCOUNTER — RESULT REVIEW (OUTPATIENT)
Age: 50
End: 2025-06-17

## 2025-06-17 ENCOUNTER — OUTPATIENT (OUTPATIENT)
Dept: OUTPATIENT SERVICES | Facility: HOSPITAL | Age: 50
LOS: 1 days | End: 2025-06-17
Payer: MEDICARE

## 2025-06-17 VITALS
RESPIRATION RATE: 16 BRPM | HEIGHT: 64 IN | SYSTOLIC BLOOD PRESSURE: 126 MMHG | HEART RATE: 108 BPM | DIASTOLIC BLOOD PRESSURE: 63 MMHG | TEMPERATURE: 98 F | WEIGHT: 293 LBS | OXYGEN SATURATION: 98 %

## 2025-06-17 DIAGNOSIS — Z98.890 OTHER SPECIFIED POSTPROCEDURAL STATES: Chronic | ICD-10-CM

## 2025-06-17 DIAGNOSIS — Z90.3 ACQUIRED ABSENCE OF STOMACH [PART OF]: Chronic | ICD-10-CM

## 2025-06-17 DIAGNOSIS — R18.8 OTHER ASCITES: ICD-10-CM

## 2025-06-17 PROCEDURE — 49423 EXCHANGE DRAINAGE CATHETER: CPT

## 2025-06-17 PROCEDURE — C1729: CPT

## 2025-06-17 PROCEDURE — C1769: CPT

## 2025-06-17 PROCEDURE — 75984 XRAY CONTROL CATHETER CHANGE: CPT

## 2025-06-17 PROCEDURE — 75984 XRAY CONTROL CATHETER CHANGE: CPT | Mod: 26

## 2025-06-17 RX ORDER — CEFADROXIL 500 MG/1
500 CAPSULE ORAL TWICE DAILY
Qty: 28 | Refills: 0 | Status: ACTIVE | COMMUNITY
Start: 2025-06-17 | End: 1900-01-01

## 2025-06-17 NOTE — PROCEDURE NOTE - PROCEDURE FINDINGS AND DETAILS
Difficult injection of contrast through 10.2 Fr abdominal drain however able to observe large abdominal collection. Drain removed over guidewire clogged with thick yellow gelatinous fluid. Drain upsized to 12 Fr MPD. Tolerated procedure well. Clean, dry and sterile dressing applied. Full report to follow.

## 2025-06-17 NOTE — ASU PATIENT PROFILE, ADULT - FALL HARM RISK - UNIVERSAL INTERVENTIONS
Second attempt to reach pt regarding BG over 400 unsuccessful. No VM set up.    Bed in lowest position, wheels locked, appropriate side rails in place/Call bell, personal items and telephone in reach/Instruct patient to call for assistance before getting out of bed or chair/Non-slip footwear when patient is out of bed/[place] to call system/Physically safe environment - no spills, clutter or unnecessary equipment/Purposeful Proactive Rounding/Room/bathroom lighting operational, light cord in reach

## 2025-06-17 NOTE — ASU DISCHARGE PLAN (ADULT/PEDIATRIC) - ASU DC SPECIAL INSTRUCTIONSFT
Drain Check and Upsizing    Discharge Instructions  - You have had a drain checked and upsized.  - Keep the area clean and dry.  - Do not soak in a tub or pool with the drain, however you may shower with the drain and dressing covered in plastic wrap.  - Do not put traction on the drain and be careful that the drain does not get accidentally dislodged or kinked.  - Record output daily from the drain. Empty the bag as needed.  - You may resume your normal diet.  - You may resume your normal medications.  - It is normal to experience some pain over the site for the next few days. You may take apply ice to the area (20 minutes on, 20 minutes off) and take Tylenol for that pain. Do not take more frequently than every 6 hours and do not exceed more than 3000mg of Tylenol in a 24 hour period.    Notify your primary physician and/or Interventional Radiology IMMEDIATELY if you experience any of the following       - Fever of 100.4F  or 38C       - Chills or Rigors/ Shakes       - Swelling and/or Redness in the area of the puncture site       - Worsening Pain       - Blood soaked bandages or worsening bleeding       - Lightheadedness and/or dizziness upon standing       - Chest Pain/ Tightness       - Shortness of Breath       - Difficulty walking    If you have a problem that you believe requires IMMEDIATE attention, please go to your NEAREST Emergency Room. If you believe your problem can safely wait until you speak to a physician, please call Interventional Radiology for any concerns.    Please feel free to contact us at [phone] if any problems arise. After 6PM, Monday through Friday, on weekends and on holidays, please call [phone] and ask for the radiology resident on call to be paged.

## 2025-06-17 NOTE — PRE PROCEDURE NOTE - PRE PROCEDURE EVALUATION
Interventional Radiology    HPI: 49 year old F with PMH Morbid obesity, HLD, HTN, s/p panniculectomy and ventral/umbilical hernia repair 10/28/24 c/b abdominal wall fluid collection s/p drainage at [facility] 12/20/24. Last appointment, pt underwent upsizing of drain from 12 Fr to 14 Fr. Patient most recently underwent washout and excision of abdominal wall seroma cavity with Dr Shikowitz-Behr on 03/20/2025. A drain was placed in OR and removed on 5/27. CT on 6/5 shows reaccumulation of anterior abd wall collection measuring 10.8 x 2.0 x 7.7cm. S/p drain placement on 6/9/2.     She now presents with clogged tube and abdominal discomfort.     Allergies: [This allergen will not trigger allergy alert] COLD urticaria - has epi pen (Rash; Urticaria)  adhesives (Rash)  No Known Drug Allergies    Medications (Abx/Cardiac/Anticoagulation/Blood Products)      Data:  162.6  137.9  T(C): 36.8  HR: 108  BP: 126/63  RR: 16  SpO2: 98%    Exam  General: No acute distress  Chest: Non labored breathing  Abdomen: Non-distended  Extremities: No swelling, warm  Imaging:     Plan: 49y Female presents for abdominal drain evaluation/exchange with possible upsizing.    -Risks/Benefits/alternatives explained with the patient and/or healthcare proxy and witnessed informed consent obtained.    Interventional Radiology    HPI: 49 year old F with PMH Morbid obesity, HLD, HTN, s/p panniculectomy and ventral/umbilical hernia repair 10/28/24 c/b abdominal wall fluid collection s/p drainage at [facility] 12/20/24. Last appointment, pt underwent upsizing of drain from 12 Fr to 14 Fr. Patient most recently underwent washout and excision of abdominal wall seroma cavity with Dr Shikowitz-Behr on 03/20/2025. A drain was placed in OR and removed on 5/27. CT on 6/5 shows reaccumulation of anterior abd wall collection measuring 10.8 x 2.0 x 7.7cm. S/p drain placement on 6/9/2.     She now presents with clogged tube and abdominal discomfort.     Allergies: [This allergen will not trigger allergy alert] COLD urticaria - has epi pen (Rash; Urticaria)  adhesives (Rash)  No Known Drug Allergies    Medications (Abx/Cardiac/Anticoagulation/Blood Products)      Data:  162.6  137.9  T(C): 36.8  HR: 108  BP: 126/63  RR: 16  SpO2: 98%    Exam  General: No acute distress  Chest: Non labored breathing  Abdomen: Non-distended  Extremities: No swelling, warm  Imaging:     Plan: 49y Female presents for abdominal drain evaluation/exchange with possible upsizing. Case discussed with pt's surgeon, Dr. Haas who agrees with plan.     -Risks/Benefits/alternatives explained with the patient and/or healthcare proxy and witnessed informed consent obtained.

## 2025-06-17 NOTE — ASU DISCHARGE PLAN (ADULT/PEDIATRIC) - NURSING INSTRUCTIONS
Please feel free to contact us at [phone] if any problems arise. After 6PM, Monday through Friday, on weekends and on holidays, please call [phone] and ask for the radiology resident on call to be paged.

## 2025-06-17 NOTE — ASU DISCHARGE PLAN (ADULT/PEDIATRIC) - FINANCIAL ASSISTANCE
[facility] provides services at a reduced cost to those who are determined to be eligible through [facility]’s financial assistance program. Information regarding [facility]’s financial assistance program can be found by going to [email or web address] or by calling [phone].

## 2025-06-19 LAB
A PHAGO GROEL BLD QL NAA+NON-PROBE: NEGATIVE
B MICROTI IGG TITR SER: NORMAL
BABESIA ANTIBODIES, IGM: NORMAL
E CANIS+EWIN GROEL BLD QL NAA+NON-PROBE: NEGATIVE
E CHAFF GROEL BLD QL NAA+NON-PROBE: NEGATIVE
E MURIS EAUCL GROEL BLD QL NAA+NON-PRB: NEGATIVE

## 2025-06-20 ENCOUNTER — NON-APPOINTMENT (OUTPATIENT)
Age: 50
End: 2025-06-20

## 2025-06-20 ENCOUNTER — EMERGENCY (EMERGENCY)
Facility: HOSPITAL | Age: 50
LOS: 1 days | End: 2025-06-20
Attending: INTERNAL MEDICINE | Admitting: INTERNAL MEDICINE
Payer: MEDICARE

## 2025-06-20 VITALS
OXYGEN SATURATION: 97 % | WEIGHT: 293 LBS | HEIGHT: 64 IN | HEART RATE: 98 BPM | SYSTOLIC BLOOD PRESSURE: 164 MMHG | RESPIRATION RATE: 20 BRPM | DIASTOLIC BLOOD PRESSURE: 104 MMHG | TEMPERATURE: 98 F

## 2025-06-20 DIAGNOSIS — Z98.890 OTHER SPECIFIED POSTPROCEDURAL STATES: Chronic | ICD-10-CM

## 2025-06-20 LAB
ALBUMIN SERPL ELPH-MCNC: 3.1 G/DL — LOW (ref 3.3–5)
ALP SERPL-CCNC: 92 U/L — SIGNIFICANT CHANGE UP (ref 40–120)
ALT FLD-CCNC: 25 U/L — SIGNIFICANT CHANGE UP (ref 10–45)
ANION GAP SERPL CALC-SCNC: 4 MMOL/L — LOW (ref 5–17)
AST SERPL-CCNC: 16 U/L — SIGNIFICANT CHANGE UP (ref 10–40)
BASOPHILS # BLD AUTO: 0.1 K/UL — SIGNIFICANT CHANGE UP (ref 0–0.2)
BASOPHILS NFR BLD AUTO: 0.8 % — SIGNIFICANT CHANGE UP (ref 0–2)
BILIRUB SERPL-MCNC: 0.1 MG/DL — LOW (ref 0.2–1.2)
BUN SERPL-MCNC: 13 MG/DL — SIGNIFICANT CHANGE UP (ref 7–23)
CALCIUM SERPL-MCNC: 9 MG/DL — SIGNIFICANT CHANGE UP (ref 8.4–10.5)
CHLORIDE SERPL-SCNC: 108 MMOL/L — SIGNIFICANT CHANGE UP (ref 96–108)
CO2 SERPL-SCNC: 32 MMOL/L — HIGH (ref 22–31)
CREAT SERPL-MCNC: 1 MG/DL — SIGNIFICANT CHANGE UP (ref 0.5–1.3)
EGFR: 69 ML/MIN/1.73M2 — SIGNIFICANT CHANGE UP
EGFR: 69 ML/MIN/1.73M2 — SIGNIFICANT CHANGE UP
EOSINOPHIL # BLD AUTO: 0.24 K/UL — SIGNIFICANT CHANGE UP (ref 0–0.5)
EOSINOPHIL NFR BLD AUTO: 1.8 % — SIGNIFICANT CHANGE UP (ref 0–6)
GLUCOSE SERPL-MCNC: 106 MG/DL — HIGH (ref 70–99)
HCT VFR BLD CALC: 36.9 % — SIGNIFICANT CHANGE UP (ref 34.5–45)
HGB BLD-MCNC: 11.4 G/DL — LOW (ref 11.5–15.5)
IMM GRANULOCYTES NFR BLD AUTO: 0.4 % — SIGNIFICANT CHANGE UP (ref 0–0.9)
LIDOCAIN IGE QN: 36 U/L — SIGNIFICANT CHANGE UP (ref 16–77)
LYMPHOCYTES # BLD AUTO: 24.9 % — SIGNIFICANT CHANGE UP (ref 13–44)
LYMPHOCYTES # BLD AUTO: 3.28 K/UL — SIGNIFICANT CHANGE UP (ref 1–3.3)
MCHC RBC-ENTMCNC: 25.5 PG — LOW (ref 27–34)
MCHC RBC-ENTMCNC: 30.9 G/DL — LOW (ref 32–36)
MCV RBC AUTO: 82.6 FL — SIGNIFICANT CHANGE UP (ref 80–100)
MONOCYTES # BLD AUTO: 1.12 K/UL — HIGH (ref 0–0.9)
MONOCYTES NFR BLD AUTO: 8.5 % — SIGNIFICANT CHANGE UP (ref 2–14)
NEUTROPHILS # BLD AUTO: 8.36 K/UL — HIGH (ref 1.8–7.4)
NEUTROPHILS NFR BLD AUTO: 63.6 % — SIGNIFICANT CHANGE UP (ref 43–77)
NRBC BLD AUTO-RTO: 0 /100 WBCS — SIGNIFICANT CHANGE UP (ref 0–0)
PLATELET # BLD AUTO: 380 K/UL — SIGNIFICANT CHANGE UP (ref 150–400)
POTASSIUM SERPL-MCNC: 4 MMOL/L — SIGNIFICANT CHANGE UP (ref 3.5–5.3)
POTASSIUM SERPL-SCNC: 4 MMOL/L — SIGNIFICANT CHANGE UP (ref 3.5–5.3)
PROT SERPL-MCNC: 7.7 G/DL — SIGNIFICANT CHANGE UP (ref 6–8.3)
RBC # BLD: 4.47 M/UL — SIGNIFICANT CHANGE UP (ref 3.8–5.2)
RBC # FLD: 13.7 % — SIGNIFICANT CHANGE UP (ref 10.3–14.5)
SODIUM SERPL-SCNC: 144 MMOL/L — SIGNIFICANT CHANGE UP (ref 135–145)
WBC # BLD: 13.15 K/UL — HIGH (ref 3.8–10.5)
WBC # FLD AUTO: 13.15 K/UL — HIGH (ref 3.8–10.5)

## 2025-06-20 PROCEDURE — 74177 CT ABD & PELVIS W/CONTRAST: CPT | Mod: 26

## 2025-06-20 PROCEDURE — 99285 EMERGENCY DEPT VISIT HI MDM: CPT

## 2025-06-20 RX ORDER — ACETAMINOPHEN 500 MG/5ML
1000 LIQUID (ML) ORAL ONCE
Refills: 0 | Status: COMPLETED | OUTPATIENT
Start: 2025-06-20 | End: 2025-06-20

## 2025-06-20 RX ADMIN — Medication 400 MILLIGRAM(S): at 22:46

## 2025-06-20 RX ADMIN — Medication 1000 MILLIGRAM(S): at 23:16

## 2025-06-20 RX ADMIN — Medication 1000 MILLILITER(S): at 21:00

## 2025-06-20 NOTE — ED PROVIDER NOTE - PROGRESS NOTE DETAILS
Patient signed out pending results of CT scan which unremarkable for any acute abdominal pathology.  Per signout plan I will discharge patient home at this time.

## 2025-06-20 NOTE — ED PROVIDER NOTE - PATIENT PORTAL LINK FT
You can access the FollowMyHealth Patient Portal offered by [facility] by registering at the following website: [email or web address]. By joining [facility]’s FollowMyHealth portal, you will also be able to view your health information using other applications (apps) compatible with our system.

## 2025-06-20 NOTE — ED PROVIDER NOTE - CLINICAL SUMMARY MEDICAL DECISION MAKING FREE TEXT BOX
49-year-old female came to the emergency room chief complaint of lower abdominal pain associated with swelling in the upper abdomen patient stated that she had resection of the loose skin after she had a bariatric surgery she lost 100 pounds postsurgically she developed seroma and she had the drain placed multiple times she had the drain on the left lower abdomen placed recently approximately 2 weeks ago     Case was discussed with the patient's plastic surgeon Dr. Vicky cantrell telephone [phone] recommended CT of the abdomen if negative discharge patient has sclerotomy of the seroma scheduled by the interventional radiology at [facility] s/o to dr citlali drake Follow-up CT of the abdomen and plan disposition accordingly any unusual findings please inform the plastic surgeon

## 2025-06-20 NOTE — ED PROVIDER NOTE - PHYSICAL EXAMINATION
General:     NAD, well-nourished, well-appearing  Head:     NC/AT, EOMI, oral mucosa moist  Neck:     trachea midline  Lungs:     CTA b/l, no w/r/r  CVS:     S1S2, RRR, no m/g/r  Abd:     +BS, s/Positive suprapubic tenderness left lower quadrant tenderness no rebound no guarding distended abdomen likely obese  Ext:    2+ radial and pedal pulses, no c/c/e  Neuro: AAOx3, no sensory/motor deficits

## 2025-06-20 NOTE — ED ADULT NURSE NOTE - NSFALLUNIVINTERV_ED_ALL_ED
Bed/Stretcher in lowest position, wheels locked, appropriate side rails in place/Call bell, personal items and telephone in reach/Instruct patient to call for assistance before getting out of bed/chair/stretcher/Non-slip footwear applied when patient is off stretcher/[place] to call system/Physically safe environment - no spills, clutter or unnecessary equipment/Purposeful proactive rounding/Room/bathroom lighting operational, light cord in reach

## 2025-06-20 NOTE — ED PROVIDER NOTE - CARE PLAN
Goal:	Left lower quadrant abdominal pain,   1 Principal Discharge DX:	Lower abdominal pain  Goal:	Left lower quadrant abdominal pain,

## 2025-06-20 NOTE — ED PROVIDER NOTE - OBJECTIVE STATEMENT
49-year-old female came to the emergency room chief complaint of lower abdominal pain associated with swelling in the upper abdomen patient stated that she had resection of the loose skin after she had a bariatric surgery she lost 100 pounds postsurgically she developed seroma and she had the drain placed multiple times she had the drain on the left lower abdomen placed recently approximately 2 weeks ago

## 2025-06-20 NOTE — ED PROVIDER NOTE - DISPOSITION TYPE
Past Medical History:   Diagnosis Date    Migraine     SAB (spontaneous )     1ST TRIMESTER     Patient Active Problem List   Diagnosis    Dyspepsia    Lumbago    Chronic mixed headache syndrome    Uterine leiomyoma, unspecified location    AMA (advanced maternal age) multigravida 35+    Grand multiparity    Somatic dysfunction of lumbar region    Endometritis    GERD (gastroesophageal reflux disease)    Multigravida of advanced maternal age in third trimester    Morbid obesity (H)     Past Surgical History:   Procedure Laterality Date    D & C       Social History     Socioeconomic History    Marital status:      Spouse name: Not on file    Number of children: Not on file    Years of education: Not on file    Highest education level: Not on file   Occupational History    Not on file   Tobacco Use    Smoking status: Never    Smokeless tobacco: Never   Vaping Use    Vaping Use: Never used   Substance and Sexual Activity    Alcohol use: No    Drug use: No    Sexual activity: Yes     Partners: Male     Birth control/protection: None   Other Topics Concern    Parent/sibling w/ CABG, MI or angioplasty before 65F 55M? No     Service No    Blood Transfusions No    Caffeine Concern No    Occupational Exposure No    Hobby Hazards No    Sleep Concern No    Stress Concern No    Weight Concern No    Special Diet Yes    Back Care No    Exercise Yes    Bike Helmet No    Seat Belt Yes    Self-Exams Yes   Social History Narrative    Mother of 4, all starting school this yearWorks in  can bring her kids there too.  is a teacher , out of work now.         How much exercise per week? 3 days     How much calcium per day? 1 serving      How much caffeine per day? 3 cups    How much vitamin D per day?  prenatals    Do you/your family wear seatbelts?  Yes    Do you/your family use safety helmets? na    Do you/your family use sunscreen?No    Do you/your family keep firearms in the home? No    Do  you/your family have a smoke detector(s)? Yes        Do you feel safe in your home? Yes    Has anyone ever touched you in an unwanted manner?      Explain         June 11, 2014 Shaneka Abbasi LPN        Reviewed mikal espinoza 12-         Social Determinants of Health     Financial Resource Strain: Not on file   Food Insecurity: Not on file   Transportation Needs: Not on file   Physical Activity: Not on file   Stress: Not on file   Social Connections: Not on file   Interpersonal Safety: Not on file   Housing Stability: Not on file     Family History   Problem Relation Age of Onset    Allergies Mother         angioedema in family members unspecified    Diabetes Mother     Hypertension Mother     Diabetes Father     Hypertension Father     Diabetes Maternal Grandmother     Hypertension Maternal Grandmother     Asthma Maternal Grandmother     Diabetes Maternal Grandfather     Hypertension Maternal Grandfather     Asthma Maternal Grandfather     Cancer No family hx of     Cerebrovascular Disease No family hx of     Thyroid Disease No family hx of     Glaucoma No family hx of     Macular Degeneration No family hx of     Breast Cancer No family hx of     Colon Cancer No family hx of     Depression No family hx of     Anxiety Disorder No family hx of     Genetic Disorder No family hx of          Subjective findings- 43-year-old presents today as an add-on.  Relates to getting left heel pain for about 2 years duration, had orthotics but stopped wearing them because she thought they made it worse, seen [facility] and had x-rays that were negative in July, has done physical therapy and feels that maybe made it worse, has been using Voltaren gel without help.    Objective findings DP PT are 2 out of 4 left.  Has pain on palpation of plantar left heel, has pain on palpation along the peroneal tendon course left.  Has mild edema along the peroneal tendon course left.  There is no erythema, no drainage, no odor, no calor,  no gross tendon voids.  Reviewed x-ray results of the right and left foot 3 views foot x-rays from [facility] dated 6/2/2023 as noted in the EMR.    Assessment and plan plantar fasciitis left, peroneal tendinopathy left.  Diagnosis and treatment options discussed with the patient.  Multiligamentous ankle brace dispensed and use discussed with the patient.  MRI of the left ankle ordered and use discussed with her.  Return to clinic and see me after the MRI is complete.  Previous notes reviewed.              Moderate level of medical decision making.   DISCHARGE

## 2025-06-20 NOTE — ED ADULT TRIAGE NOTE - CHIEF COMPLAINT QUOTE
Patient presents to ED complaining of diffuse abdominal pain and swelling s/p drain placement on 6/9/25, pt reports abdominal plasty done on 10/28/24 and revision done on 03/20/25

## 2025-06-21 ENCOUNTER — NON-APPOINTMENT (OUTPATIENT)
Age: 50
End: 2025-06-21

## 2025-06-21 VITALS
HEART RATE: 99 BPM | DIASTOLIC BLOOD PRESSURE: 89 MMHG | OXYGEN SATURATION: 98 % | RESPIRATION RATE: 18 BRPM | SYSTOLIC BLOOD PRESSURE: 150 MMHG

## 2025-06-21 PROCEDURE — 80053 COMPREHEN METABOLIC PANEL: CPT

## 2025-06-21 PROCEDURE — 96374 THER/PROPH/DIAG INJ IV PUSH: CPT | Mod: XU

## 2025-06-21 PROCEDURE — 99284 EMERGENCY DEPT VISIT MOD MDM: CPT | Mod: 25

## 2025-06-21 PROCEDURE — 74177 CT ABD & PELVIS W/CONTRAST: CPT

## 2025-06-21 PROCEDURE — 36415 COLL VENOUS BLD VENIPUNCTURE: CPT

## 2025-06-21 PROCEDURE — 85025 COMPLETE CBC W/AUTO DIFF WBC: CPT

## 2025-06-21 PROCEDURE — 83690 ASSAY OF LIPASE: CPT

## 2025-06-22 PROBLEM — R06.09 DYSPNEA ON EXERTION: Status: ACTIVE | Noted: 2020-12-10

## 2025-06-23 ENCOUNTER — APPOINTMENT (OUTPATIENT)
Dept: SURGERY | Facility: CLINIC | Age: 50
End: 2025-06-23
Payer: MEDICARE

## 2025-06-23 ENCOUNTER — OUTPATIENT (OUTPATIENT)
Dept: OUTPATIENT SERVICES | Facility: HOSPITAL | Age: 50
LOS: 1 days | End: 2025-06-23
Payer: MEDICARE

## 2025-06-23 ENCOUNTER — TRANSCRIPTION ENCOUNTER (OUTPATIENT)
Age: 50
End: 2025-06-23

## 2025-06-23 VITALS
TEMPERATURE: 97.9 F | SYSTOLIC BLOOD PRESSURE: 138 MMHG | OXYGEN SATURATION: 99 % | RESPIRATION RATE: 16 BRPM | DIASTOLIC BLOOD PRESSURE: 82 MMHG | HEIGHT: 65 IN | HEART RATE: 87 BPM | WEIGHT: 293 LBS | BODY MASS INDEX: 48.82 KG/M2

## 2025-06-23 VITALS
RESPIRATION RATE: 20 BRPM | WEIGHT: 293 LBS | DIASTOLIC BLOOD PRESSURE: 84 MMHG | TEMPERATURE: 98 F | HEIGHT: 64 IN | OXYGEN SATURATION: 98 % | HEART RATE: 88 BPM | SYSTOLIC BLOOD PRESSURE: 140 MMHG

## 2025-06-23 DIAGNOSIS — L02.211 CUTANEOUS ABSCESS OF ABDOMINAL WALL: ICD-10-CM

## 2025-06-23 DIAGNOSIS — Z98.890 OTHER SPECIFIED POSTPROCEDURAL STATES: Chronic | ICD-10-CM

## 2025-06-23 DIAGNOSIS — Z90.3 ACQUIRED ABSENCE OF STOMACH [PART OF]: Chronic | ICD-10-CM

## 2025-06-23 PROCEDURE — 49185 SCLEROTX FLUID COLLECTION: CPT

## 2025-06-23 PROCEDURE — 99214 OFFICE O/P EST MOD 30 MIN: CPT

## 2025-06-23 NOTE — ASU DISCHARGE PLAN (ADULT/PEDIATRIC) - ASU DC SPECIAL INSTRUCTIONSFT
Drain Check with Collection Sclerosing    Discharge Instructions  - You have had a drain checked with abdominal collection sclerosing  - Keep the area clean and dry.  - Do not soak in a tub or pool with the drain, however you may shower with the drain and dressing covered in plastic wrap.  - Do not put traction on the drain and be careful that the drain does not get accidentally dislodged or kinked.  - Record output daily from the drain. Empty the bag as needed.  - You may experience some abdominal discomfort and cramping this should resolve within a couple of days  - You may resume your normal diet.  - You may resume your normal medications.  - It is normal to experience some pain over the site for the next few days. You may take apply ice to the area (20 minutes on, 20 minutes off) and take Tylenol for that pain. Do not take more frequently than every 6 hours and do not exceed more than 3000mg of Tylenol in a 24 hour period.    Notify your primary physician and/or Interventional Radiology IMMEDIATELY if you experience any of the following       - Fever of 100.4F  or 38C       - Chills or Rigors/ Shakes       - Swelling and/or Redness in the area of the puncture site       - Worsening Pain       - Blood soaked bandages or worsening bleeding       - Lightheadedness and/or dizziness upon standing       - Chest Pain/ Tightness       - Shortness of Breath       - Difficulty walking    If you have a problem that you believe requires IMMEDIATE attention, please go to your NEAREST Emergency Room. If you believe your problem can safely wait until you speak to a physician, please call Interventional Radiology for any concerns.    Please feel free to contact us at [phone] if any problems arise. After 6PM, Monday through Friday, on weekends and on holidays, please call [phone] and ask for the radiology resident on call to be paged.

## 2025-06-23 NOTE — ASU DISCHARGE PLAN (ADULT/PEDIATRIC) - NURSING INSTRUCTIONS
Please feel free to contact us at [phone] if any problems arise. After 6PM, Monday through Friday, on weekends and on holidays, please call [phone] and ask for the radiology resident on call to be paged.

## 2025-06-23 NOTE — ASU DISCHARGE PLAN (ADULT/PEDIATRIC) - COMMENTS
Please hold flushing for 24hrs then resume your normal flushing    Please make follow up appt in 2 weeks for repeat drain evaluation

## 2025-06-23 NOTE — PROCEDURE NOTE - NSINFORMCONSENT_GEN_A_CORE
86221  Chief Complaint   Patient presents with   • Routine Prenatal Visit     Patient c/o low abdominal pain for 2 weeks, and is having trouble sleeping        HARI Santiago is a  currently at 29w5d who today reports the following:   Round ligament pain   Trouble sleeping sometimes  Heartburn    Good FM     EXAM  /68   Wt 81.2 kg (179 lb)   LMP 2020 (Exact Date)   BMI 28.89 kg/m²  -See Prenatal Assessment  General Appearance:  Pleasant  Lungs: Breathing unlabored  Abdomen:  See flow sheet for Fundal ht, FM, FHT's  LE: Neg edema  V/E: Not performed     Social History     Tobacco Use   • Smoking status: Never Smoker   • Smokeless tobacco: Never Used   Vaping Use   • Vaping Use: Never used   Substance Use Topics   • Alcohol use: No   • Drug use: No         Lab Results   Component Value Date    ABO O 2021    RH Positive 2021    ABSCRN Negative 2021       MDM  Impression: Pregnancy with active problem(s) &/or complication(s)   Acid reflux   Insomnia   Round ligament pain   Tests done today: none   Topics discussed: continue to note good FM  Acid reflux / options / pepcid escribed  unisom prn - escribed   Comfort measures in pregnancy  T-dap today   s/s PTL  Round ligament pain   encouraged questions - call prn   Written info optional/provided:  -T-DAP   Tests next visit:            
Benefits, risks, and possible complications of procedure explained to patient/caregiver who verbalized understanding and gave written consent.

## 2025-06-23 NOTE — ASU DISCHARGE PLAN (ADULT/PEDIATRIC) - FINANCIAL ASSISTANCE
[facility] provides services at a reduced cost to those who are determined to be eligible through [facility]’s financial assistance program. Information regarding [facility]’s financial assistance program can be found by going to [email or web address] or by calling [phone].

## 2025-06-23 NOTE — PROCEDURE NOTE - PROCEDURE FINDINGS AND DETAILS
Injection of contrast demonstrated persistent large amorphous collection without communication to surrounding structures. 40cc of contrast required to opacify/ visualize collection borders. Contrast aspirated from collection cavity and 400mg of doxycycline in 40cc sterile water then injected into collection cavity for sclerosis. Doxycyline left to dwell for 1hr with turns q15 min. All 40cc of doxycyline then aspirated and drain reattached to bulb.    Full report to follow

## 2025-06-23 NOTE — PROCEDURE NOTE - PLAN
-Pt to hold flushing x24hrs then resume  -Pt to f/u in 2 weeks for repeat drain evaluation with possible sclerosis

## 2025-06-23 NOTE — PRE PROCEDURE NOTE - PRE PROCEDURE EVALUATION
Interventional Radiology    HPI: 49 year old F with PMH Morbid obesity, HLD, HTN, s/p panniculectomy and ventral/umbilical hernia repair 10/28/24 c/b abdominal wall fluid collection s/p drainage at [facility] 12/20/24. Last appointment, pt underwent upsizing of drain from 12 Fr to 14 Fr. Patient most recently underwent washout and excision of abdominal wall seroma cavity with Dr Shikowitz-Behr on 03/20/2025. A drain was placed in OR and removed on 5/27. CT on 6/5 shows reaccumulation of anterior abd wall collection measuring 10.8 x 2.0 x 7.7cm. S/p drain placement on 6/9/2. Patient last seen in IR on 6/17 for complaints of abdominal pain and clogging of the tube. Drain was upsized to 12fr at that time, residual large collection noted. Patient now presents to IR for abdominal abscess drain evaluation and sclerosing.    Review of Systems:   Constitutional: No fever  Respiratory: No shortness of breath  Cardiovascular: No chest pain  Gastrointestinal: "stretch marks swollen", No abdominal or epigastric pain. No nausea, vomiting, or hematemesis    Allergies: [This allergen will not trigger allergy alert] COLD urticaria - has epi pen (Rash; Urticaria)  No Known Drug Allergies  adhesives (Rash)    Medications (Abx/Cardiac/Anticoagulation/Blood Products)      Data:  162.6  137.4  T(C): 36.7  HR: 88  BP: 140/84  RR: 20  SpO2: 98%    -WBC 13.15 / HgB 11.4 / Hct 36.9 / Plt 380  -Na 144 / Cl 108 / BUN 13 / Glucose 106  -K 4.0 / CO2 32 / Cr 1.00  -ALT 25 / Alk Phos 92 / T.Bili 0.1      Physical Exam  General: No acute distress, nontoxic, A&Ox3  Chest: Non labored breathing  Abdomen: Non-distended, non-tender, no peritoneal signs, +edematous striations in lower abdomen, +LLQ drain  Extremities: No swelling, warm    RADIOLOGY & ADDITIONAL TESTS:    Imaging Reviewed    H & P Note Reviewed from: 6/9/25    Plan: 49y Female presents for Abdominal wall collection drain evaluation with sclerosis  -Risks/Benefits/alternatives explained with the patient and/or healthcare proxy and witnessed informed consent obtained.    Interventional Radiology    HPI: 49 year old F with PMH Morbid obesity, HLD, HTN, s/p panniculectomy and ventral/umbilical hernia repair 10/28/24 c/b abdominal wall fluid collection s/p drainage at [facility] 12/20/24. Last appointment, pt underwent upsizing of drain from 12 Fr to 14 Fr. Patient most recently underwent washout and excision of abdominal wall seroma cavity with Dr Shikowitz-Behr on 03/20/2025. A drain was placed in OR and removed on 5/27. CT on 6/5 shows reaccumulation of anterior abd wall collection measuring 10.8 x 2.0 x 7.7cm. S/p drain placement on 6/9/2. Patient last seen in IR on 6/17 for complaints of abdominal pain and clogging of the tube. Drain was upsized to 12fr at that time, residual large collection noted.     Patient now presents to IR for abdominal abscess drain evaluation and sclerosing. Patient reports over 100cc of clear senthil fluid daily and is flushing with 10cc normal saline daily without issues. She states she was having low grade fevers last week, however has not had a fever for 3 days. Patient denies abdominal pain, nausea, vomiting, chills, nightsweats    Review of Systems:   Constitutional: No fever  Respiratory: No shortness of breath  Cardiovascular: No chest pain  Gastrointestinal: "stretch marks swollen", No abdominal or epigastric pain. No nausea, vomiting, or hematemesis    Allergies: [This allergen will not trigger allergy alert] COLD urticaria - has epi pen (Rash; Urticaria)  No Known Drug Allergies  adhesives (Rash)    Medications (Abx/Cardiac/Anticoagulation/Blood Products)      Data:  162.6  137.4  T(C): 36.7  HR: 88  BP: 140/84  RR: 20  SpO2: 98%    -WBC 13.15 / HgB 11.4 / Hct 36.9 / Plt 380  -Na 144 / Cl 108 / BUN 13 / Glucose 106  -K 4.0 / CO2 32 / Cr 1.00  -ALT 25 / Alk Phos 92 / T.Bili 0.1      Physical Exam  General: No acute distress, nontoxic, A&Ox3  Chest: Non labored breathing  Abdomen: Non-distended, non-tender, no peritoneal signs, +edematous striations in lower abdomen, +LLQ drain  Extremities: No swelling, warm    RADIOLOGY & ADDITIONAL TESTS:    Imaging Reviewed    H & P Note Reviewed from: 6/9/25    Plan: 49y Female presents for Abdominal wall collection drain evaluation with sclerosis  -Risks/Benefits/alternatives explained with the patient and/or healthcare proxy and witnessed informed consent obtained.

## 2025-06-24 ENCOUNTER — APPOINTMENT (OUTPATIENT)
Dept: PLASTIC SURGERY | Facility: CLINIC | Age: 50
End: 2025-06-24

## 2025-06-24 PROCEDURE — 99024 POSTOP FOLLOW-UP VISIT: CPT

## 2025-06-26 ENCOUNTER — NON-APPOINTMENT (OUTPATIENT)
Age: 50
End: 2025-06-26

## 2025-06-26 ENCOUNTER — APPOINTMENT (OUTPATIENT)
Dept: CARDIOLOGY | Facility: CLINIC | Age: 50
End: 2025-06-26
Payer: MEDICARE

## 2025-06-26 ENCOUNTER — APPOINTMENT (OUTPATIENT)
Dept: SURGERY | Facility: CLINIC | Age: 50
End: 2025-06-26

## 2025-06-26 VITALS
HEIGHT: 65 IN | SYSTOLIC BLOOD PRESSURE: 128 MMHG | DIASTOLIC BLOOD PRESSURE: 85 MMHG | BODY MASS INDEX: 48.82 KG/M2 | WEIGHT: 293 LBS | HEART RATE: 78 BPM | OXYGEN SATURATION: 98 %

## 2025-06-26 LAB
BACTERIA BLD CULT: NORMAL
CHOLEST SERPL-MCNC: 221 MG/DL
HCT VFR BLD CALC: 37.3 %
HDLC SERPL-MCNC: 46 MG/DL
HGB BLD-MCNC: 11.6 G/DL
LDLC SERPL-MCNC: 149 MG/DL
MCHC RBC-ENTMCNC: 25.9 PG
MCHC RBC-ENTMCNC: 31.1 G/DL
MCV RBC AUTO: 83.3 FL
NONHDLC SERPL-MCNC: 174 MG/DL
PLATELET # BLD AUTO: 422 K/UL
RBC # BLD: 4.48 M/UL
RBC # FLD: 14 %
TRIGL SERPL-MCNC: 138 MG/DL
WBC # FLD AUTO: 9.11 K/UL

## 2025-06-26 PROCEDURE — 99214 OFFICE O/P EST MOD 30 MIN: CPT

## 2025-06-26 PROCEDURE — G2211 COMPLEX E/M VISIT ADD ON: CPT

## 2025-06-26 PROCEDURE — 36415 COLL VENOUS BLD VENIPUNCTURE: CPT

## 2025-06-26 PROCEDURE — 93000 ELECTROCARDIOGRAM COMPLETE: CPT

## 2025-07-03 ENCOUNTER — APPOINTMENT (OUTPATIENT)
Dept: INTERNAL MEDICINE | Facility: CLINIC | Age: 50
End: 2025-07-03

## 2025-07-03 ENCOUNTER — OUTPATIENT (OUTPATIENT)
Dept: OUTPATIENT SERVICES | Facility: HOSPITAL | Age: 50
LOS: 1 days | End: 2025-07-03

## 2025-07-03 DIAGNOSIS — Z98.890 OTHER SPECIFIED POSTPROCEDURAL STATES: Chronic | ICD-10-CM

## 2025-07-07 ENCOUNTER — OUTPATIENT (OUTPATIENT)
Dept: OUTPATIENT SERVICES | Facility: HOSPITAL | Age: 50
LOS: 1 days | End: 2025-07-07
Payer: MEDICARE

## 2025-07-07 ENCOUNTER — TRANSCRIPTION ENCOUNTER (OUTPATIENT)
Age: 50
End: 2025-07-07

## 2025-07-07 ENCOUNTER — RESULT REVIEW (OUTPATIENT)
Age: 50
End: 2025-07-07

## 2025-07-07 VITALS
RESPIRATION RATE: 18 BRPM | HEIGHT: 65 IN | HEART RATE: 101 BPM | SYSTOLIC BLOOD PRESSURE: 137 MMHG | TEMPERATURE: 98 F | DIASTOLIC BLOOD PRESSURE: 66 MMHG | OXYGEN SATURATION: 97 % | WEIGHT: 293 LBS

## 2025-07-07 DIAGNOSIS — Z98.890 OTHER SPECIFIED POSTPROCEDURAL STATES: Chronic | ICD-10-CM

## 2025-07-07 DIAGNOSIS — L02.211 CUTANEOUS ABSCESS OF ABDOMINAL WALL: ICD-10-CM

## 2025-07-07 DIAGNOSIS — Z90.3 ACQUIRED ABSENCE OF STOMACH [PART OF]: Chronic | ICD-10-CM

## 2025-07-07 PROCEDURE — 49185 SCLEROTX FLUID COLLECTION: CPT

## 2025-07-07 NOTE — ASU DISCHARGE PLAN (ADULT/PEDIATRIC) - NURSING INSTRUCTIONS
Please feel free to contact us at [phone] if any problems arise. After 6PM, Monday through Friday, on weekends and on holidays, please call [phone] and ask for the radiology resident on call to be paged.

## 2025-07-07 NOTE — PROCEDURE NOTE - PROCEDURE FINDINGS AND DETAILS
Contrast injection demonstrated large residual amorphic cavity (decreased from previsous evaluation) with no communication with nearby structures. Drain sclerosed with 15cc doxycycline and left to dwell. Drain remains sutured in place, dry sterile dressing placed. Full report to follow.

## 2025-07-07 NOTE — PROCEDURE NOTE - PLAN
Patient dwelled with 15cc Doxycycline for 1 hour, patient instructed to turn in order to coat perimeter of collection. Subsequently removed. Instructed to not flush drain for first 24 hours then can resume flushes with 5cc daily to maintain drain patency.     Follow up in 2 weeks for evaluation, appt facilitated for 7/21 at 930am.

## 2025-07-07 NOTE — ASU DISCHARGE PLAN (ADULT/PEDIATRIC) - ASU DC SPECIAL INSTRUCTIONSFT
Drain Check and sclerosed    Discharge Instructions  - You have had a drain checked.  - Keep the area clean and dry.  - Do not soak in a tub or pool with the drain, however you may shower with the drain and dressing covered in plastic wrap.  - Do not put traction on the drain and be careful that the drain does not get accidentally dislodged or kinked.  - Record output daily from the drain. Empty the bag as needed.  - You may resume your normal diet.  - You may resume your normal medications.  - It is normal to experience some pain over the site for the next few days. You may take apply ice to the area (20 minutes on, 20 minutes off) and take Tylenol for that pain. Do not take more frequently than every 6 hours and do not exceed more than 3000mg of Tylenol in a 24 hour period.    Notify your primary physician and/or Interventional Radiology IMMEDIATELY if you experience any of the following       - Fever of 100.4F  or 38C       - Chills or Rigors/ Shakes       - Swelling and/or Redness in the area of the puncture site       - Worsening Pain       - Blood soaked bandages or worsening bleeding       - Lightheadedness and/or dizziness upon standing       - Chest Pain/ Tightness       - Shortness of Breath       - Difficulty walking    If you have a problem that you believe requires IMMEDIATE attention, please go to your NEAREST Emergency Room. If you believe your problem can safely wait until you speak to a physician, please call Interventional Radiology for any concerns.    Please feel free to contact us at [phone] if any problems arise. After 6PM, Monday through Friday, on weekends and on holidays, please call [phone] and ask for the radiology resident on call to be paged.

## 2025-07-07 NOTE — ASU DISCHARGE PLAN (ADULT/PEDIATRIC) - NS MD DC FALL RISK RISK
For information on Fall & Injury Prevention, visit: [email or web address] OR  [email or web address] OR  https://www.cdc.gov/steadi/patient.html

## 2025-07-07 NOTE — ASU DISCHARGE PLAN (ADULT/PEDIATRIC) - COMMENTS
Please follow up in 2 weeks for repeat drain evaluation with possible sclerosis. Please follow up in 2 weeks for repeat drain evaluation with possible sclerosis. Appointment facilitated for 7/21 at 930am, please arrive at 9am. If need to reschedule or any questions/concerns please call IR booking office at [phone].

## 2025-07-07 NOTE — ASU PATIENT PROFILE, ADULT - FALL HARM RISK - UNIVERSAL INTERVENTIONS
Bed in lowest position, wheels locked, appropriate side rails in place/Call bell, personal items and telephone in reach/Instruct patient to call for assistance before getting out of bed or chair/Non-slip footwear when patient is out of bed/[place] [facility] to call system/Physically safe environment - no spills, clutter or unnecessary equipment/Purposeful Proactive Rounding/Room/bathroom lighting operational, light cord in reach

## 2025-07-07 NOTE — PRE PROCEDURE NOTE - PRE PROCEDURE EVALUATION
Interventional Radiology    HPI: 49 year old F with PMH Morbid obesity, HLD, HTN, s/p panniculectomy and ventral/umbilical hernia repair 10/28/24 c/b abdominal wall fluid collection s/p drainage at [facility] 12/20/24. 3/20 s/p washout and excision of abdominal wall seroma cavity in [facility] a drain was placed in OR and removed on 5/27. CT on 6/5 shows reaccumulation of anterior abd wall collection measuring 10.8 x 2.0 x 7.7cm s/p drain placement on 6/9/2. 6/17 drain upsized to 12 Fr due to clogging. Most recent drain evaluation on 6/23 demonstrated a large residual collection and was sclerosed with 400mg doxycycline. Pt presents today for repeat drain evaluation.    Allergies: No Known Drug Allergies  adhesives (Rash)  [This allergen will not trigger allergy alert] COLD urticaria - has epi pen (Rash; Urticaria)    Medications (Abx/Cardiac/Anticoagulation/Blood Products)      Data:    T(C): --  HR: --  BP: --  RR: --  SpO2: --    Exam  General: No acute distress  Chest: Non labored breathing  Abdomen: Non-distended, + drain  Extremities: No swelling, warm      Imaging: reviewed    Plan: 49y Female presents for Abdominal drain evaluation and possible sclerotherapy.     -Risks/Benefits/alternatives explained with the patient and/or healthcare proxy and witnessed informed consent obtained.    Interventional Radiology    HPI: 49 year old F with PMH Morbid obesity, HLD, HTN, s/p panniculectomy and ventral/umbilical hernia repair 10/28/24 c/b abdominal wall fluid collection s/p drainage at [facility] 12/20/24. 3/20 s/p washout and excision of abdominal wall seroma cavity in which a drain was placed in OR and removed on 5/27. CT on 6/5 shows reaccumulation of anterior abd wall collection measuring 10.8 x 2.0 x 7.7cm s/p drain placement on 6/9/2. 6/17 drain upsized to 12 Fr due to clogging. Most recent drain evaluation on 6/23 demonstrated a large residual collection and was sclerosed with 400mg doxycycline. Pt presents today for repeat drain evaluation. States drain output yesterday was 105ml however previous days was around 60ml. [facility] and [facility], surgeon started her on antibiotics, denies fevers/chills. No redness/pinkness near drain site.     Allergies: No Known Drug Allergies  adhesives (Rash)  [This allergen will not trigger allergy alert] COLD urticaria - has epi pen (Rash; Urticaria)    Medications (Abx/Cardiac/Anticoagulation/Blood Products)      Data:    T(C): --  HR: --  BP: --  RR: --  SpO2: --    Exam  General: No acute distress  Chest: Non labored breathing  Abdomen: Non-distended, + drain  Extremities: No swelling, warm      Imaging: reviewed    Plan: 49y Female presents for Abdominal drain evaluation and possible sclerotherapy.     -Risks/Benefits/alternatives explained with the patient and/or healthcare proxy and witnessed informed consent obtained.    Interventional Radiology    HPI: 49 year old F with PMH Morbid obesity, HLD, HTN, s/p panniculectomy and ventral/umbilical hernia repair 10/28/24 c/b abdominal wall fluid collection s/p drainage at [facility] 12/20/24. 3/20 s/p washout and excision of abdominal wall seroma cavity in which a drain was placed in OR and removed on 5/27. CT on 6/5 shows reaccumulation of anterior abd wall collection measuring 10.8 x 2.0 x 7.7cm s/p drain placement on 6/9/2. 6/17 drain upsized to 12 Fr due to clogging. Most recent drain evaluation on 6/23 demonstrated a large residual collection and was sclerosed with 400mg doxycycline. Pt presents today for repeat drain evaluation. States drain output yesterday was 105ml however previous days was around 60ml. [facility] and [facility], surgeon started her on antibiotics, denies fevers/chills. No redness/pinkness near drain site.     Allergies: No Known Drug Allergies  adhesives (Rash)  [This allergen will not trigger allergy alert] COLD urticaria - has epi pen (Rash; Urticaria)    Medications (Abx/Cardiac/Anticoagulation/Blood Products)      Data:    T(C): --  HR: --  BP: --  RR: --  SpO2: --    Exam  General: No acute distress  Chest: Non labored breathing  Abdomen: Non-distended, + JOSELO drain with serous ouptut  Extremities: No swelling, warm      Imaging: reviewed    Plan: 49y Female presents for Abdominal drain evaluation and possible sclerotherapy.     -Risks/Benefits/alternatives explained with the patient and/or healthcare proxy and witnessed informed consent obtained.

## 2025-07-07 NOTE — ASU DISCHARGE PLAN (ADULT/PEDIATRIC) - FINANCIAL ASSISTANCE
[facility] provides services at a reduced cost to those who are determined to be eligible through [facility]’s financial assistance program. Information regarding [facility]’s financial assistance program can be found by going to [email or web address] or by calling [phone].

## 2025-07-08 ENCOUNTER — APPOINTMENT (OUTPATIENT)
Dept: PLASTIC SURGERY | Facility: CLINIC | Age: 50
End: 2025-07-08

## 2025-07-08 DIAGNOSIS — T81.89XA OTHER COMPLICATIONS OF PROCEDURES, NOT ELSEWHERE CLASSIFIED, INITIAL ENCOUNTER: ICD-10-CM

## 2025-07-08 DIAGNOSIS — Z98.890 OTHER SPECIFIED POSTPROCEDURAL STATES: ICD-10-CM

## 2025-07-08 PROCEDURE — 99212 OFFICE O/P EST SF 10 MIN: CPT

## 2025-07-09 ENCOUNTER — OUTPATIENT (OUTPATIENT)
Dept: OUTPATIENT SERVICES | Facility: HOSPITAL | Age: 50
LOS: 1 days | End: 2025-07-09
Payer: MEDICARE

## 2025-07-09 ENCOUNTER — APPOINTMENT (OUTPATIENT)
Dept: SURGERY | Facility: CLINIC | Age: 50
End: 2025-07-09
Payer: MEDICARE

## 2025-07-09 ENCOUNTER — APPOINTMENT (OUTPATIENT)
Dept: CT IMAGING | Facility: IMAGING CENTER | Age: 50
End: 2025-07-09
Payer: MEDICARE

## 2025-07-09 VITALS — HEIGHT: 65 IN | WEIGHT: 293 LBS | BODY MASS INDEX: 48.82 KG/M2

## 2025-07-09 DIAGNOSIS — S30.1XXD CONTUSION OF ABDOMINAL WALL, SUBSEQUENT ENCOUNTER: ICD-10-CM

## 2025-07-09 DIAGNOSIS — Z00.8 ENCOUNTER FOR OTHER GENERAL EXAMINATION: ICD-10-CM

## 2025-07-09 DIAGNOSIS — Z98.890 OTHER SPECIFIED POSTPROCEDURAL STATES: Chronic | ICD-10-CM

## 2025-07-09 DIAGNOSIS — Z90.3 ACQUIRED ABSENCE OF STOMACH [PART OF]: Chronic | ICD-10-CM

## 2025-07-09 PROCEDURE — 74176 CT ABD & PELVIS W/O CONTRAST: CPT

## 2025-07-09 PROCEDURE — 99214 OFFICE O/P EST MOD 30 MIN: CPT | Mod: 2W

## 2025-07-09 PROCEDURE — 74176 CT ABD & PELVIS W/O CONTRAST: CPT | Mod: 26

## 2025-07-09 PROCEDURE — 99204 OFFICE O/P NEW MOD 45 MIN: CPT | Mod: 2W

## 2025-07-10 RX ORDER — TIRZEPATIDE 2.5 MG/.5ML
2.5 INJECTION, SOLUTION SUBCUTANEOUS
Qty: 2 | Refills: 0 | Status: ACTIVE | COMMUNITY
Start: 2025-07-09

## 2025-07-11 ENCOUNTER — APPOINTMENT (OUTPATIENT)
Dept: PULMONOLOGY | Facility: CLINIC | Age: 50
End: 2025-07-11
Payer: MEDICARE

## 2025-07-11 VITALS
HEART RATE: 93 BPM | WEIGHT: 293 LBS | DIASTOLIC BLOOD PRESSURE: 96 MMHG | TEMPERATURE: 97.3 F | HEIGHT: 65 IN | OXYGEN SATURATION: 99 % | BODY MASS INDEX: 48.82 KG/M2 | SYSTOLIC BLOOD PRESSURE: 138 MMHG

## 2025-07-11 PROCEDURE — 99204 OFFICE O/P NEW MOD 45 MIN: CPT

## 2025-07-22 ENCOUNTER — NON-APPOINTMENT (OUTPATIENT)
Age: 50
End: 2025-07-22

## 2025-07-22 ENCOUNTER — APPOINTMENT (OUTPATIENT)
Dept: PLASTIC SURGERY | Facility: CLINIC | Age: 50
End: 2025-07-22
Payer: MEDICARE

## 2025-07-22 VITALS
WEIGHT: 293 LBS | HEART RATE: 85 BPM | RESPIRATION RATE: 16 BRPM | SYSTOLIC BLOOD PRESSURE: 134 MMHG | HEIGHT: 65 IN | OXYGEN SATURATION: 95 % | DIASTOLIC BLOOD PRESSURE: 88 MMHG | BODY MASS INDEX: 48.82 KG/M2

## 2025-07-22 DIAGNOSIS — Z98.890 OTHER SPECIFIED POSTPROCEDURAL STATES: ICD-10-CM

## 2025-07-22 PROCEDURE — 99213 OFFICE O/P EST LOW 20 MIN: CPT

## 2025-07-28 ENCOUNTER — APPOINTMENT (OUTPATIENT)
Dept: PULMONOLOGY | Facility: CLINIC | Age: 50
End: 2025-07-28
Payer: MEDICARE

## 2025-07-28 ENCOUNTER — APPOINTMENT (OUTPATIENT)
Dept: PULMONOLOGY | Facility: CLINIC | Age: 50
End: 2025-07-28

## 2025-07-28 VITALS
SYSTOLIC BLOOD PRESSURE: 117 MMHG | DIASTOLIC BLOOD PRESSURE: 80 MMHG | BODY MASS INDEX: 48.82 KG/M2 | TEMPERATURE: 97.5 F | HEART RATE: 89 BPM | WEIGHT: 293 LBS | OXYGEN SATURATION: 93 % | HEIGHT: 65 IN

## 2025-07-28 DIAGNOSIS — G47.30 SLEEP APNEA, UNSPECIFIED: ICD-10-CM

## 2025-07-28 PROCEDURE — 99214 OFFICE O/P EST MOD 30 MIN: CPT

## 2025-07-30 RX ORDER — CEFADROXIL 500 MG/1
500 CAPSULE ORAL TWICE DAILY
Qty: 14 | Refills: 0 | Status: ACTIVE | COMMUNITY
Start: 2025-07-30 | End: 1900-01-01

## 2025-07-30 RX ORDER — OXYCODONE 5 MG/1
5 TABLET ORAL
Qty: 12 | Refills: 0 | Status: ACTIVE | COMMUNITY
Start: 2025-07-30 | End: 1900-01-01

## 2025-07-30 RX ORDER — ONDANSETRON 4 MG/1
4 TABLET, ORALLY DISINTEGRATING ORAL
Qty: 9 | Refills: 0 | Status: ACTIVE | COMMUNITY
Start: 2025-07-30 | End: 1900-01-01

## 2025-07-31 ENCOUNTER — NON-APPOINTMENT (OUTPATIENT)
Age: 50
End: 2025-07-31

## 2025-07-31 ENCOUNTER — OUTPATIENT (OUTPATIENT)
Dept: OUTPATIENT SERVICES | Facility: HOSPITAL | Age: 50
LOS: 1 days | End: 2025-07-31
Payer: MEDICARE

## 2025-07-31 VITALS
SYSTOLIC BLOOD PRESSURE: 124 MMHG | DIASTOLIC BLOOD PRESSURE: 82 MMHG | RESPIRATION RATE: 16 BRPM | HEART RATE: 94 BPM | WEIGHT: 293 LBS | TEMPERATURE: 99 F | OXYGEN SATURATION: 96 % | HEIGHT: 65 IN

## 2025-07-31 DIAGNOSIS — S30.1XXD CONTUSION OF ABDOMINAL WALL, SUBSEQUENT ENCOUNTER: ICD-10-CM

## 2025-07-31 DIAGNOSIS — Z98.890 OTHER SPECIFIED POSTPROCEDURAL STATES: Chronic | ICD-10-CM

## 2025-07-31 DIAGNOSIS — G47.33 OBSTRUCTIVE SLEEP APNEA (ADULT) (PEDIATRIC): ICD-10-CM

## 2025-07-31 DIAGNOSIS — Z01.818 ENCOUNTER FOR OTHER PREPROCEDURAL EXAMINATION: ICD-10-CM

## 2025-07-31 DIAGNOSIS — Z90.3 ACQUIRED ABSENCE OF STOMACH [PART OF]: Chronic | ICD-10-CM

## 2025-07-31 DIAGNOSIS — Z98.890 OTHER SPECIFIED POSTPROCEDURAL STATES: ICD-10-CM

## 2025-07-31 DIAGNOSIS — E66.01 MORBID (SEVERE) OBESITY DUE TO EXCESS CALORIES: ICD-10-CM

## 2025-07-31 LAB
ALBUMIN SERPL ELPH-MCNC: 3.5 G/DL — SIGNIFICANT CHANGE UP (ref 3.3–5)
ALP SERPL-CCNC: 94 U/L — SIGNIFICANT CHANGE UP (ref 40–120)
ALT FLD-CCNC: 17 U/L — SIGNIFICANT CHANGE UP (ref 10–45)
ANION GAP SERPL CALC-SCNC: 7 MMOL/L — SIGNIFICANT CHANGE UP (ref 5–17)
AST SERPL-CCNC: 13 U/L — SIGNIFICANT CHANGE UP (ref 10–40)
BILIRUB SERPL-MCNC: 0.3 MG/DL — SIGNIFICANT CHANGE UP (ref 0.2–1.2)
BUN SERPL-MCNC: 7 MG/DL — SIGNIFICANT CHANGE UP (ref 7–23)
CALCIUM SERPL-MCNC: 9.2 MG/DL — SIGNIFICANT CHANGE UP (ref 8.4–10.5)
CHLORIDE SERPL-SCNC: 102 MMOL/L — SIGNIFICANT CHANGE UP (ref 96–108)
CO2 SERPL-SCNC: 29 MMOL/L — SIGNIFICANT CHANGE UP (ref 22–31)
CREAT SERPL-MCNC: 0.59 MG/DL — SIGNIFICANT CHANGE UP (ref 0.5–1.3)
EGFR: 111 ML/MIN/1.73M2 — SIGNIFICANT CHANGE UP
EGFR: 111 ML/MIN/1.73M2 — SIGNIFICANT CHANGE UP
GLUCOSE SERPL-MCNC: 88 MG/DL — SIGNIFICANT CHANGE UP (ref 70–99)
HCT VFR BLD CALC: 39.1 % — SIGNIFICANT CHANGE UP (ref 34.5–45)
HGB BLD-MCNC: 12.2 G/DL — SIGNIFICANT CHANGE UP (ref 11.5–15.5)
MCHC RBC-ENTMCNC: 25.3 PG — LOW (ref 27–34)
MCHC RBC-ENTMCNC: 31.2 G/DL — LOW (ref 32–36)
MCV RBC AUTO: 81.1 FL — SIGNIFICANT CHANGE UP (ref 80–100)
NRBC BLD AUTO-RTO: 0 /100 WBCS — SIGNIFICANT CHANGE UP (ref 0–0)
PLATELET # BLD AUTO: 317 K/UL — SIGNIFICANT CHANGE UP (ref 150–400)
POTASSIUM SERPL-MCNC: 4.1 MMOL/L — SIGNIFICANT CHANGE UP (ref 3.5–5.3)
POTASSIUM SERPL-SCNC: 4.1 MMOL/L — SIGNIFICANT CHANGE UP (ref 3.5–5.3)
PROT SERPL-MCNC: 7.7 G/DL — SIGNIFICANT CHANGE UP (ref 6–8.3)
RBC # BLD: 4.82 M/UL — SIGNIFICANT CHANGE UP (ref 3.8–5.2)
RBC # FLD: 14.1 % — SIGNIFICANT CHANGE UP (ref 10.3–14.5)
SODIUM SERPL-SCNC: 138 MMOL/L — SIGNIFICANT CHANGE UP (ref 135–145)
WBC # BLD: 15.04 K/UL — HIGH (ref 3.8–10.5)
WBC # FLD AUTO: 15.04 K/UL — HIGH (ref 3.8–10.5)

## 2025-07-31 PROCEDURE — 85027 COMPLETE CBC AUTOMATED: CPT

## 2025-07-31 PROCEDURE — 80053 COMPREHEN METABOLIC PANEL: CPT

## 2025-07-31 PROCEDURE — 36415 COLL VENOUS BLD VENIPUNCTURE: CPT

## 2025-07-31 PROCEDURE — G0463: CPT

## 2025-07-31 RX ORDER — CEFADROXIL 500 MG/1
1 CAPSULE ORAL
Refills: 0 | DISCHARGE

## 2025-08-01 ENCOUNTER — APPOINTMENT (OUTPATIENT)
Dept: PLASTIC SURGERY | Facility: CLINIC | Age: 50
End: 2025-08-01
Payer: MEDICARE

## 2025-08-01 ENCOUNTER — OUTPATIENT (OUTPATIENT)
Dept: OUTPATIENT SERVICES | Facility: HOSPITAL | Age: 50
LOS: 1 days | End: 2025-08-01
Payer: MEDICARE

## 2025-08-01 ENCOUNTER — TRANSCRIPTION ENCOUNTER (OUTPATIENT)
Age: 50
End: 2025-08-01

## 2025-08-01 VITALS
HEART RATE: 97 BPM | SYSTOLIC BLOOD PRESSURE: 124 MMHG | DIASTOLIC BLOOD PRESSURE: 68 MMHG | TEMPERATURE: 98 F | OXYGEN SATURATION: 96 % | RESPIRATION RATE: 18 BRPM

## 2025-08-01 DIAGNOSIS — L02.11 CUTANEOUS ABSCESS OF NECK: ICD-10-CM

## 2025-08-01 DIAGNOSIS — Z46.82 ENCOUNTER FOR FITTING AND ADJUSTMENT OF NON-VASCULAR CATHETER: ICD-10-CM

## 2025-08-01 DIAGNOSIS — Z98.890 OTHER SPECIFIED POSTPROCEDURAL STATES: Chronic | ICD-10-CM

## 2025-08-01 DIAGNOSIS — M79.81 NONTRAUMATIC HEMATOMA OF SOFT TISSUE: ICD-10-CM

## 2025-08-01 DIAGNOSIS — S30.1XXA CONTUSION OF ABDOMINAL WALL, INITIAL ENCOUNTER: ICD-10-CM

## 2025-08-01 DIAGNOSIS — Z90.3 ACQUIRED ABSENCE OF STOMACH [PART OF]: Chronic | ICD-10-CM

## 2025-08-01 PROCEDURE — 49424 ASSESS CYST CONTRAST INJECT: CPT

## 2025-08-01 PROCEDURE — 76080 X-RAY EXAM OF FISTULA: CPT | Mod: 26

## 2025-08-01 PROCEDURE — 87077 CULTURE AEROBIC IDENTIFY: CPT

## 2025-08-01 PROCEDURE — 99213 OFFICE O/P EST LOW 20 MIN: CPT

## 2025-08-01 PROCEDURE — 87070 CULTURE OTHR SPECIMN AEROBIC: CPT

## 2025-08-01 PROCEDURE — 87075 CULTR BACTERIA EXCEPT BLOOD: CPT

## 2025-08-01 PROCEDURE — 87186 SC STD MICRODIL/AGAR DIL: CPT

## 2025-08-01 PROCEDURE — 87205 SMEAR GRAM STAIN: CPT

## 2025-08-01 PROCEDURE — 87015 SPECIMEN INFECT AGNT CONCNTJ: CPT

## 2025-08-01 PROCEDURE — 76080 X-RAY EXAM OF FISTULA: CPT

## 2025-08-02 LAB
GRAM STN FLD: ABNORMAL
GRAM STN FLD: SIGNIFICANT CHANGE UP
SPECIMEN SOURCE: SIGNIFICANT CHANGE UP

## 2025-08-04 ENCOUNTER — NON-APPOINTMENT (OUTPATIENT)
Age: 50
End: 2025-08-04

## 2025-08-04 LAB
-  AMPICILLIN: SIGNIFICANT CHANGE UP
-  VANCOMYCIN: SIGNIFICANT CHANGE UP
HCT VFR BLD CALC: 37.9 %
HGB BLD-MCNC: 11.4 G/DL
MCHC RBC-ENTMCNC: 25.4 PG
MCHC RBC-ENTMCNC: 30.1 G/DL
MCV RBC AUTO: 84.6 FL
METHOD TYPE: SIGNIFICANT CHANGE UP
PLATELET # BLD AUTO: 293 K/UL
RBC # BLD: 4.48 M/UL
RBC # FLD: 14.6 %
WBC # FLD AUTO: 12.08 K/UL

## 2025-08-05 ENCOUNTER — NON-APPOINTMENT (OUTPATIENT)
Age: 50
End: 2025-08-05

## 2025-08-05 RX ORDER — AMOXICILLIN AND CLAVULANATE POTASSIUM 875; 125 MG/1; MG/1
875-125 TABLET, COATED ORAL TWICE DAILY
Qty: 20 | Refills: 0 | Status: ACTIVE | COMMUNITY
Start: 2025-08-05 | End: 1900-01-01

## 2025-08-07 ENCOUNTER — TRANSCRIPTION ENCOUNTER (OUTPATIENT)
Age: 50
End: 2025-08-07

## 2025-08-07 ENCOUNTER — APPOINTMENT (OUTPATIENT)
Dept: PLASTIC SURGERY | Facility: HOSPITAL | Age: 50
End: 2025-08-07

## 2025-08-07 ENCOUNTER — OUTPATIENT (OUTPATIENT)
Dept: OUTPATIENT SERVICES | Facility: HOSPITAL | Age: 50
LOS: 1 days | End: 2025-08-07
Payer: MEDICARE

## 2025-08-07 ENCOUNTER — RESULT REVIEW (OUTPATIENT)
Age: 50
End: 2025-08-07

## 2025-08-07 VITALS
RESPIRATION RATE: 16 BRPM | WEIGHT: 293 LBS | OXYGEN SATURATION: 98 % | SYSTOLIC BLOOD PRESSURE: 143 MMHG | DIASTOLIC BLOOD PRESSURE: 92 MMHG | HEART RATE: 79 BPM | TEMPERATURE: 98 F | HEIGHT: 65 IN

## 2025-08-07 DIAGNOSIS — Z90.3 ACQUIRED ABSENCE OF STOMACH [PART OF]: Chronic | ICD-10-CM

## 2025-08-07 DIAGNOSIS — Z98.890 OTHER SPECIFIED POSTPROCEDURAL STATES: Chronic | ICD-10-CM

## 2025-08-07 DIAGNOSIS — Z98.890 OTHER SPECIFIED POSTPROCEDURAL STATES: ICD-10-CM

## 2025-08-07 DIAGNOSIS — S30.1XXD CONTUSION OF ABDOMINAL WALL, SUBSEQUENT ENCOUNTER: ICD-10-CM

## 2025-08-07 PROCEDURE — 71045 X-RAY EXAM CHEST 1 VIEW: CPT

## 2025-08-07 PROCEDURE — 11042 DBRDMT SUBQ TIS 1ST 20SQCM/<: CPT

## 2025-08-07 PROCEDURE — 10140 I&D HMTMA SEROMA/FLUID COLLJ: CPT

## 2025-08-07 PROCEDURE — 94640 AIRWAY INHALATION TREATMENT: CPT

## 2025-08-07 PROCEDURE — 71045 X-RAY EXAM CHEST 1 VIEW: CPT | Mod: 26

## 2025-08-07 PROCEDURE — 11045 DBRDMT SUBQ TISS EACH ADDL: CPT

## 2025-08-07 PROCEDURE — 88305 TISSUE EXAM BY PATHOLOGIST: CPT | Mod: 26

## 2025-08-07 DEVICE — CLIP APPLIER ETHICON LIGACLIP 11.5" MEDIUM: Type: IMPLANTABLE DEVICE | Status: FUNCTIONAL

## 2025-08-07 RX ORDER — HYDROMORPHONE/SOD CHLOR,ISO/PF 2 MG/10 ML
4 SYRINGE (ML) INJECTION EVERY 4 HOURS
Refills: 0 | Status: DISCONTINUED | OUTPATIENT
Start: 2025-08-07 | End: 2025-08-07

## 2025-08-07 RX ORDER — ONDANSETRON HCL/PF 4 MG/2 ML
4 VIAL (ML) INJECTION ONCE
Refills: 0 | Status: DISCONTINUED | OUTPATIENT
Start: 2025-08-07 | End: 2025-08-07

## 2025-08-07 RX ORDER — HYDROMORPHONE/SOD CHLOR,ISO/PF 2 MG/10 ML
0.5 SYRINGE (ML) INJECTION
Refills: 0 | Status: DISCONTINUED | OUTPATIENT
Start: 2025-08-07 | End: 2025-08-07

## 2025-08-07 RX ORDER — SODIUM CHLORIDE 9 G/1000ML
1000 INJECTION, SOLUTION INTRAVENOUS
Refills: 0 | Status: DISCONTINUED | OUTPATIENT
Start: 2025-08-07 | End: 2025-08-07

## 2025-08-07 RX ORDER — ACETAMINOPHEN 500 MG/5ML
2 LIQUID (ML) ORAL
Refills: 0 | DISCHARGE

## 2025-08-07 RX ORDER — ACETAMINOPHEN 500 MG/5ML
650 LIQUID (ML) ORAL EVERY 6 HOURS
Refills: 0 | Status: ACTIVE | OUTPATIENT
Start: 2025-08-07 | End: 2026-07-06

## 2025-08-07 RX ORDER — PIPERACILLIN-TAZO-DEXTROSE,ISO 3.375G/5
3.38 IV SOLUTION, PIGGYBACK PREMIX FROZEN(ML) INTRAVENOUS EVERY 8 HOURS
Refills: 0 | Status: ACTIVE | OUTPATIENT
Start: 2025-08-07 | End: 2025-08-14

## 2025-08-07 RX ORDER — HYDROMORPHONE/SOD CHLOR,ISO/PF 2 MG/10 ML
2 SYRINGE (ML) INJECTION EVERY 4 HOURS
Refills: 0 | Status: DISCONTINUED | OUTPATIENT
Start: 2025-08-07 | End: 2025-08-07

## 2025-08-07 RX ORDER — IPRATROPIUM BROMIDE AND ALBUTEROL SULFATE .5; 2.5 MG/3ML; MG/3ML
3 SOLUTION RESPIRATORY (INHALATION) ONCE
Refills: 0 | Status: COMPLETED | OUTPATIENT
Start: 2025-08-07 | End: 2025-08-07

## 2025-08-07 RX ORDER — ONDANSETRON HCL/PF 4 MG/2 ML
4 VIAL (ML) INJECTION EVERY 6 HOURS
Refills: 0 | Status: ACTIVE | OUTPATIENT
Start: 2025-08-07 | End: 2026-07-06

## 2025-08-07 RX ORDER — SODIUM CHLORIDE 9 G/1000ML
1000 INJECTION, SOLUTION INTRAVENOUS
Refills: 0 | Status: ACTIVE | OUTPATIENT
Start: 2025-08-07 | End: 2025-08-08

## 2025-08-07 RX ADMIN — Medication 1 LOZENGE: at 23:23

## 2025-08-07 RX ADMIN — Medication 4 MILLILITER(S): at 22:51

## 2025-08-07 RX ADMIN — Medication 25 GRAM(S): at 18:01

## 2025-08-07 RX ADMIN — SODIUM CHLORIDE 50 MILLILITER(S): 9 INJECTION, SOLUTION INTRAVENOUS at 20:04

## 2025-08-07 RX ADMIN — SODIUM CHLORIDE 50 MILLILITER(S): 9 INJECTION, SOLUTION INTRAVENOUS at 16:54

## 2025-08-07 RX ADMIN — Medication 4 MILLIGRAM(S): at 16:51

## 2025-08-08 RX ORDER — AMOXICILLIN AND CLAVULANATE POTASSIUM 875; 125 MG/1; MG/1
875-125 TABLET, COATED ORAL
Qty: 14 | Refills: 0 | Status: ACTIVE | COMMUNITY
Start: 2025-08-08 | End: 1900-01-01

## 2025-08-08 RX ORDER — CEFADROXIL 500 MG/1
1 CAPSULE ORAL
Refills: 0 | DISCHARGE

## 2025-08-08 RX ADMIN — Medication 650 MILLIGRAM(S): at 18:12

## 2025-08-08 RX ADMIN — Medication 1 LOZENGE: at 18:12

## 2025-08-08 RX ADMIN — Medication 25 GRAM(S): at 21:55

## 2025-08-08 RX ADMIN — Medication 25 GRAM(S): at 05:03

## 2025-08-08 RX ADMIN — Medication 1 LOZENGE: at 08:06

## 2025-08-08 RX ADMIN — Medication 25 GRAM(S): at 13:15

## 2025-08-08 RX ADMIN — Medication 4 MILLILITER(S): at 08:40

## 2025-08-09 ENCOUNTER — TRANSCRIPTION ENCOUNTER (OUTPATIENT)
Age: 50
End: 2025-08-09

## 2025-08-09 VITALS
RESPIRATION RATE: 16 BRPM | HEART RATE: 96 BPM | SYSTOLIC BLOOD PRESSURE: 105 MMHG | TEMPERATURE: 99 F | DIASTOLIC BLOOD PRESSURE: 69 MMHG | OXYGEN SATURATION: 95 %

## 2025-08-09 PROCEDURE — 96379 UNL THER/PROP/DIAG INJ/INF: CPT

## 2025-08-09 PROCEDURE — 71045 X-RAY EXAM CHEST 1 VIEW: CPT

## 2025-08-09 PROCEDURE — 94640 AIRWAY INHALATION TREATMENT: CPT

## 2025-08-09 PROCEDURE — C1889: CPT

## 2025-08-09 PROCEDURE — 22903 EXC ABD LES SC 3 CM/>: CPT

## 2025-08-09 PROCEDURE — 88305 TISSUE EXAM BY PATHOLOGIST: CPT

## 2025-08-09 RX ADMIN — Medication 4 MILLILITER(S): at 12:14

## 2025-08-09 RX ADMIN — Medication 25 GRAM(S): at 05:19

## 2025-08-09 RX ADMIN — Medication 1 LOZENGE: at 11:49

## 2025-08-11 LAB — SURGICAL PATHOLOGY STUDY: SIGNIFICANT CHANGE UP

## 2025-08-12 ENCOUNTER — APPOINTMENT (OUTPATIENT)
Dept: PLASTIC SURGERY | Facility: CLINIC | Age: 50
End: 2025-08-12
Payer: MEDICARE

## 2025-08-12 DIAGNOSIS — M79.3 PANNICULITIS, UNSPECIFIED: ICD-10-CM

## 2025-08-12 DIAGNOSIS — S30.1XXD CONTUSION OF ABDOMINAL WALL, SUBSEQUENT ENCOUNTER: ICD-10-CM

## 2025-08-12 DIAGNOSIS — R63.4 ABNORMAL WEIGHT LOSS: ICD-10-CM

## 2025-08-12 PROCEDURE — 99024 POSTOP FOLLOW-UP VISIT: CPT

## 2025-08-19 ENCOUNTER — NON-APPOINTMENT (OUTPATIENT)
Age: 50
End: 2025-08-19

## 2025-08-19 ENCOUNTER — APPOINTMENT (OUTPATIENT)
Dept: PLASTIC SURGERY | Facility: CLINIC | Age: 50
End: 2025-08-19

## 2025-08-19 DIAGNOSIS — E65 LOCALIZED ADIPOSITY: ICD-10-CM

## 2025-08-19 DIAGNOSIS — Z98.890 OTHER SPECIFIED POSTPROCEDURAL STATES: ICD-10-CM

## 2025-08-19 DIAGNOSIS — S30.1XXA CONTUSION OF ABDOMINAL WALL, INITIAL ENCOUNTER: ICD-10-CM

## 2025-08-22 ENCOUNTER — EMERGENCY (EMERGENCY)
Facility: HOSPITAL | Age: 50
LOS: 1 days | End: 2025-08-22
Attending: EMERGENCY MEDICINE | Admitting: EMERGENCY MEDICINE
Payer: MEDICARE

## 2025-08-22 VITALS
RESPIRATION RATE: 18 BRPM | OXYGEN SATURATION: 99 % | DIASTOLIC BLOOD PRESSURE: 78 MMHG | SYSTOLIC BLOOD PRESSURE: 117 MMHG | TEMPERATURE: 100 F | HEART RATE: 87 BPM

## 2025-08-22 VITALS
HEIGHT: 65 IN | TEMPERATURE: 98 F | SYSTOLIC BLOOD PRESSURE: 124 MMHG | WEIGHT: 293 LBS | DIASTOLIC BLOOD PRESSURE: 85 MMHG | RESPIRATION RATE: 18 BRPM | OXYGEN SATURATION: 99 % | HEART RATE: 84 BPM

## 2025-08-22 DIAGNOSIS — Z98.890 OTHER SPECIFIED POSTPROCEDURAL STATES: Chronic | ICD-10-CM

## 2025-08-22 DIAGNOSIS — Z90.3 ACQUIRED ABSENCE OF STOMACH [PART OF]: Chronic | ICD-10-CM

## 2025-08-22 DIAGNOSIS — Z98.890 OTHER SPECIFIED POSTPROCEDURAL STATES: ICD-10-CM

## 2025-08-22 LAB
ALBUMIN SERPL ELPH-MCNC: 3.7 G/DL — SIGNIFICANT CHANGE UP (ref 3.3–5)
ALP SERPL-CCNC: 130 U/L — HIGH (ref 40–120)
ALT FLD-CCNC: 43 U/L — SIGNIFICANT CHANGE UP (ref 10–45)
ANION GAP SERPL CALC-SCNC: 10 MMOL/L — SIGNIFICANT CHANGE UP (ref 5–17)
AST SERPL-CCNC: 70 U/L — HIGH (ref 10–40)
BASOPHILS # BLD AUTO: 0.08 K/UL — SIGNIFICANT CHANGE UP (ref 0–0.2)
BASOPHILS NFR BLD AUTO: 0.5 % — SIGNIFICANT CHANGE UP (ref 0–2)
BILIRUB SERPL-MCNC: 0.5 MG/DL — SIGNIFICANT CHANGE UP (ref 0.2–1.2)
BUN SERPL-MCNC: 12 MG/DL — SIGNIFICANT CHANGE UP (ref 7–23)
CALCIUM SERPL-MCNC: 9.6 MG/DL — SIGNIFICANT CHANGE UP (ref 8.4–10.5)
CHLORIDE SERPL-SCNC: 104 MMOL/L — SIGNIFICANT CHANGE UP (ref 96–108)
CO2 SERPL-SCNC: 28 MMOL/L — SIGNIFICANT CHANGE UP (ref 22–31)
CREAT SERPL-MCNC: 0.67 MG/DL — SIGNIFICANT CHANGE UP (ref 0.5–1.3)
EGFR: 108 ML/MIN/1.73M2 — SIGNIFICANT CHANGE UP
EGFR: 108 ML/MIN/1.73M2 — SIGNIFICANT CHANGE UP
EOSINOPHIL # BLD AUTO: 0.1 K/UL — SIGNIFICANT CHANGE UP (ref 0–0.5)
EOSINOPHIL NFR BLD AUTO: 0.6 % — SIGNIFICANT CHANGE UP (ref 0–6)
GLUCOSE SERPL-MCNC: 120 MG/DL — HIGH (ref 70–99)
HCG SERPL-ACNC: 1 MIU/ML — SIGNIFICANT CHANGE UP
HCT VFR BLD CALC: 31.9 % — LOW (ref 34.5–45)
HGB BLD-MCNC: 9.9 G/DL — LOW (ref 11.5–15.5)
IMM GRANULOCYTES NFR BLD AUTO: 0.4 % — SIGNIFICANT CHANGE UP (ref 0–0.9)
LIDOCAIN IGE QN: 44 U/L — SIGNIFICANT CHANGE UP (ref 16–77)
LYMPHOCYTES # BLD AUTO: 1.59 K/UL — SIGNIFICANT CHANGE UP (ref 1–3.3)
LYMPHOCYTES # BLD AUTO: 9.9 % — LOW (ref 13–44)
MAGNESIUM SERPL-MCNC: 2.1 MG/DL — SIGNIFICANT CHANGE UP (ref 1.6–2.6)
MCHC RBC-ENTMCNC: 25.1 PG — LOW (ref 27–34)
MCHC RBC-ENTMCNC: 31 G/DL — LOW (ref 32–36)
MCV RBC AUTO: 81 FL — SIGNIFICANT CHANGE UP (ref 80–100)
MONOCYTES # BLD AUTO: 0.91 K/UL — HIGH (ref 0–0.9)
MONOCYTES NFR BLD AUTO: 5.7 % — SIGNIFICANT CHANGE UP (ref 2–14)
NEUTROPHILS # BLD AUTO: 13.33 K/UL — HIGH (ref 1.8–7.4)
NEUTROPHILS NFR BLD AUTO: 82.9 % — HIGH (ref 43–77)
NRBC BLD AUTO-RTO: 0 /100 WBCS — SIGNIFICANT CHANGE UP (ref 0–0)
NT-PROBNP SERPL-SCNC: 75 PG/ML — SIGNIFICANT CHANGE UP (ref 0–300)
PLATELET # BLD AUTO: 476 K/UL — HIGH (ref 150–400)
POTASSIUM SERPL-MCNC: 4.2 MMOL/L — SIGNIFICANT CHANGE UP (ref 3.5–5.3)
POTASSIUM SERPL-SCNC: 4.2 MMOL/L — SIGNIFICANT CHANGE UP (ref 3.5–5.3)
PROT SERPL-MCNC: 7.7 G/DL — SIGNIFICANT CHANGE UP (ref 6–8.3)
RBC # BLD: 3.94 M/UL — SIGNIFICANT CHANGE UP (ref 3.8–5.2)
RBC # FLD: 13.8 % — SIGNIFICANT CHANGE UP (ref 10.3–14.5)
SODIUM SERPL-SCNC: 142 MMOL/L — SIGNIFICANT CHANGE UP (ref 135–145)
TROPONIN I, HIGH SENSITIVITY RESULT: <4 NG/L — SIGNIFICANT CHANGE UP
WBC # BLD: 16.07 K/UL — HIGH (ref 3.8–10.5)
WBC # FLD AUTO: 16.07 K/UL — HIGH (ref 3.8–10.5)

## 2025-08-22 PROCEDURE — 71045 X-RAY EXAM CHEST 1 VIEW: CPT | Mod: 26

## 2025-08-22 PROCEDURE — 84702 CHORIONIC GONADOTROPIN TEST: CPT

## 2025-08-22 PROCEDURE — 83735 ASSAY OF MAGNESIUM: CPT

## 2025-08-22 PROCEDURE — 71045 X-RAY EXAM CHEST 1 VIEW: CPT

## 2025-08-22 PROCEDURE — 36415 COLL VENOUS BLD VENIPUNCTURE: CPT

## 2025-08-22 PROCEDURE — 99285 EMERGENCY DEPT VISIT HI MDM: CPT

## 2025-08-22 PROCEDURE — 83690 ASSAY OF LIPASE: CPT

## 2025-08-22 PROCEDURE — 84484 ASSAY OF TROPONIN QUANT: CPT

## 2025-08-22 PROCEDURE — 74177 CT ABD & PELVIS W/CONTRAST: CPT | Mod: 26

## 2025-08-22 PROCEDURE — 85025 COMPLETE CBC W/AUTO DIFF WBC: CPT

## 2025-08-22 PROCEDURE — 71275 CT ANGIOGRAPHY CHEST: CPT

## 2025-08-22 PROCEDURE — 83880 ASSAY OF NATRIURETIC PEPTIDE: CPT

## 2025-08-22 PROCEDURE — 71275 CT ANGIOGRAPHY CHEST: CPT | Mod: 26

## 2025-08-22 PROCEDURE — 80053 COMPREHEN METABOLIC PANEL: CPT

## 2025-08-22 PROCEDURE — 99285 EMERGENCY DEPT VISIT HI MDM: CPT | Mod: 25

## 2025-08-22 PROCEDURE — 93005 ELECTROCARDIOGRAM TRACING: CPT

## 2025-08-22 PROCEDURE — 93010 ELECTROCARDIOGRAM REPORT: CPT

## 2025-08-22 PROCEDURE — 74177 CT ABD & PELVIS W/CONTRAST: CPT

## 2025-08-23 ENCOUNTER — INPATIENT (INPATIENT)
Facility: HOSPITAL | Age: 50
LOS: 1 days | Discharge: HOME | End: 2025-08-25
Attending: SURGERY | Admitting: SURGERY
Payer: MEDICARE

## 2025-08-23 VITALS
OXYGEN SATURATION: 97 % | HEIGHT: 65 IN | SYSTOLIC BLOOD PRESSURE: 143 MMHG | DIASTOLIC BLOOD PRESSURE: 91 MMHG | HEART RATE: 79 BPM | TEMPERATURE: 98 F | RESPIRATION RATE: 19 BRPM | WEIGHT: 293 LBS

## 2025-08-23 DIAGNOSIS — Z98.890 OTHER SPECIFIED POSTPROCEDURAL STATES: Chronic | ICD-10-CM

## 2025-08-23 DIAGNOSIS — Z90.3 ACQUIRED ABSENCE OF STOMACH [PART OF]: Chronic | ICD-10-CM

## 2025-08-23 DIAGNOSIS — K85.90 ACUTE PANCREATITIS WITHOUT NECROSIS OR INFECTION, UNSPECIFIED: ICD-10-CM

## 2025-08-23 LAB
ADD ON TEST-SPECIMEN IN LAB: SIGNIFICANT CHANGE UP
ADD ON TEST-SPECIMEN IN LAB: SIGNIFICANT CHANGE UP
ALBUMIN SERPL ELPH-MCNC: 4.2 G/DL — SIGNIFICANT CHANGE UP (ref 3.3–5)
ALP SERPL-CCNC: 274 U/L — HIGH (ref 40–120)
ALT FLD-CCNC: 321 U/L — HIGH (ref 4–33)
ANION GAP SERPL CALC-SCNC: 14 MMOL/L — SIGNIFICANT CHANGE UP (ref 7–14)
AST SERPL-CCNC: 458 U/L — HIGH (ref 4–32)
BASOPHILS # BLD AUTO: 0.04 K/UL — SIGNIFICANT CHANGE UP (ref 0–0.2)
BASOPHILS NFR BLD AUTO: 0.4 % — SIGNIFICANT CHANGE UP (ref 0–2)
BILIRUB SERPL-MCNC: 2.5 MG/DL — HIGH (ref 0.2–1.2)
BUN SERPL-MCNC: 8 MG/DL — SIGNIFICANT CHANGE UP (ref 7–23)
CALCIUM SERPL-MCNC: 9.7 MG/DL — SIGNIFICANT CHANGE UP (ref 8.4–10.5)
CHLORIDE SERPL-SCNC: 101 MMOL/L — SIGNIFICANT CHANGE UP (ref 98–107)
CO2 SERPL-SCNC: 23 MMOL/L — SIGNIFICANT CHANGE UP (ref 22–31)
CREAT SERPL-MCNC: 0.65 MG/DL — SIGNIFICANT CHANGE UP (ref 0.5–1.3)
EGFR: 109 ML/MIN/1.73M2 — SIGNIFICANT CHANGE UP
EGFR: 109 ML/MIN/1.73M2 — SIGNIFICANT CHANGE UP
EOSINOPHIL # BLD AUTO: 0.02 K/UL — SIGNIFICANT CHANGE UP (ref 0–0.5)
EOSINOPHIL NFR BLD AUTO: 0.2 % — SIGNIFICANT CHANGE UP (ref 0–6)
GLUCOSE SERPL-MCNC: 132 MG/DL — HIGH (ref 70–99)
HCT VFR BLD CALC: 31.1 % — LOW (ref 34.5–45)
HGB BLD-MCNC: 9.6 G/DL — LOW (ref 11.5–15.5)
IMM GRANULOCYTES # BLD AUTO: 0.04 K/UL — SIGNIFICANT CHANGE UP (ref 0–0.07)
IMM GRANULOCYTES NFR BLD AUTO: 0.4 % — SIGNIFICANT CHANGE UP (ref 0–0.9)
LIDOCAIN IGE QN: >3000 U/L — SIGNIFICANT CHANGE UP (ref 7–60)
LYMPHOCYTES # BLD AUTO: 0.83 K/UL — LOW (ref 1–3.3)
LYMPHOCYTES NFR BLD AUTO: 8.6 % — LOW (ref 13–44)
MAGNESIUM SERPL-MCNC: 1.9 MG/DL — SIGNIFICANT CHANGE UP (ref 1.6–2.6)
MCHC RBC-ENTMCNC: 24.8 PG — LOW (ref 27–34)
MCHC RBC-ENTMCNC: 30.9 G/DL — LOW (ref 32–36)
MCV RBC AUTO: 80.4 FL — SIGNIFICANT CHANGE UP (ref 80–100)
MONOCYTES # BLD AUTO: 0.63 K/UL — SIGNIFICANT CHANGE UP (ref 0–0.9)
MONOCYTES NFR BLD AUTO: 6.5 % — SIGNIFICANT CHANGE UP (ref 2–14)
NEUTROPHILS # BLD AUTO: 8.11 K/UL — HIGH (ref 1.8–7.4)
NEUTROPHILS NFR BLD AUTO: 83.9 % — HIGH (ref 43–77)
NRBC # BLD AUTO: 0 K/UL — SIGNIFICANT CHANGE UP (ref 0–0)
NRBC # FLD: 0 K/UL — SIGNIFICANT CHANGE UP (ref 0–0)
NRBC BLD AUTO-RTO: 0 /100 WBCS — SIGNIFICANT CHANGE UP (ref 0–0)
PHOSPHATE SERPL-MCNC: 2.8 MG/DL — SIGNIFICANT CHANGE UP (ref 2.5–4.5)
PLATELET # BLD AUTO: 457 K/UL — HIGH (ref 150–400)
PMV BLD: 10.5 FL — SIGNIFICANT CHANGE UP (ref 7–13)
POTASSIUM SERPL-MCNC: 4 MMOL/L — SIGNIFICANT CHANGE UP (ref 3.5–5.3)
POTASSIUM SERPL-SCNC: 4 MMOL/L — SIGNIFICANT CHANGE UP (ref 3.5–5.3)
PROT SERPL-MCNC: 7.4 G/DL — SIGNIFICANT CHANGE UP (ref 6–8.3)
RBC # BLD: 3.87 M/UL — SIGNIFICANT CHANGE UP (ref 3.8–5.2)
RBC # FLD: 13.9 % — SIGNIFICANT CHANGE UP (ref 10.3–14.5)
SODIUM SERPL-SCNC: 138 MMOL/L — SIGNIFICANT CHANGE UP (ref 135–145)
WBC # BLD: 9.67 K/UL — SIGNIFICANT CHANGE UP (ref 3.8–10.5)
WBC # FLD AUTO: 9.67 K/UL — SIGNIFICANT CHANGE UP (ref 3.8–10.5)

## 2025-08-23 PROCEDURE — 74177 CT ABD & PELVIS W/CONTRAST: CPT | Mod: 26

## 2025-08-23 PROCEDURE — 99285 EMERGENCY DEPT VISIT HI MDM: CPT

## 2025-08-23 PROCEDURE — 76705 ECHO EXAM OF ABDOMEN: CPT | Mod: 26

## 2025-08-23 RX ORDER — ACETAMINOPHEN 500 MG/5ML
750 LIQUID (ML) ORAL EVERY 6 HOURS
Refills: 0 | Status: DISCONTINUED | OUTPATIENT
Start: 2025-08-23 | End: 2025-08-25

## 2025-08-23 RX ORDER — AMOXICILLIN AND CLAVULANATE POTASSIUM 500; 125 MG/1; MG/1
1 TABLET, FILM COATED ORAL
Refills: 0 | DISCHARGE

## 2025-08-23 RX ORDER — ACETAMINOPHEN 500 MG/5ML
1000 LIQUID (ML) ORAL ONCE
Refills: 0 | Status: COMPLETED | OUTPATIENT
Start: 2025-08-23 | End: 2025-08-23

## 2025-08-23 RX ORDER — IOHEXOL 350 MG/ML
30 INJECTION, SOLUTION INTRAVENOUS ONCE
Refills: 0 | Status: COMPLETED | OUTPATIENT
Start: 2025-08-23 | End: 2025-08-23

## 2025-08-23 RX ORDER — SODIUM CHLORIDE 9 G/1000ML
1000 INJECTION, SOLUTION INTRAVENOUS
Refills: 0 | Status: DISCONTINUED | OUTPATIENT
Start: 2025-08-23 | End: 2025-08-24

## 2025-08-23 RX ORDER — HEPARIN SODIUM 1000 [USP'U]/ML
5000 INJECTION INTRAVENOUS; SUBCUTANEOUS EVERY 8 HOURS
Refills: 0 | Status: DISCONTINUED | OUTPATIENT
Start: 2025-08-23 | End: 2025-08-25

## 2025-08-23 RX ORDER — SODIUM CHLORIDE 9 G/1000ML
1000 INJECTION, SOLUTION INTRAVENOUS
Refills: 0 | Status: DISCONTINUED | OUTPATIENT
Start: 2025-08-23 | End: 2025-08-23

## 2025-08-23 RX ADMIN — Medication 400 MILLIGRAM(S): at 17:16

## 2025-08-23 RX ADMIN — Medication 1000 MILLIGRAM(S): at 19:04

## 2025-08-23 RX ADMIN — IOHEXOL 30 MILLILITER(S): 350 INJECTION, SOLUTION INTRAVENOUS at 20:06

## 2025-08-23 RX ADMIN — SODIUM CHLORIDE 125 MILLILITER(S): 9 INJECTION, SOLUTION INTRAVENOUS at 18:49

## 2025-08-24 LAB
ALBUMIN SERPL ELPH-MCNC: 3.9 G/DL — SIGNIFICANT CHANGE UP (ref 3.3–5)
ALP SERPL-CCNC: 237 U/L — HIGH (ref 40–120)
ALT FLD-CCNC: 225 U/L — HIGH (ref 4–33)
ANION GAP SERPL CALC-SCNC: 12 MMOL/L — SIGNIFICANT CHANGE UP (ref 7–14)
APTT BLD: 30.7 SEC — SIGNIFICANT CHANGE UP (ref 26.1–36.8)
AST SERPL-CCNC: 192 U/L — HIGH (ref 4–32)
BILIRUB DIRECT SERPL-MCNC: 0.3 MG/DL — SIGNIFICANT CHANGE UP (ref 0–0.3)
BILIRUB INDIRECT FLD-MCNC: 0.3 MG/DL — SIGNIFICANT CHANGE UP (ref 0–1)
BILIRUB SERPL-MCNC: 0.6 MG/DL — SIGNIFICANT CHANGE UP (ref 0.2–1.2)
BUN SERPL-MCNC: 7 MG/DL — SIGNIFICANT CHANGE UP (ref 7–23)
CALCIUM SERPL-MCNC: 9.1 MG/DL — SIGNIFICANT CHANGE UP (ref 8.4–10.5)
CHLORIDE SERPL-SCNC: 105 MMOL/L — SIGNIFICANT CHANGE UP (ref 98–107)
CO2 SERPL-SCNC: 23 MMOL/L — SIGNIFICANT CHANGE UP (ref 22–31)
CREAT SERPL-MCNC: 0.65 MG/DL — SIGNIFICANT CHANGE UP (ref 0.5–1.3)
EGFR: 109 ML/MIN/1.73M2 — SIGNIFICANT CHANGE UP
EGFR: 109 ML/MIN/1.73M2 — SIGNIFICANT CHANGE UP
GLUCOSE SERPL-MCNC: 102 MG/DL — HIGH (ref 70–99)
HCT VFR BLD CALC: 28.4 % — LOW (ref 34.5–45)
HGB BLD-MCNC: 8.8 G/DL — LOW (ref 11.5–15.5)
INR BLD: 1.07 RATIO — SIGNIFICANT CHANGE UP (ref 0.85–1.16)
MAGNESIUM SERPL-MCNC: 2 MG/DL — SIGNIFICANT CHANGE UP (ref 1.6–2.6)
MCHC RBC-ENTMCNC: 24.8 PG — LOW (ref 27–34)
MCHC RBC-ENTMCNC: 31 G/DL — LOW (ref 32–36)
MCV RBC AUTO: 80 FL — SIGNIFICANT CHANGE UP (ref 80–100)
NRBC # BLD AUTO: 0 K/UL — SIGNIFICANT CHANGE UP (ref 0–0)
NRBC # FLD: 0 K/UL — SIGNIFICANT CHANGE UP (ref 0–0)
NRBC BLD AUTO-RTO: 0 /100 WBCS — SIGNIFICANT CHANGE UP (ref 0–0)
PHOSPHATE SERPL-MCNC: 3.1 MG/DL — SIGNIFICANT CHANGE UP (ref 2.5–4.5)
PLATELET # BLD AUTO: 363 K/UL — SIGNIFICANT CHANGE UP (ref 150–400)
PMV BLD: 10.1 FL — SIGNIFICANT CHANGE UP (ref 7–13)
POTASSIUM SERPL-MCNC: 3.8 MMOL/L — SIGNIFICANT CHANGE UP (ref 3.5–5.3)
POTASSIUM SERPL-SCNC: 3.8 MMOL/L — SIGNIFICANT CHANGE UP (ref 3.5–5.3)
PROT SERPL-MCNC: 6.9 G/DL — SIGNIFICANT CHANGE UP (ref 6–8.3)
PROTHROM AB SERPL-ACNC: 12.4 SEC — SIGNIFICANT CHANGE UP (ref 9.9–13.4)
RBC # BLD: 3.55 M/UL — LOW (ref 3.8–5.2)
RBC # FLD: 13.9 % — SIGNIFICANT CHANGE UP (ref 10.3–14.5)
SODIUM SERPL-SCNC: 140 MMOL/L — SIGNIFICANT CHANGE UP (ref 135–145)
WBC # BLD: 8.03 K/UL — SIGNIFICANT CHANGE UP (ref 3.8–10.5)
WBC # FLD AUTO: 8.03 K/UL — SIGNIFICANT CHANGE UP (ref 3.8–10.5)

## 2025-08-24 RX ORDER — SUCRALFATE 1 G
1 TABLET ORAL EVERY 6 HOURS
Refills: 0 | Status: DISCONTINUED | OUTPATIENT
Start: 2025-08-24 | End: 2025-08-25

## 2025-08-24 RX ORDER — POTASSIUM CHLORIDE, DEXTROSE MONOHYDRATE AND SODIUM CHLORIDE 150; 5; 900 MG/100ML; G/100ML; MG/100ML
1000 INJECTION, SOLUTION INTRAVENOUS
Refills: 0 | Status: DISCONTINUED | OUTPATIENT
Start: 2025-08-24 | End: 2025-08-24

## 2025-08-24 RX ORDER — SODIUM CHLORIDE 9 G/1000ML
1000 INJECTION, SOLUTION INTRAVENOUS
Refills: 0 | Status: DISCONTINUED | OUTPATIENT
Start: 2025-08-24 | End: 2025-08-25

## 2025-08-24 RX ADMIN — Medication 1 GRAM(S): at 17:32

## 2025-08-24 RX ADMIN — Medication 1 GRAM(S): at 23:56

## 2025-08-24 RX ADMIN — Medication 100 MILLIEQUIVALENT(S): at 13:07

## 2025-08-24 RX ADMIN — Medication 40 MILLIGRAM(S): at 05:20

## 2025-08-24 RX ADMIN — HEPARIN SODIUM 5000 UNIT(S): 1000 INJECTION INTRAVENOUS; SUBCUTANEOUS at 13:07

## 2025-08-24 RX ADMIN — HEPARIN SODIUM 5000 UNIT(S): 1000 INJECTION INTRAVENOUS; SUBCUTANEOUS at 05:20

## 2025-08-24 RX ADMIN — Medication 100 MILLIEQUIVALENT(S): at 11:34

## 2025-08-24 RX ADMIN — HEPARIN SODIUM 5000 UNIT(S): 1000 INJECTION INTRAVENOUS; SUBCUTANEOUS at 22:43

## 2025-08-24 RX ADMIN — SODIUM CHLORIDE 50 MILLILITER(S): 9 INJECTION, SOLUTION INTRAVENOUS at 16:01

## 2025-08-24 RX ADMIN — Medication 1 GRAM(S): at 11:33

## 2025-08-24 RX ADMIN — SODIUM CHLORIDE 100 MILLILITER(S): 9 INJECTION, SOLUTION INTRAVENOUS at 10:02

## 2025-08-24 RX ADMIN — SODIUM CHLORIDE 50 MILLILITER(S): 9 INJECTION, SOLUTION INTRAVENOUS at 22:43

## 2025-08-24 RX ADMIN — Medication 100 MILLIEQUIVALENT(S): at 10:30

## 2025-08-24 RX ADMIN — Medication 40 MILLIGRAM(S): at 17:31

## 2025-08-25 ENCOUNTER — TRANSCRIPTION ENCOUNTER (OUTPATIENT)
Age: 50
End: 2025-08-25

## 2025-08-25 VITALS
HEART RATE: 86 BPM | DIASTOLIC BLOOD PRESSURE: 68 MMHG | SYSTOLIC BLOOD PRESSURE: 135 MMHG | TEMPERATURE: 99 F | RESPIRATION RATE: 18 BRPM | OXYGEN SATURATION: 96 %

## 2025-08-25 LAB
ALBUMIN SERPL ELPH-MCNC: 3.5 G/DL — SIGNIFICANT CHANGE UP (ref 3.3–5)
ALP SERPL-CCNC: 200 U/L — HIGH (ref 40–120)
ALT FLD-CCNC: 144 U/L — HIGH (ref 4–33)
ANION GAP SERPL CALC-SCNC: 9 MMOL/L — SIGNIFICANT CHANGE UP (ref 7–14)
AST SERPL-CCNC: 69 U/L — HIGH (ref 4–32)
BILIRUB DIRECT SERPL-MCNC: <0.2 MG/DL — SIGNIFICANT CHANGE UP (ref 0–0.3)
BILIRUB INDIRECT FLD-MCNC: >0.1 MG/DL — SIGNIFICANT CHANGE UP (ref 0–1)
BILIRUB SERPL-MCNC: 0.3 MG/DL — SIGNIFICANT CHANGE UP (ref 0.2–1.2)
BUN SERPL-MCNC: 4 MG/DL — LOW (ref 7–23)
CALCIUM SERPL-MCNC: 8.8 MG/DL — SIGNIFICANT CHANGE UP (ref 8.4–10.5)
CHLORIDE SERPL-SCNC: 105 MMOL/L — SIGNIFICANT CHANGE UP (ref 98–107)
CO2 SERPL-SCNC: 25 MMOL/L — SIGNIFICANT CHANGE UP (ref 22–31)
CREAT SERPL-MCNC: 0.69 MG/DL — SIGNIFICANT CHANGE UP (ref 0.5–1.3)
EGFR: 107 ML/MIN/1.73M2 — SIGNIFICANT CHANGE UP
EGFR: 107 ML/MIN/1.73M2 — SIGNIFICANT CHANGE UP
GLUCOSE SERPL-MCNC: 108 MG/DL — HIGH (ref 70–99)
HCT VFR BLD CALC: 28.2 % — LOW (ref 34.5–45)
HGB BLD-MCNC: 8.5 G/DL — LOW (ref 11.5–15.5)
MAGNESIUM SERPL-MCNC: 2.1 MG/DL — SIGNIFICANT CHANGE UP (ref 1.6–2.6)
MCHC RBC-ENTMCNC: 24.3 PG — LOW (ref 27–34)
MCHC RBC-ENTMCNC: 30.1 G/DL — LOW (ref 32–36)
MCV RBC AUTO: 80.6 FL — SIGNIFICANT CHANGE UP (ref 80–100)
NRBC # BLD AUTO: 0 K/UL — SIGNIFICANT CHANGE UP (ref 0–0)
NRBC # FLD: 0 K/UL — SIGNIFICANT CHANGE UP (ref 0–0)
NRBC BLD AUTO-RTO: 0 /100 WBCS — SIGNIFICANT CHANGE UP (ref 0–0)
PHOSPHATE SERPL-MCNC: 2.9 MG/DL — SIGNIFICANT CHANGE UP (ref 2.5–4.5)
PLATELET # BLD AUTO: 338 K/UL — SIGNIFICANT CHANGE UP (ref 150–400)
PMV BLD: 10.4 FL — SIGNIFICANT CHANGE UP (ref 7–13)
POTASSIUM SERPL-MCNC: 3.8 MMOL/L — SIGNIFICANT CHANGE UP (ref 3.5–5.3)
POTASSIUM SERPL-SCNC: 3.8 MMOL/L — SIGNIFICANT CHANGE UP (ref 3.5–5.3)
PROT SERPL-MCNC: 6.6 G/DL — SIGNIFICANT CHANGE UP (ref 6–8.3)
RBC # BLD: 3.5 M/UL — LOW (ref 3.8–5.2)
RBC # FLD: 14.3 % — SIGNIFICANT CHANGE UP (ref 10.3–14.5)
SODIUM SERPL-SCNC: 139 MMOL/L — SIGNIFICANT CHANGE UP (ref 135–145)
WBC # BLD: 8.21 K/UL — SIGNIFICANT CHANGE UP (ref 3.8–10.5)
WBC # FLD AUTO: 8.21 K/UL — SIGNIFICANT CHANGE UP (ref 3.8–10.5)

## 2025-08-25 PROCEDURE — 74183 MRI ABD W/O CNTR FLWD CNTR: CPT | Mod: 26

## 2025-08-25 RX ADMIN — Medication 1 GRAM(S): at 05:11

## 2025-08-25 RX ADMIN — Medication 1 GRAM(S): at 11:32

## 2025-08-25 RX ADMIN — Medication 40 MILLIGRAM(S): at 05:12

## 2025-08-25 RX ADMIN — HEPARIN SODIUM 5000 UNIT(S): 1000 INJECTION INTRAVENOUS; SUBCUTANEOUS at 13:07

## 2025-08-25 RX ADMIN — HEPARIN SODIUM 5000 UNIT(S): 1000 INJECTION INTRAVENOUS; SUBCUTANEOUS at 05:12

## 2025-08-26 ENCOUNTER — APPOINTMENT (OUTPATIENT)
Dept: PLASTIC SURGERY | Facility: CLINIC | Age: 50
End: 2025-08-26

## 2025-08-28 ENCOUNTER — NON-APPOINTMENT (OUTPATIENT)
Age: 50
End: 2025-08-28

## 2025-08-28 LAB
ALBUMIN SERPL ELPH-MCNC: 4.4 G/DL
ALP BLD-CCNC: 187 U/L
ALT SERPL-CCNC: 99 U/L
AMYLASE/CREAT SERPL: 55 U/L
ANION GAP SERPL CALC-SCNC: 15 MMOL/L
AST SERPL-CCNC: 43 U/L
BASOPHILS # BLD AUTO: 0.09 K/UL
BASOPHILS NFR BLD AUTO: 0.9 %
BILIRUB SERPL-MCNC: 0.3 MG/DL
BUN SERPL-MCNC: 9 MG/DL
CALCIUM SERPL-MCNC: 9.8 MG/DL
CHLORIDE SERPL-SCNC: 101 MMOL/L
CO2 SERPL-SCNC: 23 MMOL/L
CREAT SERPL-MCNC: 0.74 MG/DL
EGFRCR SERPLBLD CKD-EPI 2021: 100 ML/MIN/1.73M2
EOSINOPHIL # BLD AUTO: 0.36 K/UL
EOSINOPHIL NFR BLD AUTO: 3.6 %
GLUCOSE SERPL-MCNC: 104 MG/DL
HCT VFR BLD CALC: 32.7 %
HGB BLD-MCNC: 9.6 G/DL
IMM GRANULOCYTES NFR BLD AUTO: 0.3 %
LPL SERPL-CCNC: 107 U/L
LYMPHOCYTES # BLD AUTO: 2.98 K/UL
LYMPHOCYTES NFR BLD AUTO: 29.8 %
MAN DIFF?: NORMAL
MCHC RBC-ENTMCNC: 24.4 PG
MCHC RBC-ENTMCNC: 29.4 G/DL
MCV RBC AUTO: 83 FL
MONOCYTES # BLD AUTO: 0.76 K/UL
MONOCYTES NFR BLD AUTO: 7.6 %
NEUTROPHILS # BLD AUTO: 5.79 K/UL
NEUTROPHILS NFR BLD AUTO: 57.8 %
PLATELET # BLD AUTO: 411 K/UL
POTASSIUM SERPL-SCNC: 4.4 MMOL/L
PROT SERPL-MCNC: 7.7 G/DL
RBC # BLD: 3.94 M/UL
RBC # FLD: 14.4 %
SODIUM SERPL-SCNC: 139 MMOL/L
WBC # FLD AUTO: 10.01 K/UL

## 2025-09-02 ENCOUNTER — APPOINTMENT (OUTPATIENT)
Dept: PLASTIC SURGERY | Facility: CLINIC | Age: 50
End: 2025-09-02
Payer: MEDICARE

## 2025-09-02 DIAGNOSIS — Z98.890 OTHER SPECIFIED POSTPROCEDURAL STATES: ICD-10-CM

## 2025-09-02 LAB
ALBUMIN SERPL ELPH-MCNC: 4.4 G/DL
ALP BLD-CCNC: 142 U/L
ALT SERPL-CCNC: 50 U/L
AMYLASE/CREAT SERPL: 55 U/L
ANION GAP SERPL CALC-SCNC: 15 MMOL/L
AST SERPL-CCNC: 28 U/L
BASOPHILS # BLD AUTO: 0.09 K/UL
BASOPHILS NFR BLD AUTO: 0.9 %
BILIRUB SERPL-MCNC: 0.2 MG/DL
BUN SERPL-MCNC: 13 MG/DL
CALCIUM SERPL-MCNC: 9.6 MG/DL
CHLORIDE SERPL-SCNC: 103 MMOL/L
CO2 SERPL-SCNC: 22 MMOL/L
CREAT SERPL-MCNC: 0.73 MG/DL
EGFRCR SERPLBLD CKD-EPI 2021: 101 ML/MIN/1.73M2
EOSINOPHIL # BLD AUTO: 0.31 K/UL
EOSINOPHIL NFR BLD AUTO: 3.1 %
GLUCOSE SERPL-MCNC: 111 MG/DL
HCT VFR BLD CALC: 33.2 %
HGB BLD-MCNC: 9.8 G/DL
IMM GRANULOCYTES NFR BLD AUTO: 0.4 %
LPL SERPL-CCNC: 96 U/L
LYMPHOCYTES # BLD AUTO: 2.55 K/UL
LYMPHOCYTES NFR BLD AUTO: 25.7 %
MAN DIFF?: NORMAL
MCHC RBC-ENTMCNC: 24 PG
MCHC RBC-ENTMCNC: 29.5 G/DL
MCV RBC AUTO: 81.4 FL
MONOCYTES # BLD AUTO: 0.86 K/UL
MONOCYTES NFR BLD AUTO: 8.7 %
NEUTROPHILS # BLD AUTO: 6.09 K/UL
NEUTROPHILS NFR BLD AUTO: 61.2 %
PLATELET # BLD AUTO: 366 K/UL
POTASSIUM SERPL-SCNC: 4.1 MMOL/L
PROT SERPL-MCNC: 7.3 G/DL
RBC # BLD: 4.08 M/UL
RBC # FLD: 14.5 %
SODIUM SERPL-SCNC: 140 MMOL/L
WBC # FLD AUTO: 9.94 K/UL

## 2025-09-02 PROCEDURE — 99024 POSTOP FOLLOW-UP VISIT: CPT

## 2025-09-03 VITALS — BODY MASS INDEX: 47.98 KG/M2 | HEIGHT: 65 IN | WEIGHT: 288 LBS

## 2025-09-04 ENCOUNTER — APPOINTMENT (OUTPATIENT)
Dept: CARDIOLOGY | Facility: CLINIC | Age: 50
End: 2025-09-04

## 2025-09-04 DIAGNOSIS — I10 ESSENTIAL (PRIMARY) HYPERTENSION: ICD-10-CM

## 2025-09-04 DIAGNOSIS — Z13.6 ENCOUNTER FOR SCREENING FOR CARDIOVASCULAR DISORDERS: ICD-10-CM

## 2025-09-04 DIAGNOSIS — E78.5 HYPERLIPIDEMIA, UNSPECIFIED: ICD-10-CM

## 2025-09-04 DIAGNOSIS — R06.02 SHORTNESS OF BREATH: ICD-10-CM

## 2025-09-04 DIAGNOSIS — R06.09 OTHER FORMS OF DYSPNEA: ICD-10-CM

## 2025-09-04 DIAGNOSIS — I89.0 LYMPHEDEMA, NOT ELSEWHERE CLASSIFIED: ICD-10-CM

## 2025-09-05 ENCOUNTER — APPOINTMENT (OUTPATIENT)
Dept: PLASTIC SURGERY | Facility: CLINIC | Age: 50
End: 2025-09-05

## 2025-09-05 PROCEDURE — 99212 OFFICE O/P EST SF 10 MIN: CPT

## 2025-09-09 ENCOUNTER — APPOINTMENT (OUTPATIENT)
Dept: PLASTIC SURGERY | Facility: CLINIC | Age: 50
End: 2025-09-09

## 2025-09-09 PROCEDURE — 99212 OFFICE O/P EST SF 10 MIN: CPT

## 2025-09-12 ENCOUNTER — APPOINTMENT (OUTPATIENT)
Dept: PLASTIC SURGERY | Facility: CLINIC | Age: 50
End: 2025-09-12

## 2025-09-16 ENCOUNTER — APPOINTMENT (OUTPATIENT)
Dept: PLASTIC SURGERY | Facility: CLINIC | Age: 50
End: 2025-09-16

## 2025-09-16 ENCOUNTER — RESULT REVIEW (OUTPATIENT)
Age: 50
End: 2025-09-16

## 2025-09-16 DIAGNOSIS — E65 LOCALIZED ADIPOSITY: ICD-10-CM

## 2025-09-16 DIAGNOSIS — M79.3 PANNICULITIS, UNSPECIFIED: ICD-10-CM

## 2025-09-16 DIAGNOSIS — R63.4 ABNORMAL WEIGHT LOSS: ICD-10-CM

## 2025-09-16 DIAGNOSIS — S30.1XXD CONTUSION OF ABDOMINAL WALL, SUBSEQUENT ENCOUNTER: ICD-10-CM

## 2025-09-19 ENCOUNTER — APPOINTMENT (OUTPATIENT)
Dept: ULTRASOUND IMAGING | Facility: CLINIC | Age: 50
End: 2025-09-19
Payer: MEDICARE

## 2025-09-19 ENCOUNTER — NON-APPOINTMENT (OUTPATIENT)
Age: 50
End: 2025-09-19

## 2025-09-19 LAB
ALBUMIN SERPL ELPH-MCNC: 4.1 G/DL
ALP BLD-CCNC: 95 U/L
ALT SERPL-CCNC: 22 U/L
AMYLASE/CREAT SERPL: 35 U/L
ANION GAP SERPL CALC-SCNC: 11 MMOL/L
AST SERPL-CCNC: 21 U/L
BILIRUB SERPL-MCNC: 0.2 MG/DL
BUN SERPL-MCNC: 10 MG/DL
CALCIUM SERPL-MCNC: 9.7 MG/DL
CHLORIDE SERPL-SCNC: 108 MMOL/L
CO2 SERPL-SCNC: 25 MMOL/L
CREAT SERPL-MCNC: 0.73 MG/DL
EGFRCR SERPLBLD CKD-EPI 2021: 101 ML/MIN/1.73M2
GLUCOSE SERPL-MCNC: 107 MG/DL
LPL SERPL-CCNC: 43 U/L
POTASSIUM SERPL-SCNC: 4.1 MMOL/L
PROT SERPL-MCNC: 7 G/DL
SODIUM SERPL-SCNC: 143 MMOL/L

## 2025-09-19 PROCEDURE — 76705 ECHO EXAM OF ABDOMEN: CPT | Mod: 26

## 2025-09-23 LAB
BASOPHILS # BLD AUTO: 0.08 K/UL
BASOPHILS NFR BLD AUTO: 0.9 %
EOSINOPHIL # BLD AUTO: 0.16 K/UL
EOSINOPHIL NFR BLD AUTO: 1.8 %
HCT VFR BLD CALC: 33.7 %
HGB BLD-MCNC: 10.1 G/DL
IMM GRANULOCYTES NFR BLD AUTO: 0.3 %
LYMPHOCYTES # BLD AUTO: 1.97 K/UL
LYMPHOCYTES NFR BLD AUTO: 22.7 %
MAN DIFF?: NORMAL
MCHC RBC-ENTMCNC: 24.6 PG
MCHC RBC-ENTMCNC: 30 G/DL
MCV RBC AUTO: 82.2 FL
MONOCYTES # BLD AUTO: 0.84 K/UL
MONOCYTES NFR BLD AUTO: 9.7 %
NEUTROPHILS # BLD AUTO: 5.58 K/UL
NEUTROPHILS NFR BLD AUTO: 64.6 %
PLATELET # BLD AUTO: 375 K/UL
RBC # BLD: 4.1 M/UL
RBC # FLD: 14.5 %
WBC # FLD AUTO: 8.66 K/UL

## 2025-09-25 PROBLEM — R79.89 ELEVATED LIVER FUNCTION TESTS: Status: ACTIVE | Noted: 2025-09-25

## (undated) DEVICE — DRSG ACE BANDAGE 6"

## (undated) DEVICE — ELCTR BOVIE TIP BLADE INSULATED 2.75" EDGE

## (undated) DEVICE — SUT MONOCRYL 3-0 27" PS-2 UNDYED

## (undated) DEVICE — VENODYNE/SCD SLEEVE CALF MEDIUM

## (undated) DEVICE — DRAPE TOWEL BLUE 17" X 24"

## (undated) DEVICE — PACK MINOR

## (undated) DEVICE — DRAPE 3/4 SHEET W REINFORCEMENT 56X77"

## (undated) DEVICE — MARKING PEN W RULER

## (undated) DEVICE — GLV 7.5 PROTEXIS (WHITE)

## (undated) DEVICE — MARKING PEN DEVON X-FINE TIP W RULER

## (undated) DEVICE — POSITIONER STRAP ARMBOARD VELCRO TS-30

## (undated) DEVICE — ELCTR STRYKER NEPTUNE SMOKE EVACUATION PENCIL (GREEN)

## (undated) DEVICE — POSITIONER FOAM OR TABLE PAD CONVOLUTED (PINK)

## (undated) DEVICE — STAPLER SKIN VISI-STAT 35 WIDE

## (undated) DEVICE — PREP BETADINE KIT

## (undated) DEVICE — SUT QUILL PDO 0 36CM 36MM

## (undated) DEVICE — GLV 8 PROTEXIS (WHITE)

## (undated) DEVICE — DRAPE SPLIT SHEET 77" X 120"

## (undated) DEVICE — DRSG MASTISOL

## (undated) DEVICE — DRSG COMBINE 5X9"

## (undated) DEVICE — DRSG KERLIX ROLL 4.5"

## (undated) DEVICE — SUT SURGIPRO 0 30" GS-22

## (undated) DEVICE — NDL HYPO SAFE 22G X 1.5" (BLACK)

## (undated) DEVICE — SOL IRR BAG NS 0.9% 3000ML

## (undated) DEVICE — DRSG KERLIX ROLL LG 4.5"

## (undated) DEVICE — WARMING BLANKET FULL ADULT

## (undated) DEVICE — SYR LUER LOK 20CC

## (undated) DEVICE — ONETRAC LIGHTED RETRACTOR 135 X 30MM DISP

## (undated) DEVICE — ELCTR GROUNDING PAD ADULT COVIDIEN

## (undated) DEVICE — DRSG XEROFORM 5 X 9"

## (undated) DEVICE — MARKING PEN WRITESITE PLUS

## (undated) DEVICE — GOWN LG

## (undated) DEVICE — DRSG BIOPATCH DISK W CHG 1" W 7.0MM HOLE

## (undated) DEVICE — NDL COUNTER FOAM AND MAGNET 40-70

## (undated) DEVICE — SUT POLYSORB 2-0 30" V-20 UNDYED

## (undated) DEVICE — DRSG COMBINE 5 X 9"

## (undated) DEVICE — SOL IRR POUR NS 0.9% 1000ML

## (undated) DEVICE — WARMING BLANKET LOWER ADULT

## (undated) DEVICE — LIGASURE IMPACT

## (undated) DEVICE — Device

## (undated) DEVICE — TUBING SINGLE SPIKE INFILTRTION 15.5 FT

## (undated) DEVICE — TUBING MICROAIRE ASPIRATION SET 12FT

## (undated) DEVICE — DRAIN RESERVOIR FOR JACKSON PRATT 100CC CARDINAL

## (undated) DEVICE — SUT PLAIN GUT FAST ABSORBING 5-0 PC-1

## (undated) DEVICE — ZIMMER PULSAVAC PLUS FAN KIT

## (undated) DEVICE — DRSG PREVENA PLUS SYSTEM

## (undated) DEVICE — GLV 6.5 PROTEXIS (WHITE)

## (undated) DEVICE — GLV 6.5 PROTEXIS (BLUE)

## (undated) DEVICE — SUT SURGIPRO 2-0 30" GS-22

## (undated) DEVICE — DRSG TELFA 2 X 3

## (undated) DEVICE — ELCTR BOVIE PENCIL SMOKE EVACUATION

## (undated) DEVICE — VENODYNE/SCD SLEEVE CALF LARGE

## (undated) DEVICE — SUT MONOSOFT 2-0 18" C-15

## (undated) DEVICE — ABDOMINAL BINDER MED/LG 9" X 36"-64"

## (undated) DEVICE — PACK MAJOR ABDOMINAL WITH LAP

## (undated) DEVICE — SUT MAXON 2-0 30" GS-21

## (undated) DEVICE — FOLEY TRAY 16FR LF URINE METER SURESTEP

## (undated) DEVICE — SUT POLYSORB 3-0 30" V-20 UNDYED

## (undated) DEVICE — SUT QUILL PDO 1 30CM T9 DA

## (undated) DEVICE — DRSG CURITY GAUZE SPONGE 4 X 4" 12-PLY

## (undated) DEVICE — DRSG STERISTRIPS 0.5 X 4"

## (undated) DEVICE — WARMING BLANKET UPPER ADULT